# Patient Record
Sex: MALE | Race: BLACK OR AFRICAN AMERICAN | Employment: FULL TIME | URBAN - METROPOLITAN AREA
[De-identification: names, ages, dates, MRNs, and addresses within clinical notes are randomized per-mention and may not be internally consistent; named-entity substitution may affect disease eponyms.]

---

## 2024-03-08 ENCOUNTER — HOSPITAL ENCOUNTER (EMERGENCY)
Age: 19
Discharge: HOME | End: 2024-03-08
Attending: EMERGENCY MEDICINE

## 2024-03-08 VITALS
OXYGEN SATURATION: 100 % | HEART RATE: 76 BPM | DIASTOLIC BLOOD PRESSURE: 73 MMHG | SYSTOLIC BLOOD PRESSURE: 131 MMHG | TEMPERATURE: 98.1 F | RESPIRATION RATE: 18 BRPM

## 2024-03-08 DIAGNOSIS — R19.7 NAUSEA VOMITING AND DIARRHEA: Primary | ICD-10-CM

## 2024-03-08 DIAGNOSIS — R11.2 NAUSEA VOMITING AND DIARRHEA: Primary | ICD-10-CM

## 2024-03-08 LAB
ALBUMIN SERPL-MCNC: 5.2 G/DL (ref 3.4–5)
ALBUMIN/GLOB SERPL: 1.5 {RATIO} (ref 1.1–2.2)
ALP SERPL-CCNC: 87 U/L (ref 40–129)
ALT SERPL-CCNC: 20 U/L (ref 10–40)
ANION GAP SERPL CALCULATED.3IONS-SCNC: 16 MMOL/L (ref 3–16)
AST SERPL-CCNC: 24 U/L (ref 15–37)
BASOPHILS # BLD: 0.1 K/UL (ref 0–0.2)
BASOPHILS NFR BLD: 0.5 %
BILIRUB SERPL-MCNC: 0.5 MG/DL (ref 0–1)
BUN SERPL-MCNC: 14 MG/DL (ref 7–20)
CALCIUM SERPL-MCNC: 10.6 MG/DL (ref 8.3–10.6)
CHLORIDE SERPL-SCNC: 103 MMOL/L (ref 99–110)
CO2 SERPL-SCNC: 21 MMOL/L (ref 21–32)
CREAT SERPL-MCNC: 1 MG/DL (ref 0.9–1.3)
DEPRECATED RDW RBC AUTO: 12.9 % (ref 12.4–15.4)
EOSINOPHIL # BLD: 0.2 K/UL (ref 0–0.6)
EOSINOPHIL NFR BLD: 1.1 %
GFR SERPLBLD CREATININE-BSD FMLA CKD-EPI: >60 ML/MIN/{1.73_M2}
GLUCOSE SERPL-MCNC: 171 MG/DL (ref 70–99)
HCT VFR BLD AUTO: 49.3 % (ref 40.5–52.5)
HGB BLD-MCNC: 17.2 G/DL (ref 13.5–17.5)
LIPASE SERPL-CCNC: 22 U/L (ref 13–60)
LYMPHOCYTES # BLD: 1.1 K/UL (ref 1–5.1)
LYMPHOCYTES NFR BLD: 7.3 %
MCH RBC QN AUTO: 29.2 PG (ref 26–34)
MCHC RBC AUTO-ENTMCNC: 34.8 G/DL (ref 31–36)
MCV RBC AUTO: 83.8 FL (ref 80–100)
MONOCYTES # BLD: 0.9 K/UL (ref 0–1.3)
MONOCYTES NFR BLD: 6.3 %
NEUTROPHILS # BLD: 12.5 K/UL (ref 1.7–7.7)
NEUTROPHILS NFR BLD: 84.8 %
PLATELET # BLD AUTO: 225 K/UL (ref 135–450)
PMV BLD AUTO: 9.6 FL (ref 5–10.5)
POTASSIUM SERPL-SCNC: 4.6 MMOL/L (ref 3.5–5.1)
PROT SERPL-MCNC: 8.6 G/DL (ref 6.4–8.2)
RBC # BLD AUTO: 5.87 M/UL (ref 4.2–5.9)
SODIUM SERPL-SCNC: 140 MMOL/L (ref 136–145)
WBC # BLD AUTO: 14.8 K/UL (ref 4–11)

## 2024-03-08 PROCEDURE — 96375 TX/PRO/DX INJ NEW DRUG ADDON: CPT

## 2024-03-08 PROCEDURE — 96374 THER/PROPH/DIAG INJ IV PUSH: CPT

## 2024-03-08 PROCEDURE — 2580000003 HC RX 258: Performed by: EMERGENCY MEDICINE

## 2024-03-08 PROCEDURE — 80053 COMPREHEN METABOLIC PANEL: CPT

## 2024-03-08 PROCEDURE — 85025 COMPLETE CBC W/AUTO DIFF WBC: CPT

## 2024-03-08 PROCEDURE — 83690 ASSAY OF LIPASE: CPT

## 2024-03-08 PROCEDURE — 6360000002 HC RX W HCPCS: Performed by: EMERGENCY MEDICINE

## 2024-03-08 PROCEDURE — 99284 EMERGENCY DEPT VISIT MOD MDM: CPT

## 2024-03-08 RX ORDER — KETOROLAC TROMETHAMINE 15 MG/ML
15 INJECTION, SOLUTION INTRAMUSCULAR; INTRAVENOUS ONCE
Status: COMPLETED | OUTPATIENT
Start: 2024-03-08 | End: 2024-03-08

## 2024-03-08 RX ORDER — ONDANSETRON 4 MG/1
4 TABLET, FILM COATED ORAL 3 TIMES DAILY PRN
Qty: 15 TABLET | Refills: 0 | Status: SHIPPED | OUTPATIENT
Start: 2024-03-08

## 2024-03-08 RX ORDER — ONDANSETRON 4 MG/1
4 TABLET, FILM COATED ORAL 3 TIMES DAILY PRN
Qty: 15 TABLET | Refills: 0 | Status: SHIPPED | OUTPATIENT
Start: 2024-03-08 | End: 2024-03-08

## 2024-03-08 RX ORDER — 0.9 % SODIUM CHLORIDE 0.9 %
1000 INTRAVENOUS SOLUTION INTRAVENOUS ONCE
Status: COMPLETED | OUTPATIENT
Start: 2024-03-08 | End: 2024-03-08

## 2024-03-08 RX ORDER — DROPERIDOL 2.5 MG/ML
1.25 INJECTION, SOLUTION INTRAMUSCULAR; INTRAVENOUS EVERY 6 HOURS PRN
Status: DISCONTINUED | OUTPATIENT
Start: 2024-03-08 | End: 2024-03-08 | Stop reason: HOSPADM

## 2024-03-08 RX ORDER — ONDANSETRON 2 MG/ML
4 INJECTION INTRAMUSCULAR; INTRAVENOUS ONCE
Status: COMPLETED | OUTPATIENT
Start: 2024-03-08 | End: 2024-03-08

## 2024-03-08 RX ADMIN — ONDANSETRON 4 MG: 2 INJECTION INTRAMUSCULAR; INTRAVENOUS at 04:34

## 2024-03-08 RX ADMIN — SODIUM CHLORIDE 1000 ML: 9 INJECTION, SOLUTION INTRAVENOUS at 05:04

## 2024-03-08 RX ADMIN — DROPERIDOL 1.25 MG: 2.5 INJECTION, SOLUTION INTRAMUSCULAR; INTRAVENOUS at 06:17

## 2024-03-08 RX ADMIN — KETOROLAC TROMETHAMINE 15 MG: 15 INJECTION, SOLUTION INTRAMUSCULAR; INTRAVENOUS at 04:34

## 2024-03-08 ASSESSMENT — LIFESTYLE VARIABLES
HOW OFTEN DO YOU HAVE A DRINK CONTAINING ALCOHOL: NEVER
HOW MANY STANDARD DRINKS CONTAINING ALCOHOL DO YOU HAVE ON A TYPICAL DAY: PATIENT DOES NOT DRINK

## 2024-03-08 ASSESSMENT — PAIN - FUNCTIONAL ASSESSMENT: PAIN_FUNCTIONAL_ASSESSMENT: NONE - DENIES PAIN

## 2024-03-08 NOTE — ED PROVIDER NOTES
[facility] EMERGENCY DEPARTMENT  EMERGENCY DEPARTMENT ENCOUNTER      Pt Name: Raffi Lux  MRN: [id number]  Birthdate 2005  Date of evaluation: 3/8/2024  Provider: Josseline Thompson MD    CHIEF COMPLAINT       Chief Complaint   Patient presents with    Emesis     Pt from home via [facility] c/o emesis since 020, pt states he had some chinese food around 2000 last night, denies drug or alcohol use.         HISTORY OF PRESENT ILLNESS   (Location/Symptom, Timing/Onset, Context/Setting, Quality, Duration, Modifying Factors, Severity)  Note limiting factors.   Raffi Lux is a 18 y.o. male who presents to the emergency department with nausea and vomiting since about midnight.  No alcohol or drug use.  He does not use cannabis.  No known sick contacts.  No fevers no diarrhea but he does feel like he needs to have a bowel movement in the emergency department.  No black or bloody emesis.  No black or bloody stools.  No prior episodes.  Mild diffuse abdominal discomfort like cramping.  No exacerbating or relieving factors.        Nursing Notes were reviewed.    REVIEW OF SYSTEMS    (2-9 systems for level 4, 10 or more for level 5)     As per HPI    Except as noted above the remainder of the review of systems was reviewed and negative.       PAST MEDICAL HISTORY   History reviewed. No pertinent past medical history.      SURGICAL HISTORY     History reviewed. No pertinent surgical history.      CURRENT MEDICATIONS       Previous Medications    No medications on file       ALLERGIES     Patient has no known allergies.    FAMILY HISTORY     History reviewed. No pertinent family history.       SOCIAL HISTORY       Social History     Socioeconomic History    Marital status: Single     Spouse name: None    Number of children: None    Years of education: None    Highest education level: None   Tobacco Use    Smoking status: Never    Smokeless tobacco: Never   Vaping Use    Vaping Use: Never used   Substance and  Sexual Activity    Alcohol use: Never    Drug use: Never       SCREENINGS         [place] Coma Scale  Eye Opening: Spontaneous  Best Verbal Response: Oriented  Best Motor Response: Obeys commands  Selin Coma Scale Score: 15                                       PHYSICAL EXAM    (up to 7 for level 4, 8 or more for level 5)     ED Triage Vitals   BP Temp Temp src Pulse Resp SpO2 Height Weight   -- -- -- -- -- -- -- --       GEN: Well nourished, well developed, no acute distress.  Actively vomiting into emesis  HEAD: Normocephalic, atraumatic.  No step-offs or deformities  EYES: Pupils equally round and reactive to light.  Extraocular movements intact.  Anicteric sclera  ENT: No nasal discharge.  No visible trauma.  Pink moist mucous membranes..  No lip, throat, or tongue swelling  NECK: Supple.  Painless range of motion..  Trachea midline.  CHEST: No visible deformity  HEART: Regular rate and rhythm.  No murmurs, gallops, rubs  LUNGS: Clear to auscultation bilaterally.  No wheezes, rhonchi, or rubs  ABDOMEN: Soft, nontender, nondistended.  Negative Berumen sign.  No tenderness over McBurney's point.  No rebound or guarding.  No definite masses noted.  EXTREMITIES: No clubbing, cyanosis, or edema.  No obvious deformities noted.  Calves appear equal and are nontender  SKIN: Warm, Dry, well-perfused.  No rash noted  NEURO: Alert and oriented x3.  No obvious focal neurologic deficits  PSYCH: Appropriate, cooperative      DIAGNOSTIC RESULTS           ED BEDSIDE ULTRASOUND:   Performed by ED Physician - none    LABS:  Labs Reviewed   CBC WITH AUTO DIFFERENTIAL - Abnormal; Notable for the following components:       Result Value    WBC 14.8 (*)     Neutrophils Absolute 12.5 (*)     All other components within normal limits   COMPREHENSIVE METABOLIC PANEL W/ REFLEX TO MG FOR LOW K - Abnormal; Notable for the following components:    Glucose 171 (*)     Total Protein 8.6 (*)     Albumin 5.2 (*)     All other components within

## 2024-07-16 ENCOUNTER — HOSPITAL ENCOUNTER (EMERGENCY)
Facility: HOSPITAL | Age: 19
Discharge: HOME | End: 2024-07-16
Attending: STUDENT IN AN ORGANIZED HEALTH CARE EDUCATION/TRAINING PROGRAM
Payer: OTHER GOVERNMENT

## 2024-07-16 ENCOUNTER — APPOINTMENT (OUTPATIENT)
Dept: RADIOLOGY | Facility: HOSPITAL | Age: 19
End: 2024-07-16
Payer: OTHER GOVERNMENT

## 2024-07-16 ENCOUNTER — APPOINTMENT (OUTPATIENT)
Dept: CARDIOLOGY | Facility: HOSPITAL | Age: 19
End: 2024-07-16
Payer: OTHER GOVERNMENT

## 2024-07-16 VITALS
OXYGEN SATURATION: 99 % | WEIGHT: 140 LBS | RESPIRATION RATE: 16 BRPM | HEART RATE: 53 BPM | DIASTOLIC BLOOD PRESSURE: 65 MMHG | BODY MASS INDEX: 21.22 KG/M2 | SYSTOLIC BLOOD PRESSURE: 108 MMHG | TEMPERATURE: 98.6 F | HEIGHT: 68 IN

## 2024-07-16 DIAGNOSIS — R07.9 CHEST PAIN, UNSPECIFIED TYPE: Primary | ICD-10-CM

## 2024-07-16 LAB
ALBUMIN SERPL BCP-MCNC: 4.3 G/DL (ref 3.4–5)
ALP SERPL-CCNC: 61 U/L (ref 33–120)
ALT SERPL W P-5'-P-CCNC: 13 U/L (ref 10–52)
ANION GAP SERPL CALC-SCNC: 10 MMOL/L (ref 10–20)
APTT PPP: 33 SECONDS (ref 27–38)
AST SERPL W P-5'-P-CCNC: 17 U/L (ref 9–39)
BASOPHILS # BLD AUTO: 0.08 X10*3/UL (ref 0–0.1)
BASOPHILS NFR BLD AUTO: 1.3 %
BILIRUB SERPL-MCNC: 0.5 MG/DL (ref 0–1.2)
BUN SERPL-MCNC: 15 MG/DL (ref 6–23)
CALCIUM SERPL-MCNC: 9.1 MG/DL (ref 8.6–10.3)
CARDIAC TROPONIN I PNL SERPL HS: 3 NG/L (ref 0–20)
CHLORIDE SERPL-SCNC: 106 MMOL/L (ref 98–107)
CO2 SERPL-SCNC: 28 MMOL/L (ref 21–32)
CREAT SERPL-MCNC: 1.31 MG/DL (ref 0.5–1.3)
D DIMER PPP FEU-MCNC: <215 NG/ML FEU
EGFRCR SERPLBLD CKD-EPI 2021: 80 ML/MIN/1.73M*2
EOSINOPHIL # BLD AUTO: 0.44 X10*3/UL (ref 0–0.7)
EOSINOPHIL NFR BLD AUTO: 7.3 %
ERYTHROCYTE [DISTWIDTH] IN BLOOD BY AUTOMATED COUNT: 12.1 % (ref 11.5–14.5)
GLUCOSE SERPL-MCNC: 85 MG/DL (ref 74–99)
HCT VFR BLD AUTO: 40.7 % (ref 41–52)
HGB BLD-MCNC: 14.5 G/DL (ref 13.5–17.5)
IMM GRANULOCYTES # BLD AUTO: 0.02 X10*3/UL (ref 0–0.7)
IMM GRANULOCYTES NFR BLD AUTO: 0.3 % (ref 0–0.9)
INR PPP: 1.1 (ref 0.9–1.1)
LYMPHOCYTES # BLD AUTO: 2.19 X10*3/UL (ref 1.2–4.8)
LYMPHOCYTES NFR BLD AUTO: 36.5 %
MCH RBC QN AUTO: 29.3 PG (ref 26–34)
MCHC RBC AUTO-ENTMCNC: 35.6 G/DL (ref 32–36)
MCV RBC AUTO: 82 FL (ref 80–100)
MONOCYTES # BLD AUTO: 0.64 X10*3/UL (ref 0.1–1)
MONOCYTES NFR BLD AUTO: 10.7 %
NEUTROPHILS # BLD AUTO: 2.63 X10*3/UL (ref 1.2–7.7)
NEUTROPHILS NFR BLD AUTO: 43.9 %
NRBC BLD-RTO: 0 /100 WBCS (ref 0–0)
PLATELET # BLD AUTO: 183 X10*3/UL (ref 150–450)
POTASSIUM SERPL-SCNC: 3.9 MMOL/L (ref 3.5–5.3)
PROT SERPL-MCNC: 6.8 G/DL (ref 6.4–8.2)
PROTHROMBIN TIME: 12 SECONDS (ref 9.8–12.8)
RBC # BLD AUTO: 4.95 X10*6/UL (ref 4.5–5.9)
SODIUM SERPL-SCNC: 140 MMOL/L (ref 136–145)
WBC # BLD AUTO: 6 X10*3/UL (ref 4.4–11.3)

## 2024-07-16 PROCEDURE — 84484 ASSAY OF TROPONIN QUANT: CPT | Performed by: PHYSICIAN ASSISTANT

## 2024-07-16 PROCEDURE — 84075 ASSAY ALKALINE PHOSPHATASE: CPT | Performed by: PHYSICIAN ASSISTANT

## 2024-07-16 PROCEDURE — 85610 PROTHROMBIN TIME: CPT | Performed by: PHYSICIAN ASSISTANT

## 2024-07-16 PROCEDURE — 85730 THROMBOPLASTIN TIME PARTIAL: CPT | Performed by: PHYSICIAN ASSISTANT

## 2024-07-16 PROCEDURE — 71046 X-RAY EXAM CHEST 2 VIEWS: CPT | Mod: FOREIGN READ | Performed by: RADIOLOGY

## 2024-07-16 PROCEDURE — 71046 X-RAY EXAM CHEST 2 VIEWS: CPT

## 2024-07-16 PROCEDURE — 36415 COLL VENOUS BLD VENIPUNCTURE: CPT | Performed by: PHYSICIAN ASSISTANT

## 2024-07-16 PROCEDURE — 85379 FIBRIN DEGRADATION QUANT: CPT | Performed by: PHYSICIAN ASSISTANT

## 2024-07-16 PROCEDURE — 85025 COMPLETE CBC W/AUTO DIFF WBC: CPT | Performed by: PHYSICIAN ASSISTANT

## 2024-07-16 PROCEDURE — 93005 ELECTROCARDIOGRAM TRACING: CPT

## 2024-07-16 PROCEDURE — 99284 EMERGENCY DEPT VISIT MOD MDM: CPT | Mod: 25

## 2024-07-16 ASSESSMENT — LIFESTYLE VARIABLES
EVER FELT BAD OR GUILTY ABOUT YOUR DRINKING: NO
HAVE YOU EVER FELT YOU SHOULD CUT DOWN ON YOUR DRINKING: NO
EVER HAD A DRINK FIRST THING IN THE MORNING TO STEADY YOUR NERVES TO GET RID OF A HANGOVER: NO
HAVE PEOPLE ANNOYED YOU BY CRITICIZING YOUR DRINKING: NO
TOTAL SCORE: 0

## 2024-07-16 ASSESSMENT — PAIN SCALES - GENERAL: PAINLEVEL_OUTOF10: 8

## 2024-07-16 ASSESSMENT — COLUMBIA-SUICIDE SEVERITY RATING SCALE - C-SSRS
2. HAVE YOU ACTUALLY HAD ANY THOUGHTS OF KILLING YOURSELF?: NO
6. HAVE YOU EVER DONE ANYTHING, STARTED TO DO ANYTHING, OR PREPARED TO DO ANYTHING TO END YOUR LIFE?: NO
1. IN THE PAST MONTH, HAVE YOU WISHED YOU WERE DEAD OR WISHED YOU COULD GO TO SLEEP AND NOT WAKE UP?: NO

## 2024-07-16 ASSESSMENT — PAIN - FUNCTIONAL ASSESSMENT: PAIN_FUNCTIONAL_ASSESSMENT: 0-10

## 2024-07-17 LAB
ATRIAL RATE: 57 BPM
P AXIS: 69 DEGREES
PR INTERVAL: 152 MS
Q ONSET: 249 MS
QRS COUNT: 9 BEATS
QRS DURATION: 86 MS
QT INTERVAL: 399 MS
QTC CALCULATION(BAZETT): 386 MS
QTC FREDERICIA: 390 MS
R AXIS: 67 DEGREES
T AXIS: 38 DEGREES
T OFFSET: 449 MS
VENTRICULAR RATE: 56 BPM

## 2024-07-17 NOTE — ED PROVIDER NOTES
EMERGENCY MEDICINE EVALUATION NOTE    History of Present Illness     Chief Complaint:   Chief Complaint   Patient presents with    Chest Pain       HPI: Gerry Dhaliwal is a 19 y.o. male presents with a chief complaint of chest pain.  Patient reports he is having right upper chest pain has been going on now for about 2 weeks.  He states that he is currently in the [employer] and they were doing a 3-1/2 mile run yesterday when he started experience worsening chest pain.  He states that is not only worse when he runs he states that even when he is just sitting there and he feels like there is an achiness in his chest.  Patient denies any cardiac history denies any fevers or chills.  Patient denies any cough or shortness of breath associated with it.  Patient did not take any at home for symptoms.  Patient denies any medication allergies.    Previous History   No past medical history on file.  No past surgical history on file.     No family history on file.  No Known Allergies  No current outpatient medications    Physical Exam     Appearance: Alert, oriented , cooperative,  in no acute distress.      Skin: Intact,  dry skin, no lesions, rash, petechiae or purpura.      Eyes: PERRLA, EOMs intact,  Conjunctiva pink      ENT: Hearing grossly intact. Pharynx clear     Neck: Supple. Trachea at midline.     Pulmonary: Clear bilaterally. No rales, rhonchi or wheezing. No accessory muscle use or stridor.     Cardiac: Normal rate and rhythm without murmur     Abdomen: Soft, nontender, active bowel sounds.     Musculoskeletal: Full range of motion.      Neurological:Cranial nerves II through XII are grossly intact, normal sensation, no weakness, no focal findings identified.     Results     Labs Reviewed   CBC WITH AUTO DIFFERENTIAL - Abnormal       Result Value    WBC 6.0      nRBC 0.0      RBC 4.95      Hemoglobin 14.5      Hematocrit 40.7 (*)     MCV 82      MCH 29.3      MCHC 35.6      RDW 12.1      Platelets 183      Neutrophils %  43.9      Immature Granulocytes %, Automated 0.3      Lymphocytes % 36.5      Monocytes % 10.7      Eosinophils % 7.3      Basophils % 1.3      Neutrophils Absolute 2.63      Immature Granulocytes Absolute, Automated 0.02      Lymphocytes Absolute 2.19      Monocytes Absolute 0.64      Eosinophils Absolute 0.44      Basophils Absolute 0.08     COMPREHENSIVE METABOLIC PANEL - Abnormal    Glucose 85      Sodium 140      Potassium 3.9      Chloride 106      Bicarbonate 28      Anion Gap 10      Urea Nitrogen 15      Creatinine 1.31 (*)     eGFR 80      Calcium 9.1      Albumin 4.3      Alkaline Phosphatase 61      Total Protein 6.8      AST 17      Bilirubin, Total 0.5      ALT 13     TROPONIN I, HIGH SENSITIVITY - Normal    Troponin I, High Sensitivity 3      Narrative:     Less than 99th percentile of normal range cutoff-  Female and children under 18 years old <14 ng/L; Male <21 ng/L: Negative  Repeat testing should be performed if clinically indicated.     Female and children under 18 years old 14-50 ng/L; Male 21-50 ng/L:  Consistent with possible cardiac damage and possible increased clinical   risk. Serial measurements may help to assess extent of myocardial damage.     >50 ng/L: Consistent with cardiac damage, increased clinical risk and  myocardial infarction. Serial measurements may help assess extent of   myocardial damage.      NOTE: Children less than 1 year old may have higher baseline troponin   levels and results should be interpreted in conjunction with the overall   clinical context.     NOTE: Troponin I testing is performed using a different   testing methodology at [facility] than at other   [facility] hospitals. Direct result comparisons should only   be made within the same method.   APTT - Normal    aPTT 33      Narrative:     The APTT is no longer used for monitoring Unfractionated Heparin Therapy. For monitoring Heparin Therapy, use the Heparin Assay.   PROTIME-INR - Normal    Protime  "12.0      INR 1.1     D-DIMER, VTE EXCLUSION - Normal    D-Dimer, Quantitative VTE Exclusion <215      Narrative:     The VTE Exclusion D-Dimer assay is reported in ng/mL Fibrinogen Equivalent Units (FEU).    Per 's instructions for use, a value of less than 500 ng/mL (FEU) may help to exclude DVT or PE in outpatients when the assay is used with a clinical pretest probability assessment.([facility] must utilize and document eCalc 'Wells Score Deep Vein Thrombosis Risk' for DVT exclusion only. Emergency Department should utilize  Guidelines for Emergency Department Use of the VTE Exclusion D-Dimer and Clinical Pretest probability assessment model for DVT or PE exclusion.)     XR chest 2 views   Final Result   No acute cardiopulmonary disease.   Signed by Gabriel Stroud MD            ED Course & Medical Decision Making   Medications - No data to display  Heart Rate:  [53-64]   Temperature:  [37 °C (98.6 °F)]   Respirations:  [16]   BP: (108-122)/(65-68)   Height:  [172.7 cm (5' 8\")]   Weight:  [63.5 kg (140 lb)]   Pulse Ox:  [99 %]    ED Course as of 07/16/24 2112 Tue Jul 16, 2024 2110 Patient was seen and evaluated by admitting physician.  Plan of care discussed with patient patient is in agreement plan of care of discharge at this time.  Patient's workup did not show any acute abnormalities.  Patient had negative D-dimer as well as negative troponin.  Due to his symptoms been going on for 2 weeks I do not think patient needs further workup at this time.  I think ACS is low on the list for the cause of the patient's pain and especially due to his age.  Patient was encouraged to follow-up with PCP return here immediately with any worsening symptoms. [CJ]      ED Course User Index  [CJ] Terry Orellana PA-C         Diagnoses as of 07/16/24 2112   Chest pain, unspecified type       Procedures   ECG 12 lead    Performed by: Terry Orellana PA-C  Authorized by: Terry Orellana PA-C    ECG interpreted by ED Physician " in the absence of a cardiologist: yes    Rate:     ECG rate:  56  Rhythm:     Rhythm: sinus rhythm    ST segments:     ST segments:  Normal  T waves:     T waves: normal    Comments:      No STEMI      Diagnosis     1. Chest pain, unspecified type        Disposition   Discharge    ED Prescriptions    None         Disclaimer: This note was dictated by speech recognition. Minor errors in transcription may be present. Please call if questions.       Terry rOellana PA-C  07/16/24 2840

## 2024-07-19 ENCOUNTER — HOSPITAL ENCOUNTER (EMERGENCY)
Facility: HOSPITAL | Age: 19
Discharge: OTHER INSTITUTION | End: 2024-07-20
Attending: EMERGENCY MEDICINE
Payer: OTHER GOVERNMENT

## 2024-07-19 ENCOUNTER — APPOINTMENT (OUTPATIENT)
Dept: CARDIOLOGY | Facility: HOSPITAL | Age: 19
End: 2024-07-19
Payer: OTHER GOVERNMENT

## 2024-07-19 DIAGNOSIS — R45.851 SUICIDAL IDEATION: Primary | ICD-10-CM

## 2024-07-19 LAB
ALBUMIN SERPL BCP-MCNC: 4.5 G/DL (ref 3.4–5)
ALP SERPL-CCNC: 63 U/L (ref 33–120)
ALT SERPL W P-5'-P-CCNC: 11 U/L (ref 10–52)
AMPHETAMINES UR QL SCN: NORMAL
ANION GAP SERPL CALC-SCNC: 10 MMOL/L (ref 10–20)
APAP SERPL-MCNC: <10 UG/ML
AST SERPL W P-5'-P-CCNC: 14 U/L (ref 9–39)
BARBITURATES UR QL SCN: NORMAL
BASOPHILS # BLD AUTO: 0.09 X10*3/UL (ref 0–0.1)
BASOPHILS NFR BLD AUTO: 1.8 %
BENZODIAZ UR QL SCN: NORMAL
BILIRUB SERPL-MCNC: 0.5 MG/DL (ref 0–1.2)
BUN SERPL-MCNC: 9 MG/DL (ref 6–23)
BZE UR QL SCN: NORMAL
CALCIUM SERPL-MCNC: 9.2 MG/DL (ref 8.6–10.3)
CANNABINOIDS UR QL SCN: NORMAL
CHLORIDE SERPL-SCNC: 105 MMOL/L (ref 98–107)
CO2 SERPL-SCNC: 27 MMOL/L (ref 21–32)
CREAT SERPL-MCNC: 0.89 MG/DL (ref 0.5–1.3)
EGFRCR SERPLBLD CKD-EPI 2021: >90 ML/MIN/1.73M*2
EOSINOPHIL # BLD AUTO: 0.35 X10*3/UL (ref 0–0.7)
EOSINOPHIL NFR BLD AUTO: 6.9 %
ERYTHROCYTE [DISTWIDTH] IN BLOOD BY AUTOMATED COUNT: 12.2 % (ref 11.5–14.5)
ETHANOL SERPL-MCNC: <10 MG/DL
FENTANYL+NORFENTANYL UR QL SCN: NORMAL
GLUCOSE SERPL-MCNC: 88 MG/DL (ref 74–99)
HCT VFR BLD AUTO: 42.2 % (ref 41–52)
HGB BLD-MCNC: 14.7 G/DL (ref 13.5–17.5)
IMM GRANULOCYTES # BLD AUTO: 0 X10*3/UL (ref 0–0.7)
IMM GRANULOCYTES NFR BLD AUTO: 0 % (ref 0–0.9)
LYMPHOCYTES # BLD AUTO: 1.62 X10*3/UL (ref 1.2–4.8)
LYMPHOCYTES NFR BLD AUTO: 32 %
MCH RBC QN AUTO: 29.2 PG (ref 26–34)
MCHC RBC AUTO-ENTMCNC: 34.8 G/DL (ref 32–36)
MCV RBC AUTO: 84 FL (ref 80–100)
METHADONE UR QL SCN: NORMAL
MONOCYTES # BLD AUTO: 0.57 X10*3/UL (ref 0.1–1)
MONOCYTES NFR BLD AUTO: 11.2 %
NEUTROPHILS # BLD AUTO: 2.44 X10*3/UL (ref 1.2–7.7)
NEUTROPHILS NFR BLD AUTO: 48.1 %
NRBC BLD-RTO: 0 /100 WBCS (ref 0–0)
OPIATES UR QL SCN: NORMAL
OXYCODONE+OXYMORPHONE UR QL SCN: NORMAL
PCP UR QL SCN: NORMAL
PLATELET # BLD AUTO: 173 X10*3/UL (ref 150–450)
POTASSIUM SERPL-SCNC: 4 MMOL/L (ref 3.5–5.3)
PROT SERPL-MCNC: 7.1 G/DL (ref 6.4–8.2)
RBC # BLD AUTO: 5.04 X10*6/UL (ref 4.5–5.9)
SALICYLATES SERPL-MCNC: <3 MG/DL
SODIUM SERPL-SCNC: 138 MMOL/L (ref 136–145)
WBC # BLD AUTO: 5.1 X10*3/UL (ref 4.4–11.3)

## 2024-07-19 PROCEDURE — 80143 DRUG ASSAY ACETAMINOPHEN: CPT | Performed by: EMERGENCY MEDICINE

## 2024-07-19 PROCEDURE — 36415 COLL VENOUS BLD VENIPUNCTURE: CPT | Performed by: EMERGENCY MEDICINE

## 2024-07-19 PROCEDURE — 84075 ASSAY ALKALINE PHOSPHATASE: CPT | Performed by: EMERGENCY MEDICINE

## 2024-07-19 PROCEDURE — 85025 COMPLETE CBC W/AUTO DIFF WBC: CPT | Performed by: EMERGENCY MEDICINE

## 2024-07-19 PROCEDURE — 80307 DRUG TEST PRSMV CHEM ANLYZR: CPT | Performed by: EMERGENCY MEDICINE

## 2024-07-19 PROCEDURE — 99285 EMERGENCY DEPT VISIT HI MDM: CPT | Mod: 25

## 2024-07-19 PROCEDURE — 93005 ELECTROCARDIOGRAM TRACING: CPT

## 2024-07-19 SDOH — HEALTH STABILITY: MENTAL HEALTH
DEPRESSION SYMPTOMS: APPETITE CHANGE;CHANGE IN ENERGY LEVEL;FEELINGS OF HELPLESSNESS;FEELINGS OF HOPELESSESS;FEELINGS OF WORTHLESSNESS;IMPAIRED CONCENTRATION;ISOLATIVE;LOSS OF INTEREST;SLEEP DISTURBANCE

## 2024-07-19 SDOH — HEALTH STABILITY: MENTAL HEALTH: IN THE PAST FEW WEEKS, HAVE YOU WISHED YOU WERE DEAD?: YES

## 2024-07-19 SDOH — ECONOMIC STABILITY: HOUSING INSECURITY: FEELS SAFE LIVING IN HOME: YES

## 2024-07-19 SDOH — HEALTH STABILITY: MENTAL HEALTH: ACTIVE SUICIDAL IDEATION WITH SPECIFIC PLAN AND INTENT (PAST 1 MONTH): YES

## 2024-07-19 SDOH — HEALTH STABILITY: MENTAL HEALTH: IN THE PAST WEEK, HAVE YOU BEEN HAVING THOUGHTS ABOUT KILLING YOURSELF?: YES

## 2024-07-19 SDOH — HEALTH STABILITY: MENTAL HEALTH: WISH TO BE DEAD (PAST 1 MONTH): YES

## 2024-07-19 SDOH — HEALTH STABILITY: MENTAL HEALTH: ACTIVE SUICIDAL IDEATION WITH SOME INTENT TO ACT, WITHOUT SPECIFIC PLAN (PAST 1 MONTH): YES

## 2024-07-19 SDOH — HEALTH STABILITY: MENTAL HEALTH: ANXIETY SYMPTOMS: NO PROBLEMS REPORTED OR OBSERVED.

## 2024-07-19 SDOH — HEALTH STABILITY: MENTAL HEALTH: ARE YOU HAVING THOUGHTS OF KILLING YOURSELF RIGHT NOW?: NO

## 2024-07-19 SDOH — HEALTH STABILITY: MENTAL HEALTH: IN THE PAST FEW WEEKS, HAVE YOU FELT THAT YOU OR YOUR FAMILY WOULD BE BETTER OFF IF YOU WERE DEAD?: YES

## 2024-07-19 SDOH — HEALTH STABILITY: MENTAL HEALTH: HAVE YOU EVER TRIED TO KILL YOURSELF?: NO

## 2024-07-19 SDOH — HEALTH STABILITY: MENTAL HEALTH: NON-SPECIFIC ACTIVE SUICIDAL THOUGHTS (PAST 1 MONTH): YES

## 2024-07-19 SDOH — HEALTH STABILITY: MENTAL HEALTH: SUICIDAL BEHAVIOR (LIFETIME): NO

## 2024-07-19 SDOH — HEALTH STABILITY: MENTAL HEALTH: SUICIDAL BEHAVIOR (3 MONTHS): NO

## 2024-07-19 ASSESSMENT — LIFESTYLE VARIABLES
HAVE PEOPLE ANNOYED YOU BY CRITICIZING YOUR DRINKING: NO
EVER HAD A DRINK FIRST THING IN THE MORNING TO STEADY YOUR NERVES TO GET RID OF A HANGOVER: NO
EVER FELT BAD OR GUILTY ABOUT YOUR DRINKING: NO
SUBSTANCE_ABUSE_PAST_12_MONTHS: NO
PRESCIPTION_ABUSE_PAST_12_MONTHS: NO
HAVE YOU EVER FELT YOU SHOULD CUT DOWN ON YOUR DRINKING: NO
TOTAL SCORE: 0

## 2024-07-19 ASSESSMENT — PAIN SCALES - GENERAL: PAINLEVEL_OUTOF10: 0 - NO PAIN

## 2024-07-19 ASSESSMENT — PAIN - FUNCTIONAL ASSESSMENT: PAIN_FUNCTIONAL_ASSESSMENT: 0-10

## 2024-07-19 NOTE — ED TRIAGE NOTES
Pt here with friends with c/o suicidal ideations. Pt states he has a plan but did not express any details. Pt states he is not on any medication. In pts past he took meds for mental health. Pt is calm and cooperative. Pt makes eye contact.

## 2024-07-19 NOTE — ED PROVIDER NOTES
HPI   Chief Complaint   Patient presents with    Suicidal       Patient presents to the emergency department to be evaluated for suicidal ideation.  Patient presents with his commanding officer because of thoughts of self-harm.  Patient states that he called his ex his grandfather and was talking to him about his symptoms.  He states that he believes the ask his grandfather called somebody higher up in the  and it came back around for him to be seen in the hospital for evaluation.  Patient has no chest pain.  No shortness of breath.  No cough or congestion.  No extremity pain.  No other complaints at this time.  Patient was hospitalized once in high school secondary to mental health issues.  He is not currently on medication for mental health issues.  He does not currently see a psychiatrist or a counselor.                          Anuj Coma Scale Score: 15                  Patient History   No past medical history on file.  No past surgical history on file.  No family history on file.  Social History     Tobacco Use    Smoking status: Not on file    Smokeless tobacco: Not on file   Substance Use Topics    Alcohol use: Not on file    Drug use: Not on file       Physical Exam   ED Triage Vitals [07/19/24 1522]   Temperature Heart Rate Respirations BP   36.7 °C (98 °F) 52 16 129/67      Pulse Ox Temp Source Heart Rate Source Patient Position   98 % Temporal -- Sitting      BP Location FiO2 (%)     Left arm --       Physical Exam  Vitals and nursing note reviewed.   Constitutional:       Appearance: Normal appearance.   HENT:      Head: Normocephalic.      Nose: Nose normal.      Mouth/Throat:      Mouth: Mucous membranes are moist.   Eyes:      Pupils: Pupils are equal, round, and reactive to light.   Cardiovascular:      Rate and Rhythm: Normal rate and regular rhythm.      Pulses: Normal pulses.      Heart sounds: Normal heart sounds.   Pulmonary:      Effort: Pulmonary effort is normal.      Breath  sounds: Normal breath sounds.   Abdominal:      General: Abdomen is flat. Bowel sounds are normal.      Palpations: Abdomen is soft.      Tenderness: There is no abdominal tenderness. There is no guarding or rebound.   Musculoskeletal:         General: No deformity. Normal range of motion.      Cervical back: Normal range of motion.   Skin:     General: Skin is warm and dry.      Capillary Refill: Capillary refill takes less than 2 seconds.   Neurological:      General: No focal deficit present.      Mental Status: He is alert and oriented to person, place, and time.   Psychiatric:         Mood and Affect: Mood normal.         Behavior: Behavior normal.       Labs Reviewed   COMPREHENSIVE METABOLIC PANEL - Normal       Result Value    Glucose 88      Sodium 138      Potassium 4.0      Chloride 105      Bicarbonate 27      Anion Gap 10      Urea Nitrogen 9      Creatinine 0.89      eGFR >90      Calcium 9.2      Albumin 4.5      Alkaline Phosphatase 63      Total Protein 7.1      AST 14      Bilirubin, Total 0.5      ALT 11     DRUG SCREEN,URINE - Normal    Amphetamine Screen, Urine Presumptive Negative      Barbiturate Screen, Urine Presumptive Negative      Benzodiazepines Screen, Urine Presumptive Negative      Cannabinoid Screen, Urine Presumptive Negative      Cocaine Metabolite Screen, Urine Presumptive Negative      Fentanyl Screen, Urine Presumptive Negative      Opiate Screen, Urine Presumptive Negative      Oxycodone Screen, Urine Presumptive Negative      PCP Screen, Urine Presumptive Negative      Methadone Screen, Urine Presumptive Negative      Narrative:     Drug screen results are presumptive and should not be used to assess   compliance with prescribed medication. Contact the performing [facility] laboratory   to add-on definitive confirmatory testing if clinically indicated.    Toxicology screening results are reported qualitatively. The concentration must   be greater than or equal to the cutoff to be  reported as positive. The concentration   at which the screening test can detect an individual drug or metabolite varies.   The absence of expected drug(s) and/or drug metabolite(s) may indicate non-compliance,   inappropriate timing of specimen collection relative to drug administration, poor drug   absorption, diluted/adulterated urine, or limitations of testing. For medical purposes   only; not valid for forensic use.    Interpretive questions should be directed to the laboratory medical directors.   ACUTE TOXICOLOGY PANEL, BLOOD - Normal    Acetaminophen <10.0      Salicylate  <3      Alcohol <10     CBC WITH AUTO DIFFERENTIAL    WBC 5.1      nRBC 0.0      RBC 5.04      Hemoglobin 14.7      Hematocrit 42.2      MCV 84      MCH 29.2      MCHC 34.8      RDW 12.2      Platelets 173      Neutrophils % 48.1      Immature Granulocytes %, Automated 0.0      Lymphocytes % 32.0      Monocytes % 11.2      Eosinophils % 6.9      Basophils % 1.8      Neutrophils Absolute 2.44      Immature Granulocytes Absolute, Automated 0.00      Lymphocytes Absolute 1.62      Monocytes Absolute 0.57      Eosinophils Absolute 0.35      Basophils Absolute 0.09       Pain Management Panel          Latest Ref Rng & Units 7/19/2024   Pain Management Panel   Amphetamine Screen, Urine Presumptive Negative Presumptive Negative    Barbiturate Screen, Urine Presumptive Negative Presumptive Negative    Benzodiazepines Screen, Urine Presumptive Negative Presumptive Negative    Fentanyl Screen, Urine Presumptive Negative Presumptive Negative    Methadone Screen, Urine Presumptive Negative Presumptive Negative       Details                 No orders to display     ED Course & MDM   Diagnoses as of 07/19/24 2011   Suicidal ideation       Medical Decision Making  Patient presents emergency department secondary to suicidal ideation.  Patient is evaluated in the emergency department with physical exam and laboratory workup.  Physical exam is benign  without acute medical cause of suicidal ideation.  Laboratory workup was performed including CBC CMP and tox screen.  EKG was performed at 3:37 PM.  It is sinus rhythm rate of 54.  No acute ST elevation.  MN interval is 148 and QTc is 374.  Patient's commanding officer is here with the patient.  His name is SSG Stephen Marquez and phone number is [phone].  Patient does give verbal permission for his commanding officer to speak with EPAT once he is medically cleared.  Commanding officer is agreeable to this.  Patient's laboratory workup is unremarkable.  At this time patient is medically cleared for psychiatric evaluation.  EPAT is consulted.  Patient is evaluated and EPAT is recommending admission for further psychiatric treatment at this time.  Patient is pending admission at the time of signout.  Patient is signed out to the oncoming physician for disposition.        Procedure  Procedures     Nelida Greene MD  07/19/24 2011

## 2024-07-20 ENCOUNTER — HOSPITAL ENCOUNTER (INPATIENT)
Facility: HOSPITAL | Age: 19
DRG: 885 | End: 2024-07-20
Attending: PSYCHIATRY & NEUROLOGY | Admitting: PSYCHIATRY & NEUROLOGY
Payer: OTHER GOVERNMENT

## 2024-07-20 VITALS
SYSTOLIC BLOOD PRESSURE: 116 MMHG | WEIGHT: 140 LBS | BODY MASS INDEX: 21.22 KG/M2 | OXYGEN SATURATION: 98 % | TEMPERATURE: 98 F | RESPIRATION RATE: 16 BRPM | HEIGHT: 68 IN | DIASTOLIC BLOOD PRESSURE: 70 MMHG | HEART RATE: 56 BPM

## 2024-07-20 DIAGNOSIS — F33.2 MDD (MAJOR DEPRESSIVE DISORDER), RECURRENT SEVERE, WITHOUT PSYCHOSIS (MULTI): Primary | ICD-10-CM

## 2024-07-20 DIAGNOSIS — F32.89 OTHER SPECIFIED DEPRESSIVE DISORDER: ICD-10-CM

## 2024-07-20 PROBLEM — R45.851 DEPRESSION WITH SUICIDAL IDEATION: Status: ACTIVE | Noted: 2024-07-20

## 2024-07-20 PROBLEM — F32.A DEPRESSION WITH SUICIDAL IDEATION: Status: ACTIVE | Noted: 2024-07-20

## 2024-07-20 PROCEDURE — 99222 1ST HOSP IP/OBS MODERATE 55: CPT | Performed by: NURSE PRACTITIONER

## 2024-07-20 PROCEDURE — 1240000001 HC SEMI-PRIVATE BH ROOM DAILY

## 2024-07-20 RX ORDER — ACETAMINOPHEN 500 MG
5 TABLET ORAL NIGHTLY PRN
Status: DISCONTINUED | OUTPATIENT
Start: 2024-07-20 | End: 2024-07-29 | Stop reason: HOSPADM

## 2024-07-20 RX ORDER — HYDROXYZINE PAMOATE 50 MG/1
50 CAPSULE ORAL EVERY 4 HOURS PRN
Status: DISCONTINUED | OUTPATIENT
Start: 2024-07-20 | End: 2024-07-29 | Stop reason: HOSPADM

## 2024-07-20 RX ORDER — HALOPERIDOL 5 MG/ML
5 INJECTION INTRAMUSCULAR EVERY 6 HOURS PRN
Status: DISCONTINUED | OUTPATIENT
Start: 2024-07-20 | End: 2024-07-29 | Stop reason: HOSPADM

## 2024-07-20 RX ORDER — ALUMINUM HYDROXIDE, MAGNESIUM HYDROXIDE, AND SIMETHICONE 1200; 120; 1200 MG/30ML; MG/30ML; MG/30ML
30 SUSPENSION ORAL EVERY 6 HOURS PRN
Status: DISCONTINUED | OUTPATIENT
Start: 2024-07-20 | End: 2024-07-29 | Stop reason: HOSPADM

## 2024-07-20 RX ORDER — FLUTICASONE PROPIONATE 50 MCG
2 SPRAY, SUSPENSION (ML) NASAL DAILY
COMMUNITY
End: 2024-07-29 | Stop reason: HOSPADM

## 2024-07-20 RX ORDER — NICOTINE 7MG/24HR
1 PATCH, TRANSDERMAL 24 HOURS TRANSDERMAL DAILY
Status: DISCONTINUED | OUTPATIENT
Start: 2024-09-14 | End: 2024-07-29

## 2024-07-20 RX ORDER — DIPHENHYDRAMINE HYDROCHLORIDE 50 MG/ML
50 INJECTION INTRAMUSCULAR; INTRAVENOUS ONCE AS NEEDED
Status: DISCONTINUED | OUTPATIENT
Start: 2024-07-20 | End: 2024-07-29 | Stop reason: HOSPADM

## 2024-07-20 RX ORDER — IBUPROFEN 600 MG/1
600 TABLET ORAL EVERY 8 HOURS PRN
Status: DISCONTINUED | OUTPATIENT
Start: 2024-07-20 | End: 2024-07-29 | Stop reason: HOSPADM

## 2024-07-20 RX ORDER — DIPHENHYDRAMINE HCL 50 MG
50 CAPSULE ORAL EVERY 6 HOURS PRN
Status: DISCONTINUED | OUTPATIENT
Start: 2024-07-20 | End: 2024-07-29 | Stop reason: HOSPADM

## 2024-07-20 RX ORDER — LORAZEPAM 1 MG/1
2 TABLET ORAL EVERY 6 HOURS PRN
Status: DISCONTINUED | OUTPATIENT
Start: 2024-07-20 | End: 2024-07-29 | Stop reason: HOSPADM

## 2024-07-20 RX ORDER — LORAZEPAM 2 MG/ML
2 INJECTION INTRAMUSCULAR EVERY 6 HOURS PRN
Status: DISCONTINUED | OUTPATIENT
Start: 2024-07-20 | End: 2024-07-29 | Stop reason: HOSPADM

## 2024-07-20 RX ORDER — IBUPROFEN 200 MG
1 TABLET ORAL DAILY
Status: DISCONTINUED | OUTPATIENT
Start: 2024-08-31 | End: 2024-07-29

## 2024-07-20 RX ORDER — HALOPERIDOL 5 MG/1
5 TABLET ORAL EVERY 6 HOURS PRN
Status: DISCONTINUED | OUTPATIENT
Start: 2024-07-20 | End: 2024-07-29 | Stop reason: HOSPADM

## 2024-07-20 RX ORDER — IBUPROFEN 200 MG
1 TABLET ORAL DAILY
Status: DISCONTINUED | OUTPATIENT
Start: 2024-07-20 | End: 2024-07-29

## 2024-07-20 RX ORDER — MICONAZOLE NITRATE 2 %
2 CREAM (GRAM) TOPICAL EVERY 2 HOUR PRN
Status: DISCONTINUED | OUTPATIENT
Start: 2024-07-20 | End: 2024-07-29 | Stop reason: HOSPADM

## 2024-07-20 SDOH — HEALTH STABILITY: MENTAL HEALTH: HAVE YOU HAD THESE THOUGHTS AND HAD SOME INTENTION OF ACTING ON THEM?: NO

## 2024-07-20 SDOH — HEALTH STABILITY: MENTAL HEALTH: CONTENT: UNREMARKABLE

## 2024-07-20 SDOH — ECONOMIC STABILITY: HOUSING INSECURITY: IN THE PAST 12 MONTHS, HOW MANY TIMES HAVE YOU MOVED WHERE YOU WERE LIVING?: 1

## 2024-07-20 SDOH — HEALTH STABILITY: MENTAL HEALTH: HAVE YOU ACTUALLY HAD ANY THOUGHTS OF KILLING YOURSELF?: YES

## 2024-07-20 SDOH — ECONOMIC STABILITY: TRANSPORTATION INSECURITY
IN THE PAST 12 MONTHS, HAS LACK OF TRANSPORTATION KEPT YOU FROM MEETINGS, WORK, OR FROM GETTING THINGS NEEDED FOR DAILY LIVING?: NO

## 2024-07-20 SDOH — SOCIAL STABILITY: SOCIAL INSECURITY: ABUSE: ADULT

## 2024-07-20 SDOH — ECONOMIC STABILITY: FOOD INSECURITY: WITHIN THE PAST 12 MONTHS, YOU WORRIED THAT YOUR FOOD WOULD RUN OUT BEFORE YOU GOT MONEY TO BUY MORE.: NEVER TRUE

## 2024-07-20 SDOH — HEALTH STABILITY: MENTAL HEALTH

## 2024-07-20 SDOH — SOCIAL STABILITY: SOCIAL NETWORK
DO YOU BELONG TO ANY CLUBS OR ORGANIZATIONS SUCH AS CHURCH GROUPS UNIONS, FRATERNAL OR ATHLETIC GROUPS, OR SCHOOL GROUPS?: YES

## 2024-07-20 SDOH — SOCIAL STABILITY: SOCIAL INSECURITY: ARE YOU OR HAVE YOU BEEN THREATENED OR ABUSED PHYSICALLY, EMOTIONALLY, OR SEXUALLY BY ANYONE?: NO

## 2024-07-20 SDOH — HEALTH STABILITY: MENTAL HEALTH: HOW OFTEN DO YOU HAVE A DRINK CONTAINING ALCOHOL?: NEVER

## 2024-07-20 SDOH — HEALTH STABILITY: MENTAL HEALTH: SUICIDE ASSESSMENT: ADULT (C-SSRS)

## 2024-07-20 SDOH — HEALTH STABILITY: MENTAL HEALTH: HOW OFTEN DO YOU HAVE 6 OR MORE DRINKS ON ONE OCCASION?: NEVER

## 2024-07-20 SDOH — HEALTH STABILITY: MENTAL HEALTH: HAVE YOU WISHED YOU WERE DEAD OR WISHED YOU COULD GO TO SLEEP AND NOT WAKE UP?: YES

## 2024-07-20 SDOH — SOCIAL STABILITY: SOCIAL INSECURITY: HAVE YOU HAD THOUGHTS OF HARMING ANYONE ELSE?: NO

## 2024-07-20 SDOH — ECONOMIC STABILITY: INCOME INSECURITY: IN THE LAST 12 MONTHS, WAS THERE A TIME WHEN YOU WERE NOT ABLE TO PAY THE MORTGAGE OR RENT ON TIME?: NO

## 2024-07-20 SDOH — SOCIAL STABILITY: SOCIAL INSECURITY: WITHIN THE LAST YEAR, HAVE YOU BEEN AFRAID OF YOUR PARTNER OR EX-PARTNER?: NO

## 2024-07-20 SDOH — SOCIAL STABILITY: SOCIAL INSECURITY: DO YOU FEEL UNSAFE GOING BACK TO THE PLACE WHERE YOU ARE LIVING?: NO

## 2024-07-20 SDOH — HEALTH STABILITY: MENTAL HEALTH
HAVE YOU STARTED TO WORK OUT OR WORKED OUT THE DETAILS OF HOW TO KILL YOURSELF? DO YOU INTENT TO CARRY OUT THIS PLAN?: NO

## 2024-07-20 SDOH — HEALTH STABILITY: MENTAL HEALTH: SLEEP PATTERN: SLEEPS ALL NIGHT

## 2024-07-20 SDOH — SOCIAL STABILITY: SOCIAL INSECURITY: DO YOU FEEL ANYONE HAS EXPLOITED OR TAKEN ADVANTAGE OF YOU FINANCIALLY OR OF YOUR PERSONAL PROPERTY?: NO

## 2024-07-20 SDOH — HEALTH STABILITY: MENTAL HEALTH: HAVE YOU EVER DONE ANYTHING, STARTED TO DO ANYTHING, OR PREPARED TO DO ANYTHING TO END YOUR LIFE?: NO

## 2024-07-20 SDOH — SOCIAL STABILITY: SOCIAL INSECURITY
WITHIN THE LAST YEAR, HAVE TO BEEN RAPED OR FORCED TO HAVE ANY KIND OF SEXUAL ACTIVITY BY YOUR PARTNER OR EX-PARTNER?: NO

## 2024-07-20 SDOH — SOCIAL STABILITY: SOCIAL INSECURITY: WERE YOU ABLE TO COMPLETE ALL THE BEHAVIORAL HEALTH SCREENINGS?: YES

## 2024-07-20 SDOH — HEALTH STABILITY: MENTAL HEALTH: BEHAVIORS/MOOD: APPROPRIATE FOR SITUATION;COOPERATIVE

## 2024-07-20 SDOH — SOCIAL STABILITY: SOCIAL INSECURITY: WITHIN THE LAST YEAR, HAVE YOU BEEN HUMILIATED OR EMOTIONALLY ABUSED IN OTHER WAYS BY YOUR PARTNER OR EX-PARTNER?: NO

## 2024-07-20 SDOH — ECONOMIC STABILITY: INCOME INSECURITY: IN THE PAST 12 MONTHS, HAS THE ELECTRIC, GAS, OIL, OR WATER COMPANY THREATENED TO SHUT OFF SERVICE IN YOUR HOME?: NO

## 2024-07-20 SDOH — SOCIAL STABILITY: SOCIAL INSECURITY: HAS ANYONE EVER THREATENED TO HURT YOUR FAMILY OR YOUR PETS?: NO

## 2024-07-20 SDOH — ECONOMIC STABILITY: FOOD INSECURITY: WITHIN THE PAST 12 MONTHS, THE FOOD YOU BOUGHT JUST DIDN'T LAST AND YOU DIDN'T HAVE MONEY TO GET MORE.: NEVER TRUE

## 2024-07-20 SDOH — SOCIAL STABILITY: SOCIAL NETWORK: ARE YOU MARRIED, WIDOWED, DIVORCED, SEPARATED, NEVER MARRIED, OR LIVING WITH A PARTNER?: NEVER MARRIED

## 2024-07-20 SDOH — HEALTH STABILITY: MENTAL HEALTH
HOW OFTEN DO YOU NEED TO HAVE SOMEONE HELP YOU WHEN YOU READ INSTRUCTIONS, PAMPHLETS, OR OTHER WRITTEN MATERIAL FROM YOUR DOCTOR OR PHARMACY?: NEVER

## 2024-07-20 SDOH — SOCIAL STABILITY: SOCIAL INSECURITY: DOES ANYONE TRY TO KEEP YOU FROM HAVING/CONTACTING OTHER FRIENDS OR DOING THINGS OUTSIDE YOUR HOME?: NO

## 2024-07-20 SDOH — ECONOMIC STABILITY: INCOME INSECURITY: HOW HARD IS IT FOR YOU TO PAY FOR THE VERY BASICS LIKE FOOD, HOUSING, MEDICAL CARE, AND HEATING?: NOT HARD AT ALL

## 2024-07-20 SDOH — HEALTH STABILITY: MENTAL HEALTH: HAVE YOU BEEN THINKING ABOUT HOW YOU MIGHT DO THIS?: YES

## 2024-07-20 SDOH — SOCIAL STABILITY: SOCIAL INSECURITY: HAVE YOU HAD ANY THOUGHTS OF HARMING ANYONE ELSE?: NO

## 2024-07-20 SDOH — SOCIAL STABILITY: SOCIAL INSECURITY
WITHIN THE LAST YEAR, HAVE YOU BEEN KICKED, HIT, SLAPPED, OR OTHERWISE PHYSICALLY HURT BY YOUR PARTNER OR EX-PARTNER?: NO

## 2024-07-20 SDOH — SOCIAL STABILITY: SOCIAL NETWORK: HOW OFTEN DO YOU ATTEND CHURCH OR RELIGIOUS SERVICES?: MORE THAN 4 TIMES PER YEAR

## 2024-07-20 SDOH — ECONOMIC STABILITY: HOUSING INSECURITY: AT ANY TIME IN THE PAST 12 MONTHS, WERE YOU HOMELESS OR LIVING IN A SHELTER (INCLUDING NOW)?: NO

## 2024-07-20 SDOH — HEALTH STABILITY: MENTAL HEALTH
STRESS IS WHEN SOMEONE FEELS TENSE, NERVOUS, ANXIOUS, OR CAN'T SLEEP AT NIGHT BECAUSE THEIR MIND IS TROUBLED. HOW STRESSED ARE YOU?: RATHER MUCH

## 2024-07-20 SDOH — SOCIAL STABILITY: SOCIAL NETWORK: HOW OFTEN DO YOU GET TOGETHER WITH FRIENDS OR RELATIVES?: NEVER

## 2024-07-20 SDOH — SOCIAL STABILITY: SOCIAL INSECURITY: ARE THERE ANY APPARENT SIGNS OF INJURIES/BEHAVIORS THAT COULD BE RELATED TO ABUSE/NEGLECT?: NO

## 2024-07-20 SDOH — SOCIAL STABILITY: SOCIAL INSECURITY

## 2024-07-20 SDOH — HEALTH STABILITY: PHYSICAL HEALTH: ON AVERAGE, HOW MANY MINUTES DO YOU ENGAGE IN EXERCISE AT THIS LEVEL?: PATIENT UNABLE TO ANSWER

## 2024-07-20 SDOH — HEALTH STABILITY: PHYSICAL HEALTH
ON AVERAGE, HOW MANY DAYS PER WEEK DO YOU ENGAGE IN MODERATE TO STRENUOUS EXERCISE (LIKE A BRISK WALK)?: PATIENT UNABLE TO ANSWER

## 2024-07-20 SDOH — ECONOMIC STABILITY: TRANSPORTATION INSECURITY
IN THE PAST 12 MONTHS, HAS THE LACK OF TRANSPORTATION KEPT YOU FROM MEDICAL APPOINTMENTS OR FROM GETTING MEDICATIONS?: NO

## 2024-07-20 SDOH — SOCIAL STABILITY: SOCIAL NETWORK: HOW OFTEN DO YOU ATTENT MEETINGS OF THE CLUB OR ORGANIZATION YOU BELONG TO?: MORE THAN 4 TIMES PER YEAR

## 2024-07-20 SDOH — HEALTH STABILITY: MENTAL HEALTH: BEHAVIORS/MOOD: CALM;COOPERATIVE;PLEASANT

## 2024-07-20 SDOH — HEALTH STABILITY: MENTAL HEALTH: HOW MANY STANDARD DRINKS CONTAINING ALCOHOL DO YOU HAVE ON A TYPICAL DAY?: PATIENT DOES NOT DRINK

## 2024-07-20 SDOH — SOCIAL STABILITY: SOCIAL NETWORK: IN A TYPICAL WEEK, HOW MANY TIMES DO YOU TALK ON THE PHONE WITH FAMILY, FRIENDS, OR NEIGHBORS?: ONCE A WEEK

## 2024-07-20 SDOH — HEALTH STABILITY: MENTAL HEALTH: SLEEP PATTERN: RESTLESSNESS

## 2024-07-20 ASSESSMENT — COGNITIVE AND FUNCTIONAL STATUS - GENERAL
DAILY ACTIVITIY SCORE: 24
MOBILITY SCORE: 24

## 2024-07-20 ASSESSMENT — PATIENT HEALTH QUESTIONNAIRE - PHQ9
SUM OF ALL RESPONSES TO PHQ9 QUESTIONS 1 & 2: 4
1. LITTLE INTEREST OR PLEASURE IN DOING THINGS: MORE THAN HALF THE DAYS
2. FEELING DOWN, DEPRESSED OR HOPELESS: MORE THAN HALF THE DAYS

## 2024-07-20 ASSESSMENT — LIFESTYLE VARIABLES
NAUSEA AND VOMITING: NO NAUSEA AND NO VOMITING
HEADACHE, FULLNESS IN HEAD: NOT PRESENT
PRESCIPTION_ABUSE_PAST_12_MONTHS: NO
SKIP TO QUESTIONS 9-10: 1
AUDIT-C TOTAL SCORE: 0
AUDIT-C TOTAL SCORE: 0
CIWA TOTAL SCORE: 2
SUBSTANCE_ABUSE_PAST_12_MONTHS: NO
SKIP TO QUESTIONS 9-10: 1
ORIENTATION AND CLOUDING OF SENSORIUM: ORIENTED AND CAN DO SERIAL ADDITIONS
BLOOD PRESSURE: 121/80
HOW MANY STANDARD DRINKS CONTAINING ALCOHOL DO YOU HAVE ON A TYPICAL DAY: PATIENT DOES NOT DRINK
SUBSTANCE_ABUSE_PAST_12_MONTHS: NO
VISUAL DISTURBANCES: NOT PRESENT
HOW OFTEN DO YOU HAVE A DRINK CONTAINING ALCOHOL: NEVER
PRESCIPTION_ABUSE_PAST_12_MONTHS: NO
PAROXYSMAL SWEATS: NO SWEAT VISIBLE
AUDIT-C TOTAL SCORE: 0
HOW OFTEN DO YOU HAVE A DRINK CONTAINING ALCOHOL: NEVER
AGITATION: NORMAL ACTIVITY
SKIP TO QUESTIONS 9-10: 1
ANXIETY: 2
PULSE: 55
TREMOR: NO TREMOR
AUDITORY DISTURBANCES: NOT PRESENT
HOW OFTEN DO YOU HAVE 6 OR MORE DRINKS ON ONE OCCASION: NEVER
AUDIT-C TOTAL SCORE: 0
TOTAL_SCORE: 0
AUDIT-C TOTAL SCORE: 0
HOW OFTEN DO YOU HAVE 6 OR MORE DRINKS ON ONE OCCASION: NEVER
HOW MANY STANDARD DRINKS CONTAINING ALCOHOL DO YOU HAVE ON A TYPICAL DAY: PATIENT DOES NOT DRINK

## 2024-07-20 ASSESSMENT — ACTIVITIES OF DAILY LIVING (ADL)
BATHING: INDEPENDENT
PATIENT'S MEMORY ADEQUATE TO SAFELY COMPLETE DAILY ACTIVITIES?: YES
JUDGMENT_ADEQUATE_SAFELY_COMPLETE_DAILY_ACTIVITIES: YES
HEARING - RIGHT EAR: DIFFICULTY WITH NOISE
LACK_OF_TRANSPORTATION: NO
TOILETING: INDEPENDENT
ADEQUATE_TO_COMPLETE_ADL: YES
DRESSING YOURSELF: INDEPENDENT
HEARING - LEFT EAR: DIFFICULTY WITH NOISE
FEEDING YOURSELF: INDEPENDENT
GROOMING: INDEPENDENT
WALKS IN HOME: INDEPENDENT

## 2024-07-20 ASSESSMENT — ENCOUNTER SYMPTOMS
RESPIRATORY NEGATIVE: 1
EYES NEGATIVE: 1
CARDIOVASCULAR NEGATIVE: 1
GASTROINTESTINAL NEGATIVE: 1
MUSCULOSKELETAL NEGATIVE: 1
CONSTITUTIONAL NEGATIVE: 1
NEUROLOGICAL NEGATIVE: 1

## 2024-07-20 ASSESSMENT — PAIN SCALES - GENERAL
PAINLEVEL_OUTOF10: 0 - NO PAIN

## 2024-07-20 ASSESSMENT — PAIN - FUNCTIONAL ASSESSMENT
PAIN_FUNCTIONAL_ASSESSMENT: 0-10
PAIN_FUNCTIONAL_ASSESSMENT: 0-10

## 2024-07-20 ASSESSMENT — COLUMBIA-SUICIDE SEVERITY RATING SCALE - C-SSRS
2. HAVE YOU ACTUALLY HAD ANY THOUGHTS OF KILLING YOURSELF?: NO
1. SINCE LAST CONTACT, HAVE YOU WISHED YOU WERE DEAD OR WISHED YOU COULD GO TO SLEEP AND NOT WAKE UP?: NO
6. HAVE YOU EVER DONE ANYTHING, STARTED TO DO ANYTHING, OR PREPARED TO DO ANYTHING TO END YOUR LIFE?: NO

## 2024-07-20 NOTE — PROGRESS NOTES
Emergency Medicine Transition of Care Note.    I received Gerry Dhaliwal in signout from Dr. Greene.  Please see the previous ED provider note for all HPI, PE and MDM up to the time of signout. This is in addition to the primary record.    In brief Gerry Dhaliwal is an 19 y.o. male presenting for   Chief Complaint   Patient presents with    Suicidal     At the time of signout we were awaiting: Psychiatric placement    Diagnoses as of 07/20/24 0104   Suicidal ideation       Medical Decision Making  Patient has been medically cleared.  He is presenting for suicidal ideation with a plan to use a shotgun to commit suicide.  EPAT did evaluate the patient.  They are recommending admission.  He will be monitored in the emergency department till placement can be obtained.    Final diagnoses:   [R49.870] Suicidal ideation           Procedure  Procedures    Joan Maciel DO

## 2024-07-20 NOTE — PROGRESS NOTES
EPAT - Social Work Psychiatric Assessment    Arrival Details  Mode of Arrival: Ambulatory  Admission Source:  ()  Admission Type: Voluntary  EPAT Assessment Start Date: 07/19/24  EPAT Assessment Start Time: 1900  Name of : KAREEM Deutsch LSW    History of Present Illness  Admission Reason: suicidal ideation  HPI: Patient is a 19 year old male, with a history of MDD, brought in by commanding officer for suicidal ideation. ED provider note, nursing notes, [place] suicide risk scale and community records reviewed, patient reportedly made suicidal statement through text to ex girlfriend’s grandfather. He therefore contacted the "[employer] [place] regarding his message and informed the leadership that patient is having thoughts of hurting himself. The commanding officer and another officer sat down and talked to patient, he admitted to active SI with a plan to use a shotgun, they brought him to the ED for psych eval. Triage indicates high risk, negative BAL and UDS. Patient is not currently linked with any outpatient services or on psych meds. He used to see psychiatrist and therapist in middle school and high school, had one IP admission in freshman year for SI. No hx of SA or NSSIB. Patient has been in  for 3 years and 2 months now.    SW Readmission Information   Readmission within 30 Days: No    Psychiatric Symptoms  Anxiety Symptoms: No problems reported or observed.  Depression Symptoms: Appetite change, Change in energy level, Feelings of helplessness, Feelings of hopelessess, Feelings of worthlessness, Impaired concentration, Isolative, Loss of interest, Sleep disturbance  Amber Symptoms: No problems reported or observed.    Psychosis Symptoms  Hallucination Type: No problems reported or observed.  Delusion Type: No problems reported or observed.    Additional Symptoms - Adult  Generalized Anxiety Disorder: No problems reported or observed.  Obsessive Compulsive Disorder: No problems  reported or observed.  Panic Attack: No problems reported or observed.  Post Traumatic Stress Disorder: No problems reported or observed.  Delirium: No problems reported or observed.    Past Psychiatric History/Meds/Treatments  Past Psychiatric History: prior admission in  freshman year for SI // depression and anxiety on mother's side // denies trauma hx  Past Psychiatric Meds/Treatments: none  Past Violence/Victimization History: none    Current Mental Health Contacts   Name/Phone Number: none   Last Appointment Date: none  Provider Name/Phone Number: none  Provider Last Appointment Date: none    Support System: Immediate family, Community    Living Arrangement:  ([employer] [facility])    Home Safety  Feels Safe Living in Home: Yes    Income Information  Employment Status for: Patient  Employment Status: Employed  Income Source: Employed  Current/Previous Occupation: [email or web address] Service/Education History  Current or Previous  Service: Active duty, current  Education Level: High school  School History: on and off taking college online courses, reportedly last semester did not go well and didn't want to check the grades    Social/Cultural History  Social History: [place] citizen, from [place], poor relationship with family, barely talks to them  Cultural Requests During Hospitalization: none  Spiritual Requests During Hospitalization: none  Important Activities: Social    Legal  Legal Considerations: Patient/ Family Ability to Make Healthcare Decisions  Assistance with Managing/Advocating Healthcare Needs:  (self)  Criminal Activity/ Legal Involvement Pertinent to Current Situation/ Hospitalization: none    Drug Screening  Have you used any substances (canabis, cocaine, heroin, hallucinogens, inhalants, etc.) in the past 12 months?: No  Have you used any prescription drugs other than prescribed in the past 12 months?: No  Is a toxicology screen needed?: Yes               Orientation  Orientation Level: Oriented X4    General Appearance  Motor Activity: Unremarkable  Speech Pattern:  (regular rate and tone)  General Attitude: Guarded  Appearance/Hygiene: Unremarkable    Thought Process  Coherency:  (linear)  Content: Unremarkable  Delusions:  (none)  Perception: Not altered  Hallucination: None  Judgment/Insight: Impaired  Confusion: None  Cognition: Appropriate safety awareness    Sleep Pattern  Sleep Pattern:  (increased sleep)    Risk Factors  Self Harm/Suicidal Ideation Plan: plan to shoot himself  Previous Self Harm/Suicidal Plans: none  Risk Factors: Male,  history or weapons training, 1st psychiatric hospitalization by age 17    Violence Risk Assessment  Assessment of Violence: None noted  Thoughts of Harm to Others: No    Ability to Assess Risk Screen  Risk Screen - Ability to Assess: Able to be screened  Ask Suicide-Screening Questions  1. In the past few weeks, have you wished you were dead?: Yes  2. In the past few weeks, have you felt that you or your family would be better off if you were dead?: Yes  3. In the past week, have you been having thoughts about killing yourself?: Yes  4. Have you ever tried to kill yourself?: No  5. Are you having thoughts of killing yourself right now?: No  Calculated Risk Score: Potential Risk  [place] Suicide Severity Rating Scale (Screener/Recent Self-Report)  1. Wish to be Dead (Past 1 Month): Yes  2. Non-Specific Active Suicidal Thoughts (Past 1 Month): Yes  3. Active Suicidal Ideation with any Methods (Not Plan) Without Intent to Act (Past 1 Month): Yes  4. Active Suicidal Ideation with Some Intent to Act, Without Specific Plan (Past 1 Month): Yes  5. Active Suicidal Ideation with Specific Plan and Intent (Past 1 Month): Yes  6. Suicidal Behavior (Lifetime): No  6. Suicidal Behavior (3 Months): No  Calculated C-SSRS Risk Score (Lifetime/Recent): High Risk  Step 1: Risk Factors  Current & Past Psychiatric Dx: Mood  disorder  Presenting Symptoms: Impulsivity, Hopelessness or despair, Anxiety and/or panic  Precipitants/Stressors: Triggering events leading to humiliation, shame, and/or despair (e.g. loss of relationship, financial or health status) (real or anticipated), Social isolation  Change in Treatment: Non-compliant or not receiving treatment  Access to Lethal Methods : No  Step 2: Protective Factors   Protective Factors Internal: Ability to cope with stress, Frustration tolerance  Protective Factors External: Cultural, spiritual and/or moral attitudes against suicide, Engaged in work or school  Step 3: Suicidal Ideation Intensity  Most Severe Suicidal Ideation Identified: plan to shoot himself  How Many Times Have You Had These Thoughts: 2-5 times in a week  When You Have the Thoughts How Long do They Last : 1-4 hours/a lot of the time  Could/Can You Stop Thinking About Killing Yourself or Wanting to Die if You Want to: Can control thoughts with some difficulty  Are There Things - Anyone or Anything - That Stopped You From Wanting to Die or Acting on: Uncertain that deterrents stopped you  What Sort of Reasons Did You Have For Thinking About Wanting to Die or Killing Yourself: Completely to end or stop the pain (you couldn't go on living with the pain or how you were feeling)  Total Score: 17  Step 5: Documentation  Risk Level: Moderate suicide risk (high risk in outpatient setting, moderate risk in inpatient setting due to lack of access to methods and no active SI)    Prior to assessment, patient is calm and cooperative, comply with all lab works. Upon assessment, he presents as anxious with dysphoric affect. Patient endorses low mood, difficulty controlling worries, excessive worries, anhedonia, fatigue, helplessness, hopelessness, binge eating on sweets, persistent thoughts of death, irritability and impulsivity. He denies homicidal ideation, visual/auditory hallucinations or delusional thinking. Patient reports he  has been experiencing worsening depression and suicidal thoughts for the past several months due to finance, schooling, family issues, relationship and friendship. He seems to be minimizing his suicidality, reports vague SI. When being asked about his plans, he states “yeah I've been thinking about it, pretty often actually”, unable/unwilling to share. He denies having argument with ex gf's grandfather, stating they broke up 10/2023, and stopped contact long time ago. Patient does not seem internally stimulated or under acute distress, however he is unable to safety plan with future orientation, coping mechanisms, social or outpatient support.    Spoke with commanding officer Stephen, he reports great concerns for patient. He states he has known patient for the past 1.5 years, “he has always been a quiet, kept to himself and self proclaimed person, however at times heard about him being emotional, interjecting into groups and not engaging with peers”. He states patient has shared some issues with ex-girlfriend and one female friend in [place], as well as disagreements with family. Stephen reports he sat down with patient and another officer this afternoon after getting a notice from his supervisor, patient admitted to having plan to shoot himself or use a shotgun, “he said he wouldn't do it in  but don't what will happen when he goes home to [place]”. He reports concerns for his increasing depression and lack of supportive in his life, “I'm afraid when he goes home at the end of the summer, he would do something to himself with the triggers of family and female friend”. Stephen states he works Mon-Fri, his supervisor is still deciding on his order regarding his current mental health, he would like to be reached by psych unit with discharge planning.    Due to his SI symptoms, anhedonia, fatigue, helplessness, limited social/outpatient support, multiple psychosocial stressors, and impulsivity, patient  continues to be considered as an increasing risk of harm to himself. He is being recommended for psychiatric hospitalization for safety and stabilization, Dr. Greene in agreement.       Psychiatric Impression and Plan of Care  Assessment and Plan: see above  Specific Resources Provided to Patient: psychiatric hospitalization  CM Notified: none  PHP/IOP Recommended: none    Outcome/Disposition  Patient's Perception of Outcome Achieved: patient wants to leave  Assessment, Recommendations and Risk Level Reviewed with: Dr. Servin  Contact Name: Stephen Marquez  Contact Number(s): [phone]  Contact Relationship: commanding officer  EPAT Assessment Completed Date: 07/19/24  EPAT Assessment Completed Time: 1940

## 2024-07-20 NOTE — SIGNIFICANT EVENT
Application for Emergency Admission      Ready for Transfer?  Is the patient medically cleared for transfer to inpatient psychiatry: Yes  Has the patient been accepted to an inpatient psychiatric hospital: Yes    Application for Emergency Admission  IN ACCORDANCE WITH [place]122[place]10 [place]  The Chief Clinical Officer of: [facility] 7/20/24 12:05    Reason for Hospitalization  The undersigned has reason to believe that: Gerry Dhaliwal Is a mentally ill person subject to hospitalization by court order under division B Section 5122.01 of the Revised Code, i.e., this person:    1.Yes  Represents a substantial risk of physical harm to self as manifested by evidence of threats of, or attempts at, suicide or serious self-inflicted bodily harm    2.No Represents a substantial risk of physical harm to others as manifested by evidence of recent homicidal or other violent behavior, evidence of recent threats that place another in reasonable fear of violent behavior and serious physical harm, or other evidence of present dangerousness    3.No Represents a substantial and immediate risk of serious physical impairment or injury to self as manifested by  evidence that the person is unable to provide for and is not providing for the person's basic physical needs because of the person's mental illness and that appropriate provision for those needs cannot be made  immediately available in the community    4.Yes Would benefit from treatment in a hospital for his mental illness and is in need of such treatment as manifested by evidence of behavior that creates a grave and imminent risk to substantial rights of others or  himself.    5.No Would benefit from treatment as manifested by evidence of behavior that indicates all of the following:       (a) The person is unlikely to survive safely in the community without supervision, based on a clinical determination.       (b) The person has a history of lack of compliance with  treatment for mental illness and one of the following applies:      (i) At least twice within the thirty-six months prior to the filing of an affidavit seeking court-ordered treatment of the person under section 5122.111 of the Revised Code, the lack of compliance has been a significant factor in necessitating hospitalization in a hospital or receipt of services in a forensic or other mental health unit of a correctional facility, provided that the thirty-six-month period shall be extended by the length of any hospitalization or incarceration of the person that occurred within the thirty-six-month period.      (ii) Within the forty-eight months prior to the filing of an affidavit seeking court-ordered treatment of the person under section 5122.111 of the Revised Code, the lack of compliance resulted in one or more acts of serious violent behavior toward self or others or threats of, or attempts at, serious physical harm to self or others, provided that the forty-eight-month period shall be extended by the length of any hospitalization or incarceration of the person that occurred within the forty-eight-month period.      (c) The person, as a result of mental illness, is unlikely to voluntarily participate in necessary treatment.       (d) In view of the person's treatment history and current behavior, the person is in need of treatment in order to prevent a relapse or deterioration that would be likely to result in substantial risk of serious harm to the person or others.    (e) Represents a substantial risk of physical harm to self or others if allowed to remain at liberty pending examination.    Therefore, it is requested that said person be admitted to the above named facility.    STATEMENT OF BELIEF    Must be filled out by one of the following: a psychiatrist, licensed physician, licensed clinical psychologist, health or ,  or .  (Statement shall include the circumstances under  which the individual was taken into custody and the reason for the person's belief that hospitalization is necessary. The statement shall also include a reference to efforts made to secure the individual's property at his residence if he was taken into custody there. Every reasonable and appropriate effort should be made to take this person into custody in the least conspicuous manner possible.)    Patient presents the emergency department secondary to suicidal ideation.  Patient is suicidal with a plan of harming himself with a gun.  At this time he would benefit from hospitalization and psychiatric stabilization.    Nelida Greene MD 7/19/2024     _____________________________________________________________   Place of Employment: Nelida Greene MD    STATEMENT OF OBSERVATION BY PSYCHIATRIST, LICENSED PHYSICIAN, OR LICENSED CLINICAL PSYCHOLOGIST, IF APPLICABLE    Place of Observation (e.g., [facility] center, general hospital, office, emergency facility)  (If applicable, please complete)    Nelida Greene MD 7/19/2024    _____________________________________________________________

## 2024-07-20 NOTE — CONSULTS
Consults    Reason For Consult  Medical management    History Of Present Illness  Gerry Dhaliwal is a 19 y.o. male who was admitted to the [facility] after presenting to the emergency department with suicidal ideation.  The patient was medically cleared for inpatient psychiatric evaluation.  He has no medical problems and no surgical history.       Past Medical History  He has no past medical history on file.    Surgical History  He has no past surgical history on file.     Social History  He has no history on file for tobacco use, alcohol use, and drug use.    Family History  CAD     Allergies  Patient has no known allergies.    Review of Systems  Review of Systems   Constitutional: Negative.    HENT: Negative.     Eyes: Negative.    Respiratory: Negative.     Cardiovascular: Negative.    Gastrointestinal: Negative.    Musculoskeletal: Negative.    Skin: Negative.    Neurological: Negative.    Psychiatric/Behavioral:  Positive for suicidal ideas.          Physical Exam  Physical Exam  Cardiovascular:      Pulses: Normal pulses.      Heart sounds: Normal heart sounds.   Pulmonary:      Effort: Pulmonary effort is normal.      Breath sounds: Normal breath sounds.   Abdominal:      Palpations: Abdomen is soft.   Musculoskeletal:      Cervical back: Normal range of motion.   Neurological:      Mental Status: He is alert.   Psychiatric:         Mood and Affect: Mood is depressed.         Thought Content: Thought content includes suicidal ideation.             Last Recorded Vitals  Wt 61.7 kg (136 lb 0.4 oz)     Relevant Results  No results found for this or any previous visit (from the past 24 hour(s)).     === 07/16/24 ===    XR CHEST 2 VIEWS    - Impression -  No acute cardiopulmonary disease.  Signed by Gabriel Stroud MD       Patient Active Problem List   Diagnosis    Depression with suicidal ideation          Assessment/Plan     Gerry Dhaliwal is a 19 y.o. male who was admitted to the [facility] after presenting to the emergency  department with suicidal ideation.    Suicidal ideation  -Managed by psychiatry    Jennifer Sorenson, APRN-CNP

## 2024-07-20 NOTE — CARE PLAN
"The patient's goals for the shift include \"I don't know.\"    The clinical goals for the shift include Maintain patient safety, medication adherence, participation in unit activities.    Over the shift, the patient did make progress toward the following goals. Barriers to progression include none. Recommendations to address these barriers include none.    "

## 2024-07-21 VITALS
BODY MASS INDEX: 20.92 KG/M2 | HEIGHT: 68 IN | RESPIRATION RATE: 16 BRPM | OXYGEN SATURATION: 99 % | SYSTOLIC BLOOD PRESSURE: 119 MMHG | HEART RATE: 53 BPM | WEIGHT: 138.01 LBS | TEMPERATURE: 97.3 F | DIASTOLIC BLOOD PRESSURE: 74 MMHG

## 2024-07-21 LAB
25(OH)D3 SERPL-MCNC: 27 NG/ML (ref 30–100)
CHOLEST SERPL-MCNC: 170 MG/DL (ref 0–199)
CHOLESTEROL/HDL RATIO: 4
GLUCOSE P FAST SERPL-MCNC: 79 MG/DL (ref 74–99)
HDLC SERPL-MCNC: 42.8 MG/DL
LDLC SERPL CALC-MCNC: 115 MG/DL
NON HDL CHOLESTEROL: 127 MG/DL (ref 0–119)
TRIGL SERPL-MCNC: 59 MG/DL (ref 0–149)
VLDL: 12 MG/DL (ref 0–40)

## 2024-07-21 PROCEDURE — 82947 ASSAY GLUCOSE BLOOD QUANT: CPT | Performed by: PSYCHIATRY & NEUROLOGY

## 2024-07-21 PROCEDURE — 99223 1ST HOSP IP/OBS HIGH 75: CPT | Performed by: PSYCHIATRY & NEUROLOGY

## 2024-07-21 PROCEDURE — 36415 COLL VENOUS BLD VENIPUNCTURE: CPT | Performed by: PSYCHIATRY & NEUROLOGY

## 2024-07-21 PROCEDURE — 82306 VITAMIN D 25 HYDROXY: CPT | Mod: GEALAB | Performed by: PSYCHIATRY & NEUROLOGY

## 2024-07-21 PROCEDURE — 83718 ASSAY OF LIPOPROTEIN: CPT | Performed by: PSYCHIATRY & NEUROLOGY

## 2024-07-21 PROCEDURE — 1240000001 HC SEMI-PRIVATE BH ROOM DAILY

## 2024-07-21 SDOH — SOCIAL STABILITY: SOCIAL INSECURITY: ARE YOU OR HAVE YOU BEEN THREATENED OR ABUSED PHYSICALLY, EMOTIONALLY, OR SEXUALLY BY ANYONE?: NO

## 2024-07-21 SDOH — SOCIAL STABILITY: SOCIAL INSECURITY: DO YOU FEEL UNSAFE GOING BACK TO THE PLACE WHERE YOU ARE LIVING?: NO

## 2024-07-21 SDOH — SOCIAL STABILITY: SOCIAL INSECURITY: HAVE YOU HAD THOUGHTS OF HARMING ANYONE ELSE?: NO

## 2024-07-21 SDOH — SOCIAL STABILITY: SOCIAL INSECURITY: WERE YOU ABLE TO COMPLETE ALL THE BEHAVIORAL HEALTH SCREENINGS?: YES

## 2024-07-21 SDOH — SOCIAL STABILITY: SOCIAL INSECURITY: ABUSE: ADULT

## 2024-07-21 SDOH — SOCIAL STABILITY: SOCIAL INSECURITY: DO YOU FEEL ANYONE HAS EXPLOITED OR TAKEN ADVANTAGE OF YOU FINANCIALLY OR OF YOUR PERSONAL PROPERTY?: NO

## 2024-07-21 SDOH — SOCIAL STABILITY: SOCIAL INSECURITY: ARE THERE ANY APPARENT SIGNS OF INJURIES/BEHAVIORS THAT COULD BE RELATED TO ABUSE/NEGLECT?: NO

## 2024-07-21 SDOH — HEALTH STABILITY: MENTAL HEALTH: EXPERIENCED ANY OF THE FOLLOWING LIFE EVENTS: SOCIAL LOSS (BANKRUPTCY, DIVORCE, WORK-RELATED STRESS)

## 2024-07-21 SDOH — SOCIAL STABILITY: SOCIAL INSECURITY: HAS ANYONE EVER THREATENED TO HURT YOUR FAMILY OR YOUR PETS?: NO

## 2024-07-21 SDOH — SOCIAL STABILITY: SOCIAL INSECURITY: HAVE YOU HAD ANY THOUGHTS OF HARMING ANYONE ELSE?: NO

## 2024-07-21 SDOH — SOCIAL STABILITY: SOCIAL INSECURITY: DOES ANYONE TRY TO KEEP YOU FROM HAVING/CONTACTING OTHER FRIENDS OR DOING THINGS OUTSIDE YOUR HOME?: NO

## 2024-07-21 ASSESSMENT — PAIN - FUNCTIONAL ASSESSMENT
PAIN_FUNCTIONAL_ASSESSMENT: 0-10
PAIN_FUNCTIONAL_ASSESSMENT: 0-10

## 2024-07-21 ASSESSMENT — LIFESTYLE VARIABLES
HOW OFTEN DO YOU HAVE 6 OR MORE DRINKS ON ONE OCCASION: NEVER
HOW OFTEN DO YOU HAVE A DRINK CONTAINING ALCOHOL: NEVER
PRESCIPTION_ABUSE_PAST_12_MONTHS: NO
AUDIT-C TOTAL SCORE: 0
HOW MANY STANDARD DRINKS CONTAINING ALCOHOL DO YOU HAVE ON A TYPICAL DAY: PATIENT DOES NOT DRINK
SKIP TO QUESTIONS 9-10: 1
AUDIT-C TOTAL SCORE: 0
SUBSTANCE_ABUSE_PAST_12_MONTHS: NO

## 2024-07-21 ASSESSMENT — ENCOUNTER SYMPTOMS
CONSTITUTIONAL NEGATIVE: 1
GASTROINTESTINAL NEGATIVE: 1
MUSCULOSKELETAL NEGATIVE: 1
RESPIRATORY NEGATIVE: 1
ALLERGIC/IMMUNOLOGIC NEGATIVE: 1
NEUROLOGICAL NEGATIVE: 1
ENDOCRINE NEGATIVE: 1
HEMATOLOGIC/LYMPHATIC NEGATIVE: 1
EYES NEGATIVE: 1
CARDIOVASCULAR NEGATIVE: 1

## 2024-07-21 ASSESSMENT — PAIN SCALES - GENERAL
PAINLEVEL_OUTOF10: 0 - NO PAIN
PAINLEVEL_OUTOF10: 0 - NO PAIN

## 2024-07-21 ASSESSMENT — ACTIVITIES OF DAILY LIVING (ADL): LACK_OF_TRANSPORTATION: NO

## 2024-07-21 ASSESSMENT — PATIENT HEALTH QUESTIONNAIRE - PHQ9
1. LITTLE INTEREST OR PLEASURE IN DOING THINGS: NEARLY EVERY DAY
SUM OF ALL RESPONSES TO PHQ9 QUESTIONS 1 & 2: 6
2. FEELING DOWN, DEPRESSED OR HOPELESS: NEARLY EVERY DAY

## 2024-07-21 ASSESSMENT — COLUMBIA-SUICIDE SEVERITY RATING SCALE - C-SSRS
6. HAVE YOU EVER DONE ANYTHING, STARTED TO DO ANYTHING, OR PREPARED TO DO ANYTHING TO END YOUR LIFE?: NO
1. SINCE LAST CONTACT, HAVE YOU WISHED YOU WERE DEAD OR WISHED YOU COULD GO TO SLEEP AND NOT WAKE UP?: NO
2. HAVE YOU ACTUALLY HAD ANY THOUGHTS OF KILLING YOURSELF?: NO

## 2024-07-21 NOTE — PROGRESS NOTES
" REHAB Therapy Assessment & Treatment    Patient Name: Gerry Dhaliwal  MRN: [id number]  Today's Date: 7/21/2024    Activity Assessment:  Initial Assessment  Attention Span: 15-30 Miutes  Cognitive Behavior Status/Orientation: Person, Time, Attentive, Capable  Crisis Triggers: Emotions, Family/friends, Finances, Mood (money, schooling, recent breakup with significant other)  Emotional Concerns/Mood/Affect: Alert, Anxious, Cooperative, Disinterested, Guarded  Hearing: Adequate  Memory: Memory intact  Motivation Level: Maximum encouragement needed  Negative Coping Skills:  (denied any negative coping skills/behaviors)  Speech/Communication/Socialization: Verbal, Responds when approached  Vision: Adequate  Additional Comments:  (refer to end of assessment additional comments)    Leisure Survey:  [facility] Leisure Interest Survey  Activity Preference: Independent  Activity Tolerance: Fair 15-30 minutes  At Home ADL Deficits:  (denied any at this time)  Barriers to Leisure Participation: Emotions, Lack of finances/money, Mood/affect, Personality, No one to participate with (\"not reacting properly to people\", \"too emotional\")  Community Resources: Aware of resources however does not utilize resources  Creative Activities:  (\"nothing creative\")  Education/School:  (high school, some college)  Following Directions: Able to follow simple commands  Leisure Interests: Needs to expand leisure interests, Not active with identified interests  Living Arrangement: Relative, Other (Comment) ([employer] training facilities)  Motivators for Recreation/Leisure Involvement: Relaxation, Fun/entertainment  Outdoor Activities: Hiking, Trips/traveling (\"If I had friends I would do more\", interested in traveling)  Passive Games: Video games (with brother, online friends)  Patient/Family Education Needs:  (Leisure Awareness/Education, Coping Strategies)  Patient Strengths:  (\"I don't know\")  Patient Weakness:  (\"emotional\")  Physial Activity: " "Fitness/exercise (past sports)  Social/Group Activities: Spiritism/Mandaeism activities, Volunteering (\"2 bible study groups\", \"2 to 3 services weekly\", \"outreach programs with Judaism\")  Solitary Activities: Music (driving)  Special Hobbies:  (\"distractions, video games, YouTube, deep breathing, isolation\")  Spectator Events:  (none reported)  Transportation: Drives  Work/Volunteer:  ([employer]/ x3 years)  Additional Comments: Mr. Dhaliwal was met with 1:1 in the unit hallway. He has a history of depression. Patient was admitted for suicidal ideations. Patient reported that he is “feeling anxious” with being in the hospital and doesn’t agree with the admission. Patient identified that he communicated some depressive symptoms with the grandfather of his ex-girlfriend whom he is close with. The grandfather has a history in the  and made a few phone calls, and “now I’m here”. Patient did admit that he has had some increased depressive symptoms over the last few months. He believes it directly relates to his loss of relationship that was “unexpected” for him. Patient identified that he would like to be discharged and return to “training and then back home in [place]”. Patient reported not having many friends and “avoids social situations”. He admitted to having negative thoughts and difficulties managing his emotions. Patient was informed of the unit programming and encouraged to attend groups. He identified that he “doesn’t plan on attending much and just wants to leave”. Patient was offered independent leisure activities and identified that all he would like to do is “write a letter”. Mr. Dhaliwal will be encouraged to attend a variety of groups.    Therapeutic Recreation:     Encounter Problems       Encounter Problems (Active)       Emotion Regulation BH RT       To learn to accept and tolerate emotions and feelings non-judgementally       Start:  07/21/24    Expected End:  08/02/24               Emotional BH " RT       Stress       Start:  07/21/24    Expected End:  08/02/24               Recreation BH RT       Awareness       Start:  07/21/24    Expected End:  08/02/24

## 2024-07-21 NOTE — SIGNIFICANT EVENT
07/21/24 1509   Able to Complete Psychiatric Screening   Were you able to complete all the behavioral health screenings? Yes   Abuse Screen   Abuse Screen Adult   Have you had any thoughts of harming anyone else? No   Are you or have you been threatened or abused physically, emotionally, or sexually by anyone? No   Has anyone ever threatened to hurt your family or your pets? No   Does anyone try to keep you from having/contacting other friends or doing things outside your home? No   Do you feel UNSAFE going back to the place where you are living? No   Do you feel anyone has exploited or taken advantage of you financially or of your personal property? No   Are there any apparent signs of injuries/behaviors that could be related to abuse/neglect? No   Trauma/Abuse Assessment   Physical Abuse Denies, provider concerned (Comment)   Verbal Abuse Denies, provider concerned (Comment)   Experienced Any of the Following Life Events Social loss (Bankruptcy, divorce, work-related stress)   Drug Screening   Have you used any substances (canabis, cocaine, heroin, hallucinogens, inhalants, etc.) in the past 12 months? No   Have you used any prescription drugs other than prescribed in the past 12 months? No   Is a toxicology screen needed? Yes   Audit Alcohol Screening   Q1: How often do you have a drink containing alcohol? Never   Q2: How many drinks containing alcohol do you have on a typical day when you are drinking? None   Q3: How often do you have six or more drinks on one occasion? Never   Audit-C Score 0   Over the past 2 weeks, how often have you been bothered by any of the following problems?   Little interest or pleasure in doing things Almost all   Feeling down, depressed, or hopeless Almost all   Have you had thoughts of harming anyone else? No   Values/Beliefs   Cultural Requests During Hospitalization na   Spiritual Requests During Hospitalization na   Patient Strengths/Barriers   Strengths (Must Choose Two)  Spirituality;Recreational/leisure/hobbies   Barriers Support of organized community;Motivation level for treatment;Support from family;Support from friends   Consults    Consult Needed Yes (Comment)   Spiritual Care Consult Needed No     Social Work Psychiatric Assessment     Arrival Details  Mode of Arrival: Ambulance  Admission Source: [facility]   Admission Type: Involuntary     History of Present Illness  Admission Reason: suicidal ideation  (Per EPAT): HPI: Patient is a 19 year old male, with a history of MDD, brought in by commanding officer for suicidal ideation. ED provider note, nursing notes, [facility] suicide risk scale and community records reviewed, patient reportedly made suicidal statement through text to ex girlfriend's grandfather. He therefore contacted the [employer] regarding his message and informed the leadership that patient is having thoughts of hurting himself. The commanding officer and another officer sat down and talked to patient, he admitted to active SI with a plan to use a shotgun, they brought him to the ED for psych eval. Triage indicates high risk, negative BAL and UDS. Patient is not currently linked with any outpatient services or on psych meds. He used to see psychiatrist and therapist in middle school and high school, had one IP admission in freshman year for SI. No hx of SA or NSSIB. Patient has been in  for 3 years and 2 months now. Prior to assessment, patient is calm and cooperative, comply with all lab works. Upon assessment, he presents as anxious with dysphoric affect. Patient endorses low mood, difficulty controlling worries, excessive worries, anhedonia, fatigue, helplessness, hopelessness, binge eating on sweets, persistent thoughts of death, irritability and impulsivity. He denies homicidal ideation, visual/auditory hallucinations or delusional thinking. Patient reports he has been experiencing worsening depression and suicidal  thoughts for the past several months due to finance, schooling, family issues, relationship and friendship. He seems to be minimizing his suicidality, reports vague SI. When being asked about his plans, he states “yeah I've been thinking about it, pretty often actually”, unable/unwilling to share. He denies having argument with ex gf's grandfather, stating they broke up 10/2023, and stopped contact long time ago. Patient does not seem internally stimulated or under acute distress, however he is unable to safety plan with future orientation, coping mechanisms, social or outpatient support.     Spoke with commanding officer Stephen, he reports great concerns for patient. He states he has known patient for the past 1.5 years, “he has always been a quiet, kept to himself and self-proclaimed person, however at times heard about him being emotional, interjecting into groups and not engaging with peers”. He states patient has shared some issues with ex-girlfriend and one female friend in [place], as well as disagreements with family. Stephen reports he sat down with patient and another officer this afternoon after getting a notice from his supervisor, patient admitted to having plan to shoot himself or use a shotgun, “he said he wouldn't do it in  but don't what will happen when he goes home to [place]”. He reports concerns for his increasing depression and lack of supportive in his life, “I'm afraid when he goes home at the end of the summer, he would do something to himself with the triggers of family and female friend”. Stephen states he works Mon-Fri, his supervisor is still deciding on his order regarding his current mental health, he would like to be reached by psych unit with discharge planning.     Due to his SI symptoms, anhedonia, fatigue, helplessness, limited social/outpatient support, multiple psychosocial stressors, and impulsivity, patient continues to be considered as an increasing risk of harm to  himself. He is being recommended for psychiatric hospitalization for safety and stabilization, Dr. Greene in agreement.         Readmission Information   Readmission within 30 Days: No     Psychiatric Symptoms  Anxiety Symptoms: Endorses some anxiety  Depression Symptoms: Appetite change, Change in energy level, Feelings of helplessness, Feelings of hopelessness, Feelings of worthlessness, Impaired concentration, Isolative, Loss of interest, Sleep disturbance, suicidal ideation or plan, and guns or weapons in household   Amber Symptoms: No problems reported or observed.     Psychosis Symptoms  Hallucination Type: No problems reported or observed.  Delusion Type: No problems reported or observed.     Additional Symptoms - Adult  Generalized Anxiety Disorder: No problems reported or observed.  Obsessive Compulsive Disorder: No problems reported or observed.  Panic Attack: No problems reported or observed.  Post Traumatic Stress Disorder: No problems reported or observed.  Delirium: No problems reported or observed.     Past Psychiatric History/Meds/Treatments  Past Psychiatric History: 1) Past Dx: DMDD.                                            2) One prior psychiatric hospitalization: 13 years-old for depression and suicidal ideation for one week ([place]).                                            3) Denies any prior suicide attempts                                            4) Owns a shot gun and refuses to disclose location.                                            5) No prior SIB                                            6) No prior rehab treatment programs                                            7) Outpt MH Tx: None since 12/2022 - high school  therapist)                                            8) Current psych meds: None    Past Psychiatric Meds/Treatments: Risperidone, Fluoxetine, Adderall  Past Violence/Victimization History: none    Family History: 1) Mother - anxiety                              2) MGM - depression                             3) No known suicides in the family.     Substance Use History: 1) Tobacco - Denies                                          2) ETOH - Denies                                          3) Cannabis - Denies                                          4) Denies any illicit drug use.    Current Mental Health Contacts   Name/Phone Number: none   Last Appointment Date: none  Provider Name/Phone Number: none  Provider Last Appointment Date: none     Support System: Immediate family, Community     [place] Arrangement[place]with mom and younger brother when not on [place]     Home Safety  Feels Safe Living in Home: Yes     Income Information  Employment Status for: Patient  Employment Status: Unemployed  Income Source: [employer]/Family  Current/Previous Occupation:       Service/Education History  Current or Previous  Service: Active duty, current  Education Level: High school  School History: on and off taking college online courses, reportedly last semester did not go well and didn't want to check the grades     Social/Cultural History  Social History: The patient graduated high school, and completed a semester of on-line college. He is with the [employer] (part-time). Unemployed x 3 months (worked a week full time about 4 weeks ago). Last worked at [employer]. His work history includes working at [employer] and [employer] (). Never . No children. No significant legal history. The patient lives with his mother and 15 year-old brother in his mom's house in [place]. No history of abuse per pt report.    Cultural Requests During Hospitalization: none  Spiritual Requests During Hospitalization: none  Important Activities: Social     Legal  Legal Considerations: Patient/ Family Ability to Make Healthcare Decisions  Assistance with Managing/Advocating Healthcare Needs:   (self)  Criminal Activity/ Legal Involvement Pertinent to Current Situation/ Hospitalization: none     Drug Screening  Have you used any substances (cannabis, cocaine, heroin, hallucinogens, inhalants, etc.) in the past 12 months?: No  Have you used any prescription drugs other than prescribed in the past 12 months?: No  Is a toxicology screen needed?: Yes           Orientation  Orientation Level: Oriented X4     General Appearance  Motor Activity: Unremarkable  Speech Pattern:  (regular rate and tone)  General Attitude: Guarded  Appearance/Hygiene: Unremarkable     Thought Process  Coherency:  (linear)  Content: Unremarkable  Delusions:  (none)  Perception: Not altered  Hallucination: None  Judgment/Insight: Impaired  Confusion: None  Cognition: Appropriate safety awareness     Sleep Pattern  Sleep Pattern:  (increased sleep)     Risk Factors  Self Harm/Suicidal Ideation Plan: plan to shoot himself  Previous Self Harm/Suicidal Plans: none  Risk Factors: Male,  history or weapons training, 1st psychiatric hospitalization by age 17     Violence Risk Assessment  Assessment of Violence: None noted  Thoughts of Harm to Others: No     Ability to Assess Risk Screen  Risk Screen - Ability to Assess: Able to be screened  Ask Suicide-Screening Questions  1. In the past few weeks, have you wished you were dead?: Yes  2. In the past few weeks, have you felt that you or your family would be better off if you were dead?: Yes  3. In the past week, have you been having thoughts about killing yourself?: Yes  4. Have you ever tried to kill yourself?: No  5. Are you having thoughts of killing yourself right now?: No  Calculated Risk Score: Potential Risk  [place] Suicide Severity Rating Scale (Screener/Recent Self-Report)  1. Wish to be Dead (Past 1 Month): Yes  2. Non-Specific Active Suicidal Thoughts (Past 1 Month): Yes  3. Active Suicidal Ideation with any Methods (Not Plan) Without Intent to Act (Past 1 Month): Yes  4.  Active Suicidal Ideation with Some Intent to Act, Without Specific Plan (Past 1 Month): Yes  5. Active Suicidal Ideation with Specific Plan and Intent (Past 1 Month): Yes  6. Suicidal Behavior (Lifetime): No  6. Suicidal Behavior (3 Months): No  Calculated C-SSRS Risk Score (Lifetime/Recent): High Risk  Step 1: Risk Factors  Current & Past Psychiatric Dx: Mood disorder  Presenting Symptoms: Impulsivity, Hopelessness or despair, Anxiety and/or panic  Precipitants/Stressors: Triggering events leading to humiliation, shame, and/or despair (e.g. loss of relationship, financial or health status) (real or anticipated), Social isolation  Change in Treatment: Non-compliant or not receiving treatment  Access to Lethal Methods : No  Step 2: Protective Factors   Protective Factors Internal: Ability to cope with stress, Frustration tolerance  Protective Factors External: Cultural, spiritual and/or moral attitudes against suicide, Engaged in work or school  Step 3: Suicidal Ideation Intensity  Most Severe Suicidal Ideation Identified: plan to shoot himself  How Many Times Have You Had These Thoughts: 2-5 times in a week  When You Have the Thoughts How Long do They Last : 1-4 hours/a lot of the time  Could/Can You Stop Thinking About Killing Yourself or Wanting to Die if You Want to: Can control thoughts with some difficulty  Are There Things - Anyone or Anything - That Stopped You From Wanting to Die or Acting on: Uncertain that deterrents stopped you  What Sort of Reasons Did You Have For Thinking About Wanting to Die or Killing Yourself: Completely to end or stop the pain (you couldn't go on living with the pain or how you were feeling)  Total Score: 17  Step 5: Documentation  Risk Level: Moderate suicide risk (high risk in outpatient setting, moderate risk in inpatient setting due to lack of access to methods and no active SI)           Psychiatric Impression and Plan of Care  Gerry is a 19 y.o. BM admitted to [facility] for SI. Pt  "made some SI statements and was then [place] and brought to the hospital. Prior to assessment, pt's provider notes, triage notes, and community record were reviewed. Pt has one prior hospitalization, also for SI, when he was a freshman in . He reports his parents  in 2013. He is one of five children and has a twin brother. He does not feel especially close to his family. He does not want his mother to know that he is here or that he bought a gun. He refuses to disclose location of gun. When asked why he doesn't want mom to know about the gun he stated \"she would think that I was going to shoot her and my brother\". When asked why she would think this he does not give an adequate response. He does endorse constant suicidal thoughts and thinking of various ways he could do it, but then states he has never made an attempt. He also states that he would not make an attempt [place]. He cannot guarentee his safety once he returns home at the end of the summer. He does not want to trial medication or go to therapy. He states he has tried these things in the past and it only makes it worse. Pt sitting in bed during interview, poor eye contact, soft speech, appears very irritated. Pt remains involuntary. He did sign a LATONIA to speak with commanding officer, Stephen Marquez.    Stabilize; medications per MD order  Milieu therapy  OT consult  Obtain collateral  Link with community mental health  [place]  "

## 2024-07-21 NOTE — NURSING NOTE
Patient assessed while in his room, laying in his bed.  He was minimally responsive to the assessment questions.  Denied any anxiety, depression, suicidal ideations, and hallucinations.  Stated a coping skill of reading, was not able to identify a goal or strength.  Patient had no scheduled medications and did not require or request any PRN medications.  Informed patient to come to the nurses station if he had any needs.  Will continue to monitor.      7/21/24   0540  Patient able to sleep well through the night.  No requests or needs for PRN medications.  No changes from previous assessment.

## 2024-07-21 NOTE — GROUP NOTE
Group Topic: Gross Motor/Balance Skills   Group Date: 7/21/2024  Start Time: 1100  End Time: 1205  Facilitators: SAÚL FloresS   Department: [facility] REHAB THERAPY VIRTUAL    Number of Participants: 8   Group Focus: Coordinating movement, leisure skills  Treatment Modality: Recreation Therapy  Interventions utilized were: Nintendo Wii, Music, leisure development  Purpose: Leisure Awareness, Social Stimulation, Physical Activity, Cognitive Stimulation, Pleasurable Activity, coping skills    Name: Gerry Dhaliwal YOB: 2005   MR: [id number]      Facilitator: Recreational Therapist  Level of Participation: did not attend  Progress: None  Comments: This session involved participating in multiple movement games via the [employer]. Patient participants were encouraged to practice coordinating motor movements, socialize with peers, and increase leisure awareness. For those participants that don't utilize technology for leisure they will be assisted with cognitive and movement tasks.      Patient remained in their room throughout the session for unknown reasons.     Plan: continue with services

## 2024-07-21 NOTE — H&P
"Physician Certification/Re-Certification: INITIAL   I certify that the inpatient psychiatric hospital admission is medically necessary for:  treatment which could reasonably be expected to improve the patient's condition that could not be provided in a less restrictive setting   I estimate the period of hospitalization are necessary for treatment of this patient will be:  7-14 days   My plans for post hospital care for this patient are:  home           Chief Complaint: \"Mental issues\"    History Of Present Illness  Gerry Dhaliwal is a 19 y.o. year old male patient who was brought to the ED by his commanding officer for suicidal thoughts and a possible plan to shot himself with his shotgun (after first texting suicidal statements to his ex-girlfriend's grandfather) (see Epat note below). On admission, Gerry reports experiencing feelings of depression for the past 3 months (mostly due to breakup with his ex-GF), along with mildly decreased sleep (initial insomnia), mildly decreased concentration, increased feelings of hopelessness and helplessness and worthlessness, and mild anhedonia, all for the past 3 months. He also reports experiencing intermittent suicidal ideation since October, 2023 (when his ex-GF initially broke up with him) and worsening suicidal thoughts for the past 3 months, along with a possible suicide plan to shoot himself with his shotgun. Gerry reports experiencing prior depressive episodes, but not manic symptoms, in the past. No hallucinations or paranoia were endorsed or noted.       Gerry does endorse that he worries a little more than the average person about stressful things in his life, but denies worrying excessively about everyday activities. He also denies any symptoms of PTSD.        Gerry did state that \"I'm not disclosing where it (shotgun) is, it's not in any place illegal. You'd call the police and then they would take it.\"           Per EPAT Assessment of 7-:  Patient is a " 19 year old male, with a history of MDD, brought in by commanding officer for suicidal ideation. ED provider note, nursing notes, [place] suicide risk scale and community records reviewed, patient reportedly made suicidal statement through text to ex girlfriend’s grandfather. He therefore contacted the [facility] regarding his message and informed the leadership that patient is having thoughts of hurting himself. The commanding officer and another officer sat down and talked to patient, he admitted to active SI with a plan to use a shotgun, they brought him to the ED for psych eval. Triage indicates high risk, negative BAL and UDS. Patient is not currently linked with any outpatient services or on psych meds. He used to see psychiatrist and therapist in middle school and high school, had one IP admission in freshman year for SI. No hx of SA or NSSIB. Patient has been in  for 3 years and 2 months now.     Prior to assessment, patient is calm and cooperative, comply with all lab works. Upon assessment, he presents as anxious with dysphoric affect. Patient endorses low mood, difficulty controlling worries, excessive worries, anhedonia, fatigue, helplessness, hopelessness, binge eating on sweets, persistent thoughts of death, irritability and impulsivity. He denies homicidal ideation, visual/auditory hallucinations or delusional thinking. Patient reports he has been experiencing worsening depression and suicidal thoughts for the past several months due to finance, schooling, family issues, relationship and friendship. He seems to be minimizing his suicidality, reports vague SI. When being asked about his plans, he states “yeah I've been thinking about it, pretty often actually”, unable/unwilling to share. He denies having argument with ex gf's grandfather, stating they broke up 10/2023, and stopped contact long time ago. Patient does not seem internally stimulated or under acute distress, however he  is unable to safety plan with future orientation, coping mechanisms, social or outpatient support.     Spoke with commanding officer Stephen, he reports great concerns for patient. He states he has known patient for the past 1.5 years, “he has always been a quiet, kept to himself and self proclaimed person, however at times heard about him being emotional, interjecting into groups and not engaging with peers”. He states patient has shared some issues with ex-girlfriend and one female friend in [place], as well as disagreements with family. Stephen reports he sat down with patient and another officer this afternoon after getting a notice from his supervisor, patient admitted to having plan to shoot himself or use a shotgun, “he said he wouldn't do it in  but don't what will happen when he goes home to [place]”. He reports concerns for his increasing depression and lack of supportive in his life, “I'm afraid when he goes home at the end of the summer, he would do something to himself with the triggers of family and female friend”. Stephen states he works Mon-Fri, his supervisor is still deciding on his order regarding his current mental health, he would like to be reached by psych unit with discharge planning.            PSYCHIATRIC REVIEW OF SYMPTOMS  Depressive Symptoms: depressed or irritable mood, diminished interest, worthlessness or guilt, poor concentration or indecisiveness, suicidal ideation or plan, and guns or weapons in household  Manic Symptoms: negative  Anxiety Symptoms:  Endorses some anxiety  Psychotic Symptoms:  None  Delirium/Altered Mental Status Symptoms:  None  Other Symptoms/Concerns:  None      Past Medical History  History reviewed. No pertinent past medical history.     Code Status: Personally discussed with patient on admission, and is currently a full code.        Past Psychiatric History: 1) Past Dx: DMDD.                                            2) One prior psychiatric  hospitalization: 13 years-old for depression and suicidal ideation for one week ([facility]                                            3) Denies any prior suicide attempts                                            4) Owns a shot gun and refuses to disclose location.                                            5) No prior SIB                                            6) No prior rehab treatment programs                                            7) Outpt MH Tx: None since 12/2022 - high school  therapist)                                            8) Current psych meds: None    Past Psychiatric Meds: 1) Risperidone                                         2) Fluoxetine                                         3) Adderall          Family History: 1) Mother - anxiety                             2) MGM - depression                             3) No known suicides in the family.      Substance Use History: 1) Tobacco - Denies                                          2) ETOH - Denies                                          3) Cannabis - Denies                                          4) Denies any illicit drug use.      Social History  Social History     Socioeconomic History    Marital status: Single     Spouse name: Not on file    Number of children: Not on file    Years of education: Not on file    Highest education level: Not on file   Occupational History    Not on file   Tobacco Use    Smoking status: Never    Smokeless tobacco: Never   Substance and Sexual Activity    Alcohol use: Never    Drug use: Never    Sexual activity: Not on file   Other Topics Concern    Not on file   Social History Narrative    Not on file     Social Determinants of Health     Financial Resource Strain: Low Risk  (7/20/2024)    Overall Financial Resource Strain (CARDIA)     Difficulty of Paying Living Expenses: Not hard at all   Food Insecurity: No Food Insecurity (7/20/2024)    Hunger Vital Sign     Worried About Running Out of Food in  the Last Year: Never true     Ran Out of Food in the Last Year: Never true   Transportation Needs: No Transportation Needs (7/20/2024)    PRAPARE - Transportation     Lack of Transportation (Medical): No     Lack of Transportation (Non-Medical): No   Physical Activity: Patient Unable To Answer (7/20/2024)    Exercise Vital Sign     Days of Exercise per Week: Patient unable to answer     Minutes of Exercise per Session: Patient unable to answer   Stress: Stress Concern Present (7/20/2024)    [employer] - Occupational Stress Questionnaire     Feeling of Stress : Rather much   Social Connections: Moderately Isolated (7/20/2024)    Social Connection and Isolation Panel [NHANES]     Frequency of Communication with Friends and Family: Once a week     Frequency of Social Gatherings with Friends and Family: Never     Attends Orthodox Services: More than 4 times per year     Active Member of Clubs or Organizations: Yes     Attends Club or Organization Meetings: More than 4 times per year     Marital Status: Never    Intimate Partner Violence: Not At Risk (7/20/2024)    Humiliation, Afraid, Rape, and Kick questionnaire     Fear of Current or Ex-Partner: No     Emotionally Abused: No     Physically Abused: No     Sexually Abused: No   Housing Stability: Low Risk  (7/20/2024)    Housing Stability Vital Sign     Unable to Pay for Housing in the Last Year: No     Number of Times Moved in the Last Year: 1     Homeless in the Last Year: No        Other Social History:  The patient graduated high school, and completed a semester of on-line college. He is with the [employer] (part-time). Unemployed x 3 months (worked a week full time about 4 weeks ago). Last worked at [employer]. His work history includes working at [employer] and [place] (). Never . No children. No significant legal history. The patient lives with his mother and 15 year-old brother in his mom's  "[place] in [place].          Trauma History  Victim, Perpetrator or Witness of Abuse: No significant physical, sexual, emotional abuse, neglect or trauma history was identified on initial assessment of the patient. This shall not be an active focus of treatment, but will continue to be reassessed throughout admission. (3)    Physical Abuse: No  Sexual Abuse: No  Verbal / Emotional Abuse / Bullying (+Cyber): No   Financial Abuse: No  Domestic Violence: No      Review of Systems   Review of Systems   Constitutional: Negative.    HENT: Negative.     Eyes: Negative.    Respiratory: Negative.     Cardiovascular: Negative.    Gastrointestinal: Negative.    Endocrine: Negative.    Genitourinary: Negative.    Musculoskeletal: Negative.    Skin: Negative.    Allergic/Immunologic: Negative.    Neurological: Negative.    Hematological: Negative.    Psychiatric/Behavioral:  Positive for suicidal ideas (see HPI).         Cranial Nerve Exam  CN II - normal  CN III - normal  CN IV - normal  CN V - normal  CN VI - normal  CN VII - normal  CN VIII - normal  CN IX - normal  CN X - normal  CN XI - normal  CN XII - normal        Physical Exam  Mental Status Exam:   General: Appropriately groomed and dressed in hospital attire.   Appearance: Appears stated age.   Attitude: Calm, cooperative.   Behavior: Appropriate eye contact.   Motor Activity: No agitation or retardation. No EPS/TD. Normal gait and station. Normal muscle tone and bulk.   Speech: Regular rate, rhythm, volume and tone, spontaneous, fluent. Non-pressured.   Mood: \"Anxious\"   Affect: Mildly depressed appearing.   Thought Process: Organized, and goal directed.   Thought Content: Did report suicidal thoughts with a possible suicide plan to shoot himself.  Does not endorse homicidal ideation.  No overt delusions or paranoia elicited.    Thought Perception: No auditory hallucinations, visual hallucinations, tactile hallucinations, olfactory hallucinations, or gustatory " hallucinations were elicited or noted.   He does not appear to be responding to hallucinatory stimuli.   Cognition: Alert, oriented x 3. No deficits noted. Adequate fund of knowledge. No deficit in recent and remote memory. No deficits in attention, concentration or language.   Insight: Poor, as patient only recognizes some symptoms of  illness and need for recommended treatments.    Judgment: Poor, as patient can not make reasonable decisions about ordinary activities of daily living and necessary medical care recommendations.       Functional Estimates  Estimate of Intelligence: average.   Estimate of Capacity for Activities of Daily Living: independent.       Last Recorded Vitals  Visit Vitals  /64 (Patient Position: Sitting)   Pulse 57   Temp 36 °C (96.8 °F)   Resp 16        Relevant Results  Results for orders placed or performed during the hospital encounter of 07/20/24 (from the past 24 hour(s))   Glucose, Fasting   Result Value Ref Range    Glucose, Fasting 79 74 - 99 mg/dL   Lipid Panel   Result Value Ref Range    Cholesterol 170 0 - 199 mg/dL    HDL-Cholesterol 42.8 mg/dL    Cholesterol/HDL Ratio 4.0     LDL Calculated 115 (H) <=109 mg/dL    VLDL 12 0 - 40 mg/dL    Triglycerides 59 0 - 149 mg/dL    Non HDL Cholesterol 127 (H) 0 - 119 mg/dL         Allergies  Allergies   Allergen Reactions    Peanut Itching       Medications  Scheduled medications  nicotine, 1 patch, transdermal, Daily   Followed by  [START ON 8/31/2024] nicotine, 1 patch, transdermal, Daily   Followed by  [START ON 9/14/2024] nicotine, 1 patch, transdermal, Daily      Continuous medications     PRN medications  PRN medications: alum-mag hydroxide-simeth, diphenhydrAMINE **OR** diphenhydrAMINE, haloperidol **OR** haloperidol lactate, hydrOXYzine pamoate, ibuprofen, LORazepam **OR** LORazepam, melatonin, nicotine polacrilex, psyllium      OARRS Report reviewed on 7- (score = 000).        PSYCHIATRIC RISK ASSESSMENT  Violence  Risk Assessment: acces to weapons, major mental illness, male,  history or weapons training, and presence of firearms in home  Acute Risk of Harm to Others is Considered: low   Suicide Risk Assessment: current psychiatric illness, feelings of hopelessness, living alone or lack of social support, male, presence of firearms in home, suicidal ideations, suicidal plans, and unmarried  Protective Factors against Suicide: employment and Sabianist affiliation/spirituality  Acute Risk of Harm to Self is Considered: high        Diagnostic Impression/Plan:  1) Major Depressive Disorder, recurrent, severe without psychosis       Plan: 1) Patient not wishing nor consenting to a trial of an antidepressant at this time.                2) Group and milieu therapy    2) Other Specified Anxiety Disorder       Plan: 1) see above            TRINI Briceno MD

## 2024-07-21 NOTE — SIGNIFICANT EVENT
07/21/24 1511   Discharge Planning   Living Arrangements Parent   Support Systems Friends/neighbors   Type of Residence Private residence   Who is requesting discharge planning? Patient   Home or Post Acute Services Community services   Expected Discharge Disposition Home   Does the patient need discharge transport arranged? No   RoundTrip coordination needed? No   Has discharge transport been arranged? No   Financial Resource Strain   How hard is it for you to pay for the very basics like food, housing, medical care, and heating? Not very   Housing Stability   In the last 12 months, was there a time when you were not able to pay the mortgage or rent on time? N   At any time in the past 12 months, were you homeless or living in a shelter (including now)? N   Transportation Needs   In the past 12 months, has lack of transportation kept you from medical appointments or from getting medications? no   In the past 12 months, has lack of transportation kept you from meetings, work, or from getting things needed for daily living? No     Met with pt today to complete psychosocial assessment. Plan is for pt to return to the  base upon dc.

## 2024-07-21 NOTE — PROGRESS NOTES
Occupational Therapy    Assessment & Treatment    Patient Name: Gerry Dhaliwal  MRN: [id number]  Today's Date: 7/21/2024        Initial Assessment Attempt  Comments: OT orders received. Chart reviewed. Nursing cleared pt to proceed with evaluation and establish OT POC. Pt observed lying in bed of assigned room covered with blanket. He appeared to be sleeping. Pt did not respond to name or movement in the room.  Nursing updated. OT eval not completed at this time.

## 2024-07-21 NOTE — NURSING NOTE
"RN met with patient in dining area early this morning. Rated anxiety at 5 of 10. Denied depression. Denied thoughts of self-harm or SI, \"Not right now.\" Agreed to contract for safety. Denied AH or VH. Endorsed sleep as \"okay.\" Endorsed mood as \"Good. Don't want to be here\" (brief laugh). Denied pain. Endorsed last BM as 07/19.   Coping skills: \"Deep breathing.\"  Strengths: \"Selfless.\"  Goals: \"Get out of here\" (brief laugh).     Patient inquired several times regarding how soon he will meet with unit psychiatrist and SW.     Patient isolative, coming out of room only for meals and one snack period. Declined invitation to join groups/unit activities.     Q15 minutes safety checks maintained. Will continue to monitor.   "

## 2024-07-21 NOTE — CARE PLAN
"The patient's goals for the shift include \"No goal\"    The clinical goals for the shift include Medication compliance and Safety    Over the shift, the patient did not make progress toward the following goals. Barriers to progression include acuity of illness. Recommendations to address these barriers include medication compliance, safety, and education.    "

## 2024-07-21 NOTE — CARE PLAN
Problem: Sensory Perceptual Alteration as Evidenced by  Goal: Cooperates with admission process  Outcome: Progressing  Goal: Patient/Family participate in treatment and discharge plans  Outcome: Progressing  Goal: Patient/Family verbalizes awareness of resources  Outcome: Progressing  Goal: Participates in unit activities  Outcome: Progressing  Goal: Discusses signs/symptoms of illness/treatment options  Outcome: Progressing  Goal: Initiates reality-based interactions  Outcome: Progressing  Goal: Able to discuss content of hallucinations/delusions  Outcome: Progressing  Goal: Notifies staff when experiencing hallucinations/delusions  Outcome: Progressing  Goal: Verbalizes reduction in hallucinations/delusions  Outcome: Progressing  Goal: Will not act on psychotic perception  Outcome: Progressing  Goal: Understands least restrictive measures  Outcome: Progressing  Goal: Free from restraint events  Outcome: Progressing     Problem: Risk for Suicide  Goal: Accepts medications as prescribed/needed this shift  Outcome: Progressing  Goal: Identifies supports this shift  Outcome: Progressing  Goal: Makes needs known through verbalization or behaviors this shift  Outcome: Progressing  Goal: No self harm this shift  Outcome: Progressing  Goal: Read Safety Guidelines this shift  Outcome: Progressing  Goal: Complete Mental Health Safety Plan (psychiatry only) this shift  Outcome: Progressing     Problem: Discharge Planning - Care Management  Goal: Discharge to post-acute care or home with appropriate resources  Outcome: Progressing     Problem: Community resource needs  Goal: Patient is receiving increased resource support to enhance ability to remain at home  Outcome: Progressing     Problem: Mental health issues  Goal: Stabilize adverse mental health factors affecting plan of care  Outcome: Progressing     Problem: Access to Care Issue  Goal: Assess and Address Access Barriers  Outcome: Progressing     Problem: Assessment  of Caregiver  Goal: Caregiver Demonstrates Personal Health and Wellbeing; as well as Ability to Safely and Effectively Care for Patient  Outcome: Progressing     Problem: Assistance Required for Daily Activities  Goal: Develop a Plan to Assist Patient with Activites of Daily Living  Outcome: Progressing     Problem: Coordination of Community Resources Needed  Goal: Coordination of Services will be Obtained  Outcome: Progressing     Problem: Coordination of Psychosocial Support Services Needed  Goal: Coordination of Services will be Obtained  Outcome: Progressing     Problem: Medication Adherence  Goal: Adherence to Medication Regimen  Outcome: Progressing     Problem: Financial Problem  Goal: Assess and Address Specific Financial Obstacles Affecting Patient's Adderence to Plan of Care  Outcome: Progressing  Goal: STG - Patient will complete simple calculations for time/money management  Outcome: Progressing     Problem: Risk of Exacerbation, or Readmission to Hospital Related to Symptoms of Comorbidities  Goal: Knowledge and Symptom Management of Chronic Disease will be Documented  Outcome: Progressing     Problem: Risk of Uncoordinated Care  Goal: Care will be Coordinated and Supported by a Multidisciplinary Team of Providers  Outcome: Progressing     Problem: Negative Experience, Conflict with, or Distrust of Providers and/or Health System  Goal: Plan to Address Patient Specific Negative Experience, Distrust, or Conflict with Providers and/or Health System  Outcome: Progressing

## 2024-07-21 NOTE — GROUP NOTE
Group Topic: Art Creative   Group Date: 7/21/2024  Start Time: 1400  End Time: 1520  Facilitators: SAÚL FloresS   Department: [facility]    Number of Participants: 9   Group Focus: leisure skills and relaxation  Treatment Modality: Recreation Therapy  Interventions utilized were: Decoupage (heart box, planter box), Sticker Puzzles, Music, leisure development and support  Purpose: Leisure Awareness, Creative Stimulation, Relaxation, coping skills    Name: Gerry Dhaliwal YOB: 2005   MR: [id number]      Facilitator: Recreational Therapist  Level of Participation: did not attend  Progress: None  Comments: Session included creative activities which were offered for 80 minutes.  Participants were offered two options to work on. They were explained each and then encouraged to make a choice and be creative. Relaxing music was played and social stimulation was provided throughout the session.     Patient remained in their room throughout the session and refused the activity.     Plan: continue with services

## 2024-07-21 NOTE — GROUP NOTE
Group Topic: Coping Skills   Group Date: 7/21/2024  Start Time: 0930  End Time: 1015  Facilitators: DARYL Flores   Department: [facility]    Number of Participants: 6   Group Focus: goals, mindfulness, and relaxation  Treatment Modality: Recreation Therapy  Interventions utilized were: Goal Discussion, Meditation for Beginners Handout, 20 minute Guided Imagery Meditation (video and audio), patient education and support  Purpose: Goal Discussion, Relaxation Techniques, coping skills    Name: Gerry Dhaliwal YOB: 2005   MR: [id number]      Facilitator: Recreational Therapist  Level of Participation: did not attend, left dining area at the start of the session  Progress: None  Comments: This session involved discussing relaxation techniques and providing education on beginning a meditation practice.  Participants were provided examples and practices a 20 minute guided imagery meditation. During this session we also discussed personal goals related to managing one's mental health.      Patient remained in their room throughout the session for unknown reasons.     Plan: continue with services

## 2024-07-22 PROCEDURE — 1240000001 HC SEMI-PRIVATE BH ROOM DAILY

## 2024-07-22 PROCEDURE — 99233 SBSQ HOSP IP/OBS HIGH 50: CPT | Performed by: PSYCHIATRY & NEUROLOGY

## 2024-07-22 PROCEDURE — 97165 OT EVAL LOW COMPLEX 30 MIN: CPT | Mod: GO

## 2024-07-22 PROCEDURE — 2500000001 HC RX 250 WO HCPCS SELF ADMINISTERED DRUGS (ALT 637 FOR MEDICARE OP): Performed by: PSYCHIATRY & NEUROLOGY

## 2024-07-22 PROCEDURE — 97150 GROUP THERAPEUTIC PROCEDURES: CPT | Mod: GO,CO

## 2024-07-22 RX ADMIN — Medication 5 MG: at 20:46

## 2024-07-22 ASSESSMENT — COLUMBIA-SUICIDE SEVERITY RATING SCALE - C-SSRS
1. SINCE LAST CONTACT, HAVE YOU WISHED YOU WERE DEAD OR WISHED YOU COULD GO TO SLEEP AND NOT WAKE UP?: NO
6. HAVE YOU EVER DONE ANYTHING, STARTED TO DO ANYTHING, OR PREPARED TO DO ANYTHING TO END YOUR LIFE?: NO
2. HAVE YOU ACTUALLY HAD ANY THOUGHTS OF KILLING YOURSELF?: NO

## 2024-07-22 ASSESSMENT — PAIN - FUNCTIONAL ASSESSMENT
PAIN_FUNCTIONAL_ASSESSMENT: 0-10
PAIN_FUNCTIONAL_ASSESSMENT: 0-10

## 2024-07-22 ASSESSMENT — PAIN SCALES - GENERAL
PAINLEVEL_OUTOF10: 0 - NO PAIN
PAINLEVEL_OUTOF10: 0 - NO PAIN

## 2024-07-22 NOTE — NURSING NOTE
"Patient out on the unit and social with peers this shift. Rated anxiety 0/10 \"not really\" and depression 0/10. Denied SI/HI. Denied auditory/visual/other hallucinations. No complaints of pain or discomfort. Patient has attended group therapy this shift. No scheduled medications this shift. No PRN's given this shift. Able to state positive coping skills such as \"play online games with my friends\". Patient has been cooperative with staff. Q 15 minute checks to be maintained throughout shift for safety.    "

## 2024-07-22 NOTE — PROGRESS NOTES
Occupational Therapy     REHAB Therapy Assessment & Treatment    Patient Name: Gerry Dhaliwal  MRN: [id number]  Today's Date: 7/22/2024      Activity Assessment:  Initial Assessment  Additional Comments: Therapy evaluation completed while seated in hallway.    Occupational Therapy Initial Evaluation- [facility]   Time In: 0930 Time Out: 0938  Date of OT Referral: 7/20/2024   Admission Diagnosis: Depression with SI   Reason for Referral: Pt. admitted for making suicidal statement, and was brought in by commanding officer. Pt. referred to OT for safety assessment.  General Information   Past Medical History/History of Present Illness: MDD, depression with SI   Pain: 0/10   Precautions: Low Fall Risk   Appearance: Pt. dressed in hospital clothes, appropriate appearance   Initial Response to Eval: Pt. cooperative and agreeable to participate in therapy evaluation.   Current Stressors: Finance, schooling, relationship stress  Personal History   Marital Status: Single   Community Support: Pt. reports to not currently participating in any community programs. Per EMR report, pt. used to see a psychiatrist and therapist in middle and high school.   Support System: Pt. reports that [place] peers on  [place] provide good support for him. He also reports that he has a friend from home that is a good support system. Pt. reports that the [employer] community provides a good support system, as well.   Living Situation: Pt. reports that [place] He reports that once training is completed he will be returning home.   Prior Level of Function: Independent   Level of Education: Per EMR report, pt. completed high school and was taking some college courses. Report discusses how schooling was not going well for pt.   Employment Status: Pt. 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 He reports that he is waiting to obtain a job.   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 with minimal responses during evaluation.     Basic Functional Mobility   Device Used: None   Bed Mobility: Independent   Transfer Status: Independent   Ambulatory Status: Independent   Balance: WNL   Endurance: WNL  Activities of Daily Living   Grooming/Hygiene: Independent   Dressing: Independent   Bathing: Independent   Toileting: Independent   Sleep: Per EMR report, pt. with increased sleep as of recently  Instrumental Activities of Daily Living   Driving/ transportation: Pt. reports to having a drivers license and a means for transportation.   Medication Management/Compliance: No current medication usage   Managing Finances: Pt. 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   Patient Reported Daily Routine/Responsibility: Pt. not able to identify a stable routine.  Emotional/Mental Health Management   Patient Identified Coping Skills- Positive: Online joshua with "[employer]", reading the Bible     Knowledge of Illness/Emotions: Per EMR report, pt. \"minimizing\" his SI.   Stress Management: Poor, pt. with current financial and relationship stress.   Depression/Anxiety Management: Per EMR report, pt. with worsening depressive and SI thoughts for the past several months.   Patient Identified Strengths: None identified   Patient Identified Weaknesses: None identified       Pt agreeable to OT POC for attendance of 5x/week group sessions and/ or 1:1 sessions to address the goals established " above prior to discharge.    Encounter Problems       Encounter Problems (Active)       OT Goals       Community Resources (Progressing)       Start:  07/22/24    Expected End:  08/19/24       Pt. will ID 2-3 community resources/programs to join/attend after discharge to improve their support system.           Coping Skills (Progressing)       Start:  07/22/24    Expected End:  08/19/24       Pt. will identify 2-3 techniques to manage symptoms of diagnosis for increased daily function.           Routine (Progressing)       Start:  07/22/24    Expected End:  08/19/24       Pt. will identify 2-3 healthy habits to encourage a productive ADL routine.           Stress Management (Progressing)       Start:  07/22/24    Expected End:  08/19/24       Pt. will identify 2-3 stress management techniques to increase daily function with ADL routine.           Goals (Progressing)       Start:  07/22/24    Expected End:  08/19/24       Pt. will ID 2-3 short term goals and 1-2 long term goals, including methods to achieve goals after discharge.

## 2024-07-22 NOTE — PROGRESS NOTES
"Gerry Dhaliwal is a 19 y.o. year old male patient who is on [facility] admission day 2.      Subjective   Gerry Dhaliwal is a 19 y.o. year old male patient who was personally seen and interviewed, and discussed in morning team rounds. The patient was interviewed alone in his room (interrupted from an afternoon unit group and interviewed sitting on the side of his bed), and was easily engaged and cooperative. This afternoon, Gerry reports feeling \"alright\" and currently rates his depression at a 0 out of 10. No suicidal ideation or suicide plans were endorsed. He also rates his anxiety at a 3-4 out of 10. No hallucinations or paranoia were endorsed or noted.   Gerry slept 10 hours last night (unbroken).           Objective   Mental Status Exam:   General: Appropriately groomed and dressed in hospital attire.   Appearance: Appears stated age.   Attitude: Calm, cooperative.   Behavior: Poor eye contact.   Motor Activity: No agitation or retardation. No EPS/TD. Normal gait and station. Normal muscle tone and bulk.   Speech: Regular rate, rhythm, volume and tone, spontaneous, fluent. Non-pressured.   Mood: \"Alright\"   Affect: Withdrawn.   Thought Process: Organized, and goal directed.   Thought Content: Does not currently endorse having any suicidal thoughts or suicide plans.  Does not endorse homicidal ideation.  No overt delusions or paranoia elicited.    Thought Perception: No auditory hallucinations, visual hallucinations, tactile hallucinations, olfactory hallucinations, or gustatory hallucinations were elicited or noted.   He does not appear to be responding to hallucinatory stimuli.   Cognition: Alert, oriented x 3. No deficits noted. Adequate fund of knowledge. No deficit in recent and remote memory. No deficits in attention, concentration or language.   Insight: Poor, as patient only recognizes some symptoms of  illness and need for recommended treatments.    Judgment: Poor-to-Fair, as patient can possibly make " reasonable decisions about ordinary activities of daily living and necessary medical care recommendations.             LABS:  Results for orders placed or performed during the hospital encounter of 07/20/24 (from the past 96 hour(s))   Glucose, Fasting   Result Value Ref Range    Glucose, Fasting 79 74 - 99 mg/dL   Lipid Panel   Result Value Ref Range    Cholesterol 170 0 - 199 mg/dL    HDL-Cholesterol 42.8 mg/dL    Cholesterol/HDL Ratio 4.0     LDL Calculated 115 (H) <=109 mg/dL    VLDL 12 0 - 40 mg/dL    Triglycerides 59 0 - 149 mg/dL    Non HDL Cholesterol 127 (H) 0 - 119 mg/dL   Vitamin D 25-Hydroxy,Total (for eval of Vitamin D levels)   Result Value Ref Range    Vitamin D, 25-Hydroxy, Total 27 (L) 30 - 100 ng/mL        Last Recorded Vitals  Visit Vitals  /65 (BP Location: Right arm, Patient Position: Sitting)   Pulse 70   Temp 36 °C (96.8 °F) (Temporal)   Resp 16        Intake/Output last 3 Shifts:  No intake/output data recorded.    Relevant Results  Scheduled medications  nicotine, 1 patch, transdermal, Daily   Followed by  [START ON 8/31/2024] nicotine, 1 patch, transdermal, Daily   Followed by  [START ON 9/14/2024] nicotine, 1 patch, transdermal, Daily      Continuous medications     PRN medications  PRN medications: alum-mag hydroxide-simeth, diphenhydrAMINE **OR** diphenhydrAMINE, haloperidol **OR** haloperidol lactate, hydrOXYzine pamoate, ibuprofen, LORazepam **OR** LORazepam, melatonin, nicotine polacrilex, psyllium               Assessment/Plan   1) Major Depressive Disorder, recurrent, severe without psychosis       Plan: 1) Patient still not wishing to consent to a trial of an antidepressant at this time.                2) Group and milieu therapy     2) Other Specified Anxiety Disorder       Plan: 1) see above          Joseph Briceno MD

## 2024-07-22 NOTE — GROUP NOTE
"Group Topic: Leisure Skills   Group Date: 7/22/2024  Start Time: 1400  End Time: 1445  Facilitators: SAÚL PatelS   Department: [facility] REHAB THERAPY VIRTUAL    Number of Participants: 7   Group Focus: leisure skills, problem solving, and social skills  Treatment Modality: Recreational Therapy   Interventions utilized were Confident?, group exercise and leisure development  Purpose: other: leisure awareness, social engagement, healthy competition     Name: Gerry Dhaliwal YOB: 2005   MR: [id number]      Facilitator: Recreational Therapist  Level of Participation: active  Quality of Participation: appropriate/pleasant, cooperative, and engaged  Interactions with others: appropriate and offered helpful suggestions  Mood/Affect: appropriate and positive  Triggers (if applicable): n/a  Cognition: coherent/clear and goal directed  Progress: Moderate  Comments: Patients were gathered to learn and participate in the game \"Confident?\". This activity works on cognitive skills, following directions, positive social interaction/teamwork, and promotes leisure awareness.     Pt was fully engaged in group task with peers, taking on a leadership role when placed on team. Pt interacted pleasantly with peers/staff throughout and appeared to be enjoying leisure activity.     Plan: continue with services      "

## 2024-07-22 NOTE — GROUP NOTE
Group Topic: Goals   Group Date: 7/22/2024  Start Time: 0730  End Time: 0800  Facilitators: DARYL Patel   Department: [facility] REHAB THERAPY VIRTUAL    Number of Participants: 3   Group Focus: goals, personal responsibility, and social skills  Treatment Modality: Recreational Therapy   Interventions utilized were SMART Goals, exploration, patient education, and support  Purpose: other: goal setting, healthy lifestyle changes,  social engagement    Name: Gerry Dhaliwal YOB: 2005   MR: [id number]      Facilitator: Recreational Therapist  Level of Participation: did not attend  Progress: None  Comments: Patients were provided with a SMART Goals worksheet for morning goal session (specific, measurable, attainable, relevant, and timely). We discussed setting goals and challenging ourselves to make healthy lifestyle changes.     Patient declined invitation to group activity at this time. Patient will continue to be provided with opportunities to enhance leisure skills and/or coping mechanisms.    Plan: continue with services

## 2024-07-22 NOTE — GROUP NOTE
Group Topic: Dialectical Behavioral Therapy - Emotional Regulation   Group Date: 7/22/2024  Start Time: 1600  End Time: 1650  Facilitators: DARYL Patel   Department: [facility] REHAB THERAPY VIRTUAL    Number of Participants: 8   Group Focus: coping skills and personal responsibility  Treatment Modality: Recreational Therapy   Interventions utilized were Emotion Regulation Skills - (PLEASE, Positive Events, Opposite Action, Check the Facts), patient education, story telling, and support  Purpose: coping skills, insight or knowledge, and self-care    Name: Gerry Dhaliwal YOB: 2005   MR: [id number]      Facilitator: Recreational Therapist  Level of Participation: minimal  Quality of Participation: cooperative, passive, and quiet  Interactions with others:  none  Mood/Affect: closed / guarded  Triggers (if applicable): n/a  Cognition: coherent/clear  Progress: Minimal  Comments: Patients were provided with an Emotion Regulation handout which includes four skill areas (P.L.E.A.S.E, paying attention to positive events, opposite action, and check the facts). We discussed the concepts in each area and patients were asked to create goals or reflect on their personal status.     Pt was quiet/passive during session. He attended for a short period of time, appearing to be listening contently, but left early for unknown reasons and did not return.     Plan: continue with services

## 2024-07-22 NOTE — NURSING NOTE
"Patient assessed while in his room laying on his bed.  Had attempted to assess him earlier but patient did not respond to verbal stimuli.  On second attempt, patient was awake and minimally participated in the assessment.  Stated he just wants to get home.  Educated patient on the need to participate in the programming/treatment planning but he said the was \"dumb\", he doesn't want to be here.  Patient said he has anxiety, 4/10, because he is here.  Denies any depression, suicidal ideations, and hallucinations.  States a coping skill of deep breathing, a strength of being creative, and his goal is to go home.  Patient was encouraged to come out of his room and participate in the unit activities.  Patient did not agree that it is necessary for his discharge.  No PRN medications were administered.  Will continue to monitor.      7/22/24  0540  Patient able to sleep well through the night.  No PRN medications required or requested.  No changes from the previous assessment.    "

## 2024-07-22 NOTE — GROUP NOTE
Group Topic: Music Therapy   Group Date: 7/22/2024  Start Time: 0935  End Time: 1025  Facilitators: Juliana Burnette   Department: [facility] EXPRESSIVE THER VIRTUAL    Number of Participants: 6   Group Focus: fine/gross motor, music therapy, and opportunity for choice/control  Treatment Modality: Music Therapy  Interventions Utilized were: group exercise, other live music listening and singing, and sharing/discussion    Pt.'s engaged in a variety of chair exercises and had the opportunity to select and share some of their preferred music with the group while doing so. Pt.'s then were presented with song lyrics and encouraged to sing along or think about the lyrics/music and share with the group afterwards.    Name: Gerry Dhaliwal YOB: 2005   MR: [id number]      Level of Participation: minimal  Quality of Participation: cooperative, quiet, and withdrawn  Interactions with others: appropriate  Mood/Affect: flat  Cognition, Pre Treatment: attentive  Cognition, Post Treatment: attentive  Progress: Minimal  Plan: continue with services    Pt. attended group and participated in all group exercises. He declined sharing a favorite song or artist at that time, nor did he participate in group singing. He did accept ainsley sheets and appeared to follow along, but he did not participate in group discussion.

## 2024-07-22 NOTE — CARE PLAN
"The patient's goals for the shift include \"Go home\"    The clinical goals for the shift include Medication compliance and Safety    Over the shift, the patient did not make progress toward the following goals. Barriers to progression include acuity of illness. Recommendations to address these barriers include medication compliance, safety, and education.    Problem: Sensory Perceptual Alteration as Evidenced by  Goal: Cooperates with admission process  Outcome: Not Progressing  Goal: Patient/Family participate in treatment and discharge plans  Outcome: Not Progressing  Goal: Participates in unit activities  Outcome: Not Progressing     Problem: Risk for Suicide  Goal: Accepts medications as prescribed/needed this shift  Outcome: Not Progressing     "

## 2024-07-22 NOTE — CARE PLAN
"The patient's goals for the shift include \"get out of here\"    The clinical goals for the shift include maintain patient safety    Over the shift, the patient did not make progress toward the following goals. Barriers to progression include acuteness of illness. Recommendations to address these barriers include maintain Q 15 minute rounds for patient safety.    "

## 2024-07-22 NOTE — CARE PLAN
(Noon) JUDY called to obtain collateral from Stephen Marquez ([phone]). He reported that he and another officer were currently at the main entrance speaking with  as they were expecting to be meeting with treatment team today, per discussion with RN on Saturday. JUDY and MD met with Stephen Marquez and his commanding officer, MARYJANE Menchaca (PH: [phone]) to discuss collateral, risk assessment, treatment plan. Both officers have serious concerns for pts safety and mental health. They do not think he is safe to be released back into the community without treatment. They have very high suicide rates in there training program and recognize the signs. 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 He feels isolated at home and is due to return 7/28/24. Provided letter for them to be able to pursue legal recourse to locate pt's firearm prior to discharge. Discussion with provider on need to probate pt to unit in morning. MARYJANE Menchaca would like to be point of contact from this point forward, LATONIA on file. Will continue to follow.

## 2024-07-22 NOTE — PROGRESS NOTES
Occupational Therapy     REHAB Therapy Assessment & Treatment    Patient Name: Gerry Dhaliwal  MRN: [id number]  Today's Date: 7/22/2024      Activity Assessment:     Music Coping Skill Group: [id number]  Cognitive Tasks and Social Skills Group: [id number]    2/2 Groups Attended    Pt present in all groups this date. Pt observed quiet, flat affect, and remained engaged in tasks. During music, pt sat in group with peers however, remained quiet and declined picking a song. Pt continued to be attentive during group tasks however, with no socialization among peers. During cog, pt appeared with increased attention to task and was observed smiling. Pt able to appropriately take on role as tasks  and engage with team members. Pt demos appropriate behavior during tasks and continues to make small improvements towards OT goals.  Pt would benefit from continued OT services in order to improve overall self-esteem, personal confidence and positive supports for safe transition at discharge.          Encounter Problems       Encounter Problems (Active)       OT Goals       Community Resources (Progressing)       Start:  07/22/24    Expected End:  08/19/24       Pt. will ID 2-3 community resources/programs to join/attend after discharge to improve their support system.           Coping Skills (Progressing)       Start:  07/22/24    Expected End:  08/19/24       Pt. will identify 2-3 techniques to manage symptoms of diagnosis for increased daily function.           Routine (Progressing)       Start:  07/22/24    Expected End:  08/19/24       Pt. will identify 2-3 healthy habits to encourage a productive ADL routine.           Stress Management (Progressing)       Start:  07/22/24    Expected End:  08/19/24       Pt. will identify 2-3 stress management techniques to increase daily function with ADL routine.           Goals (Progressing)       Start:  07/22/24    Expected End:  08/19/24       Pt. will ID 2-3 short term goals  and 1-2 long term goals, including methods to achieve goals after discharge.                      Additional Comments:    MIGUEL collaborated with patients nurse and charge nurse throughout the day to provide appropriate support and encouragement to attend groups. Pt up on unit when MIGUEL left last group of the day. All needs met.      Note complete by  Francesca LIVINGSTON/JUAN under the direct supervision of Alesia MIGUEL/FARIDEH RENTERIA.

## 2024-07-23 LAB
ATRIAL RATE: 54 BPM
P AXIS: 65 DEGREES
PR INTERVAL: 148 MS
Q ONSET: 251 MS
QRS COUNT: 8 BEATS
QRS DURATION: 90 MS
QT INTERVAL: 394 MS
QTC CALCULATION(BAZETT): 374 MS
QTC FREDERICIA: 380 MS
R AXIS: 68 DEGREES
T AXIS: 40 DEGREES
T OFFSET: 448 MS
VENTRICULAR RATE: 54 BPM

## 2024-07-23 PROCEDURE — 97530 THERAPEUTIC ACTIVITIES: CPT | Mod: GO,CO

## 2024-07-23 PROCEDURE — 1240000001 HC SEMI-PRIVATE BH ROOM DAILY

## 2024-07-23 PROCEDURE — 97150 GROUP THERAPEUTIC PROCEDURES: CPT | Mod: GO,CO

## 2024-07-23 PROCEDURE — 2500000001 HC RX 250 WO HCPCS SELF ADMINISTERED DRUGS (ALT 637 FOR MEDICARE OP): Performed by: PSYCHIATRY & NEUROLOGY

## 2024-07-23 PROCEDURE — 99233 SBSQ HOSP IP/OBS HIGH 50: CPT | Performed by: PSYCHIATRY & NEUROLOGY

## 2024-07-23 RX ORDER — FLUOXETINE HYDROCHLORIDE 20 MG/1
20 CAPSULE ORAL DAILY
Status: DISCONTINUED | OUTPATIENT
Start: 2024-07-23 | End: 2024-07-25

## 2024-07-23 RX ADMIN — FLUOXETINE 20 MG: 20 CAPSULE ORAL at 13:43

## 2024-07-23 RX ADMIN — Medication 5 MG: at 20:18

## 2024-07-23 ASSESSMENT — PAIN - FUNCTIONAL ASSESSMENT
PAIN_FUNCTIONAL_ASSESSMENT: 0-10
PAIN_FUNCTIONAL_ASSESSMENT: 0-10

## 2024-07-23 ASSESSMENT — PAIN SCALES - GENERAL
PAINLEVEL_OUTOF10: 0 - NO PAIN
PAINLEVEL_OUTOF10: 0 - NO PAIN

## 2024-07-23 NOTE — GROUP NOTE
"Group Topic: Chemical Dependency - Dual Diagnosis   Group Date: 7/23/2024  Start Time: 1615  End Time: 1700  Facilitators: DARYL Patel   Department: [facility] REHAB THERAPY VIRTUAL    Number of Participants: 6   Group Focus: chemical dependency issues, coping skills, dual diagnosis, and relapse prevention  Treatment Modality: Recreational Therapy   Interventions utilized were Urge Surfing, Relapse Prevention, patient education, story telling, and support  Purpose: coping skills, relapse prevention strategies, and trigger / craving management    Name: Gerry Dhaliwal YOB: 2005   MR: [id number]      Facilitator: Recreational Therapist  Level of Participation: active  Quality of Participation: appropriate/pleasant, cooperative, and engaged  Interactions with others: appropriate and offered helpful suggestions  Mood/Affect: appropriate and brightens with interaction  Triggers (if applicable): n/a  Cognition: coherent/clear and goal directed  Progress: Moderate  Comments: Patients were provided with the \"Urge Surfing\" handout which outlines a technique/process for managing substance use, emotional reactions, and unwanted behaviors. The handout includes a step by step process of how to practice urge surfing, managing triggers, and delay/distraction techniques. Patients were given an opportunity to share their thoughts related to their personal recovery process.     Pt was calm, cooperative, and engaged at times during discussion. He shared briefly about his support system (mainly his Jehovah's witness community), which he has found to be very helpful to him.     Plan: continue with services      "

## 2024-07-23 NOTE — PROGRESS NOTES
"Occupational Therapy     REHAB Therapy Assessment & Treatment    Patient Name: Gerry Dhaliwal  MRN: [id number]  Today's Date: 7/23/2024      Activity Assessment:    Expressive Journaling and Self-Awareness Skills Group: [id number]  Creative Art and Self-Expression Skills Group: [id number]    2/2 Groups Attended    Pt present in all groups this date. Pt observed engaged, quiet, and cooperative. During express, pt observed with increased attention to task and writing to prompts in journal. Pt remained quiet however, engaged in group discussion but did not share during group. During creative, pt observed coloring journal and able to ID something he is thankful for expressing \"the lord\" and pt did share journal coloring at end of group. Pt continues to remain with little socialization among peers however, always attentive during groups.    1:1 session [id number] and [id number]    Pt agreeable to 1:1 session with LAMONT and MIAN in hallway away from peers. During, session pt observed attentive (making eye contact), pleasant, and engaged in discussion. Pt expressed \"I don't feel like group settings work for me\" and expressed \"the bible has been helping me more than groups\" without elaboration on why group doesn't help and demo ability to ID a technique to help manage current stressors. Session resumed after code red was dismissed. Pt able to Id supports expressing \"I have a few friends\" and this writer asked if pt reaches out to talk to them about current stressors with pt stating \"yes.\" Pt continues to be insightful with expressing feelings and session was ended early d/t pt having a visitor and pt continues to make progress towards OT goals. Pt would benefit from continued OT services in order to improve overall self-esteem, personal confidence and positive supports for safe transition at discharge.        Encounter Problems       Encounter Problems (Active)       OT Goals       Community Resources (Progressing)       Start:  " 07/22/24    Expected End:  08/19/24       Pt. will ID 2-3 community resources/programs to join/attend after discharge to improve their support system.           Coping Skills (Progressing)       Start:  07/22/24    Expected End:  08/19/24       Pt. will identify 2-3 techniques to manage symptoms of diagnosis for increased daily function.           Routine (Progressing)       Start:  07/22/24    Expected End:  08/19/24       Pt. will identify 2-3 healthy habits to encourage a productive ADL routine.           Stress Management (Progressing)       Start:  07/22/24    Expected End:  08/19/24       Pt. will identify 2-3 stress management techniques to increase daily function with ADL routine.           Goals (Progressing)       Start:  07/22/24    Expected End:  08/19/24       Pt. will ID 2-3 short term goals and 1-2 long term goals, including methods to achieve goals after discharge.                        Additional Comments:  LAMONT collaborated with patients nurse and charge nurse throughout the day to provide appropriate support and encouragement to attend groups. Pt up on unit when MIGUEL left last group of the day. All needs met.      Note complete by  Francesca LIVINGSTON/JUAN under the direct supervision of Alesia MIGUEL/FARIDEH RENTERIA.

## 2024-07-23 NOTE — NURSING NOTE
"Pt was assessed while lying in bed. Pt did not come out of his room all night, Pt was journaling in his bed. Pt was very pleasant and bright when talking to him. Pt denied all anxiety, depression, SI/HI, hallucinations and pain at this time. Pt stated his goal is \"sleep,\" his coping skill is \"read bible,\" and his strength is \"driving tanks.\" Pt was very excited in his description on how the tanks drive and described it for a few minutes. Pt given PRN melatonin for sleep. Pt is currently sleeping in bed without any obvious physical signs or symptoms of distress. No new orders to carry out at this time. Q15 minute safety checks maintained.   "

## 2024-07-23 NOTE — CARE PLAN
"The patient's goals for the shift include \"sending a letter, get numbers out of phone, get a shower\"    The clinical goals for the shift include maintain patient safety    Over the shift, the patient did not make progress toward the following goals. Barriers to progression include acuteness of illness. Recommendations to address these barriers include maintain Q 15 minute rounds for patient safety.    "

## 2024-07-23 NOTE — CARE PLAN
Discussed with interdisciplianry team during rounds today.  Received call from Barb Ha, Psychological Health Coordinator with the [employer]. She emailed this SW their LATONIA for medical records. JUDY also received a call from Chaplain Khadra for the [facility]. SW met with pt and he very willingly completed the LATONIA and gave permission to speak with  and provide him with pt pin so he can visit. Spoke with pt about how he is coping and his perspective on medications. He verbalized concern that he will not remember to take them and potentially go through withdraw from the medication and feel worse. Encouraged him to set a schedule/routine for medications and the potential benefits of adding IOP into treatment plan once he returns to [place]. He is considering this information and was made aware by MD earlier today that if he does not chose to sign in voluntarily and agree to medication then the plan is to probate him before the . He asked if he could have some time to think about it and can SW talk with him more this afternoon. He is also working on a letter that he wants to talk about. Also, received a follow up email from [employer] Marquez as a follow up to yesterday's meeting. He also requested H&P, Psychosocial Assessment for their records in order to initiate their medical administrative process. SW sent him requested records. Will continue to follow.    Later in day the pt signed a voluntary application for admission and agreed to trial Prozac. He continues to refuse to disclose the location of his gun.

## 2024-07-23 NOTE — PROGRESS NOTES
"Gerry Dhaliwal is a 19 y.o. year old male patient who is on [facility] admission day 3.      Subjective   Gerry Dhaliwal is a 19 y.o. year old male patient who was personally seen and interviewed, and discussed in morning team rounds. The patient was interviewed alone at the end of the hallway (interviewed sitting in a chair), and was easily engaged and cooperative. This noon-time, Gerry reports feeling \"pretty good\" and currently rates his depression at a 0 out of 10. No suicidal ideation or suicide plans were elicited. He also rates his anxiety at a 0 out of 10. No hallucinations or paranoia were endorsed or noted.   Gerry slept 8 hours last night (unbroken).           Objective   Mental Status Exam:   General: Appropriately groomed and dressed in hospital attire.   Appearance: Appears stated age.   Attitude: Calm, cooperative.   Behavior: Poor eye contact.   Motor Activity: No agitation or retardation. No EPS/TD. Normal gait and station. Normal muscle tone and bulk.   Speech: Regular rate, rhythm, volume and tone, spontaneous, fluent. Non-pressured.   Mood: \"Pretty good\"   Affect: Neutral.   Thought Process: Organized, and goal directed.   Thought Content: Does not currently endorse having any suicidal thoughts or suicide plans.  Does not endorse homicidal ideation.  No overt delusions or paranoia elicited.    Thought Perception: No auditory hallucinations, visual hallucinations, tactile hallucinations, olfactory hallucinations, or gustatory hallucinations were elicited or noted.   He does not appear to be responding to hallucinatory stimuli.   Cognition: Alert, oriented x 3. No deficits noted. Adequate fund of knowledge. No deficit in recent and remote memory. No deficits in attention, concentration or language.   Insight: Poor-to-Fair, as patient only recognizes some symptoms of  illness and need for recommended treatments.    Judgment: Poor-to-Fair, as patient can possibly make reasonable decisions about ordinary " activities of daily living and necessary medical care recommendations.             LABS:  Results for orders placed or performed during the hospital encounter of 07/20/24 (from the past 96 hour(s))   Glucose, Fasting   Result Value Ref Range    Glucose, Fasting 79 74 - 99 mg/dL   Lipid Panel   Result Value Ref Range    Cholesterol 170 0 - 199 mg/dL    HDL-Cholesterol 42.8 mg/dL    Cholesterol/HDL Ratio 4.0     LDL Calculated 115 (H) <=109 mg/dL    VLDL 12 0 - 40 mg/dL    Triglycerides 59 0 - 149 mg/dL    Non HDL Cholesterol 127 (H) 0 - 119 mg/dL   Vitamin D 25-Hydroxy,Total (for eval of Vitamin D levels)   Result Value Ref Range    Vitamin D, 25-Hydroxy, Total 27 (L) 30 - 100 ng/mL        Last Recorded Vitals  Visit Vitals  /78 (BP Location: Right arm, Patient Position: Sitting)   Pulse 66   Temp 36.4 °C (97.5 °F)   Resp 16        Intake/Output last 3 Shifts:  No intake/output data recorded.    Relevant Results  Scheduled medications  nicotine, 1 patch, transdermal, Daily   Followed by  [START ON 8/31/2024] nicotine, 1 patch, transdermal, Daily   Followed by  [START ON 9/14/2024] nicotine, 1 patch, transdermal, Daily      Continuous medications     PRN medications  PRN medications: alum-mag hydroxide-simeth, diphenhydrAMINE **OR** diphenhydrAMINE, haloperidol **OR** haloperidol lactate, hydrOXYzine pamoate, ibuprofen, LORazepam **OR** LORazepam, melatonin, nicotine polacrilex, psyllium               Assessment/Plan   1) Major Depressive Disorder, recurrent, severe without psychosis       Plan: 1) Patient again not wishing to consent to a trial of an antidepressant.                2) Group and milieu therapy     2) Other Specified Anxiety Disorder       Plan: 1) see above          Joseph Briceno MD

## 2024-07-23 NOTE — NURSING NOTE
"Patient out on the unit and social with peers this shift. Rated anxiety 0/10 and depression 0/10. Denied SI/HI. Denied auditory/visual/other hallucinations. No complaints of pain or discomfort. Patient has attended group therapy this shift. Medication compliant. No PRN's given or requested. Able to state positive coping skills such as \"read the bible, and writing in a journal\". Patient has been friendly and cooperative with staff. Q 15 minute checks to be maintained throughout shift for safety.    "

## 2024-07-23 NOTE — GROUP NOTE
"Group Topic: Spiritual/Devotional/Thought of the Day   Group Date: 7/23/2024  Start Time: 0730  End Time: 0800  Facilitators: DARYL Patel   Department: [facility] REHAB THERAPY VIRTUAL    Number of Participants: 4   Group Focus: goals, personal responsibility, and social skills  Treatment Modality: Recreational Therapy  Interventions utilized were Thought of the Day - \"Stay with the Moment\"/Focus on the Journey, story telling and support  Purpose: other: goal setting, self-worth, social engagement    Name: Gerry Dhaliwal YOB: 2005   MR: [id number]      Facilitator: Recreational Therapist  Level of Participation: did not attend  Progress: None  Comments: Patients were provided with the Thought of the Day reading - “Can I stay with the moment and the activity before me, just for today?” Patients were given an opportunity to share their thoughts and encouraged to create a personal goal based on the reading. Patients were also provided with the \"Focus on the Journey\" goal worksheet which includes on a variety of healthy habit prompts for participants to complete.    Patient declined invitation to group activity at this time. Patient will continue to be provided with opportunities to enhance leisure skills and/or coping mechanisms.    Plan: continue with services      "

## 2024-07-23 NOTE — GROUP NOTE
"Group Topic: Leisure Skills   Group Date: 7/23/2024  Start Time: 1400  End Time: 1500  Facilitators: DARYL Patel   Department: [facility] REHAB THERAPY VIRTUAL    Number of Participants: 6   Group Focus: leisure skills and social skills  Treatment Modality: Recreational Therapy   Interventions utilized were Sreekanth Attack, leisure development and mental fitness  Purpose: other: leisure awareness, cognitive skills/focus, social engagement, healthy competition     Name: Gerry Dhaliwal YOB: 2005   MR: [id number]      Facilitator: Recreational Therapist  Level of Participation: active  Quality of Participation: appropriate/pleasant, cooperative, and engaged  Interactions with others: appropriate, asked thoughtful questions, and offered helpful suggestions  Mood/Affect: appropriate and positive  Triggers (if applicable): n/a  Cognition: coherent/clear and goal directed  Progress: Moderate  Comments: Patients were gathered to learn and participate in the game \"Sreekanth Attack\". This activity works on cognitive skills, following directions, positive social interaction/teamwork, and promotes leisure awareness.     Pt was pleasant, social with peers, and fully engaged in group task.     Plan: continue with services      "

## 2024-07-23 NOTE — CARE PLAN
"The patient's goals for the shift include \"sleep\"    The clinical goals for the shift include maintain safety    Problem: Sensory Perceptual Alteration as Evidenced by  Goal: Cooperates with admission process  7/22/2024 2152 by iCarra Cevallos RN  Outcome: Progressing  7/22/2024 2152 by Ciarra Cevallos RN  Outcome: Progressing  Goal: Patient/Family participate in treatment and discharge plans  7/22/2024 2152 by Ciarra Cevallos RN  Outcome: Progressing  7/22/2024 2152 by Ciarra Cevallos RN  Outcome: Progressing  Goal: Patient/Family verbalizes awareness of resources  7/22/2024 2152 by Ciarra Cevallos RN  Outcome: Progressing  7/22/2024 2152 by Ciarra Cevallos RN  Outcome: Progressing  Goal: Participates in unit activities  7/22/2024 2152 by Ciarra Cevallos RN  Outcome: Progressing  7/22/2024 2152 by Ciarra Cevallos RN  Outcome: Progressing  Goal: Discusses signs/symptoms of illness/treatment options  7/22/2024 2152 by Ciarra Cevallos RN  Outcome: Progressing  7/22/2024 2152 by Ciarra Cevallos RN  Outcome: Progressing  Goal: Initiates reality-based interactions  7/22/2024 2152 by Ciarra Cevallos RN  Outcome: Progressing  7/22/2024 2152 by Ciarra Cevallos RN  Outcome: Progressing  Goal: Able to discuss content of hallucinations/delusions  7/22/2024 2152 by Ciarra Cevallos RN  Outcome: Progressing  7/22/2024 2152 by Ciarra Cevallos RN  Outcome: Progressing  Goal: Notifies staff when experiencing hallucinations/delusions  7/22/2024 2152 by Ciarra Cevallos RN  Outcome: Progressing  7/22/2024 2152 by Ciarra Cevallos RN  Outcome: Progressing  Goal: Verbalizes reduction in hallucinations/delusions  7/22/2024 2152 by Ciarra Cevallos RN  Outcome: Progressing  7/22/2024 2152 by Ciarra Cevallos RN  Outcome: Progressing  Goal: Will not act on psychotic perception  7/22/2024 2152 by Ciarra A Vlach, RN  Outcome: Progressing  7/22/2024 2152 by Ciarra Cevallos RN  Outcome: Progressing  Goal: Understands least " restrictive measures  7/22/2024 2152 by Ciarra Cevallos RN  Outcome: Progressing  7/22/2024 2152 by Ciarra Cevallos RN  Outcome: Progressing  Goal: Free from restraint events  7/22/2024 2152 by Ciarra Cevallos RN  Outcome: Progressing  7/22/2024 2152 by Ciarra Cevallos RN  Outcome: Progressing     Over the shift, the patient did make progress toward the following goals.

## 2024-07-23 NOTE — NURSING NOTE
Pt had an uneventful night, No PRNS needed. Pt is currently sleeping in bed without any obvious signs or symptoms of distress. No new orders to carry out at this time. Q15 minute safety checks maintained.

## 2024-07-24 PROCEDURE — 2500000001 HC RX 250 WO HCPCS SELF ADMINISTERED DRUGS (ALT 637 FOR MEDICARE OP): Performed by: PSYCHIATRY & NEUROLOGY

## 2024-07-24 PROCEDURE — 97150 GROUP THERAPEUTIC PROCEDURES: CPT | Mod: GO,CO

## 2024-07-24 PROCEDURE — 97530 THERAPEUTIC ACTIVITIES: CPT | Mod: GO,CO

## 2024-07-24 PROCEDURE — 1240000001 HC SEMI-PRIVATE BH ROOM DAILY

## 2024-07-24 PROCEDURE — 99233 SBSQ HOSP IP/OBS HIGH 50: CPT | Performed by: PSYCHIATRY & NEUROLOGY

## 2024-07-24 RX ADMIN — Medication 5 MG: at 20:19

## 2024-07-24 RX ADMIN — FLUOXETINE 20 MG: 20 CAPSULE ORAL at 09:36

## 2024-07-24 ASSESSMENT — PAIN SCALES - GENERAL
PAINLEVEL_OUTOF10: 0 - NO PAIN
PAINLEVEL_OUTOF10: 0 - NO PAIN

## 2024-07-24 ASSESSMENT — PAIN - FUNCTIONAL ASSESSMENT: PAIN_FUNCTIONAL_ASSESSMENT: 0-10

## 2024-07-24 NOTE — PROGRESS NOTES
"Occupational Therapy     REHAB Therapy Assessment & Treatment    Patient Name: Gerry Dhaliwal  MRN: [id number]  Today's Date: 7/24/2024      Activity Assessment:       Acknowledgement ID and Letting Go Skills Group: [id number]  Self-Awareness ID; in/not Control of Skills Group: [id number]  Leisure and Social Skills Group: [id number]    3/3 Groups Attended    Pt present in all groups this date. Pt observed quiet (in AM groups), cooperative, and engaged however, remains to share short answers when prompted. During ack, pt prompted to express feelings about song with pt expressing \"I don't know how I feel about the song\" and ID 1 thing to let go of and was observed almost joking with smirk on face stating \"let go of relationships\" without further elaboration. During self, pt did not complete task worksheet but observed engaged in group discussion. During leisure, pt appeared with increased attention to task expressing \"I love this game\" and observed laughing at times and making eye-contact. Pt continues to demo fluctuating attention to tasks depending on group topic.    1:1 session [id number]    Pt agreeable to finish session d/t ending early previous date with LAMONT and MIAN in hallway away from peers. This writer checked in on how he was in AM group d/t sarcastic sense of humor and pt expressed \"when I feel comfortable in settings I like to joke around.\" Pt observed cooperative (G eye contact in beginning of session) and engaged in discussion. 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 Pt expresses \"I was doing therapy and I was telling them a lot but I don't know why it didn't work.\" Pt continues to demo reminiscing on past situations and is validated on " "feelings and is encouraged by this writer to focus on what is in pts control. As the conversation continued, pt with increased anxious behaviors (knee bouncing), less eye contact and appears uncomfortable at times when this writer sharing pt may need to focus on own mental health vs. \"Having hope that my ex and I will get back together, if not I have a hard time going on without her\" Pt continues to make progress towards OT goals as evidenced above. Pt would benefit from continued OT services in order to improve overall self-esteem, personal confidence and positive supports for safe transition at discharge.          Encounter Problems       Encounter Problems (Active)       OT Goals       Community Resources (Progressing)       Start:  07/22/24    Expected End:  08/19/24       Pt. will ID 2-3 community resources/programs to join/attend after discharge to improve their support system.           Coping Skills (Progressing)       Start:  07/22/24    Expected End:  08/19/24       Pt. will identify 2-3 techniques to manage symptoms of diagnosis for increased daily function.           Routine (Progressing)       Start:  07/22/24    Expected End:  08/19/24       Pt. will identify 2-3 healthy habits to encourage a productive ADL routine.           Stress Management (Progressing)       Start:  07/22/24    Expected End:  08/19/24       Pt. will identify 2-3 stress management techniques to increase daily function with ADL routine.           Goals (Progressing)       Start:  07/22/24    Expected End:  08/19/24       Pt. will ID 2-3 short term goals and 1-2 long term goals, including methods to achieve goals after discharge.                            Additional Comments:  MIGUEL collaborated with patients nurse and charge nurse throughout the day to provide appropriate support and encouragement to attend groups. Pt up on unit when MIGUEL left last group of the day. All needs met.      Note complete by  Francesca LIVINGSTON/JUAN under the " direct supervision of Alesia SO, SRIT.

## 2024-07-24 NOTE — CARE PLAN
"The patient's goals for the shift include :\"get sleep\"    The clinical goals for the shift include medication compliance    Over the shift, the patient did not make progress toward the following goals. Barriers to progression include lack of medication compliance . Recommendations to address these barriers include more education on medications.    "

## 2024-07-24 NOTE — GROUP NOTE
Group Topic: Gross Motor/Balance Skills   Group Date: 7/24/2024  Start Time: 1400  End Time: 1500  Facilitators: SAÚL FloresS   Department: [facility] REHAB THERAPY VIRTUAL    Number of Participants: 5   Group Focus: Movement/physical activity, leisure skills  Treatment Modality: Recreation Therapy  Interventions utilized were: [employer] Wii, Music, leisure development  Purpose: Physical leisure, Social stimulation, Pleasurable activity, Patient choice, coping skills    Name: Gerry Dhaliwal YOB: 2005   MR: [id number]      Facilitator: Recreational Therapist  Level of Participation: active  Quality of Participation: appropriate/pleasant, cooperative, and engaged  Interactions with others:  minimal and appropriate  Mood/Affect: appropriate and positive  Triggers (if applicable): N/A  Cognition: coherent/clear and capable  Progress: Moderate  Comments: This session involved participating in multiple movement games via the WiFasti. Patient participants were encouraged to practice coordinating motor movements, socialize with peers, and increase leisure awareness. For those participants that don't utilize technology for leisure they will be assisted with cognitive and movement tasks.      Patient attended the entire session and completed all desired group tasks.  He was independent with the game play. Mr. Dhaliwal shared that he still plays the Wii with family members during the holiday. He reminisced about enjoying these times with loved ones.     Plan: continue with services

## 2024-07-24 NOTE — CARE PLAN
"The patient's goals for the shift include \" finish my letter\"    The clinical goals for the shift include medication compliance    Over the shift, the patient made progress toward the following goals of finishing my letter. Barriers to progression include resistance of care. Recommendations to address these barriers include continue medication as ordered.    "

## 2024-07-24 NOTE — NURSING NOTE
"Patient was assessed this morning in the hallway away from peers for privacy. He was cooperative and engaged, and his  eye contact was good. Patient denies anxiety this morning and rates depression 2/10 with no SI/HI or AVH reported. He has been both medication and group compliant. Patient denies pain or discomfort and his appetite has been good. He reports last BM on 7/24. Patients goal for today was \" finish my letter\". He identifies coping skill as \" deep breathing\". His strengths were identified as \" I'm selfless and a leader\". Will continue to monitor  for safety and encourage group participation.  "

## 2024-07-24 NOTE — GROUP NOTE
Group Topic: Self-Care/Wellness   Group Date: 7/24/2024  Start Time: 1600  End Time: 1700  Facilitators: DARYL Flores   Department: [facility] REHAB THERAPY VIRTUAL    Number of Participants: 6   Group Focus: coping skills, mindfulness, personal responsibility, and self-awareness  Treatment Modality: Recreation Therapy  Interventions utilized were: Living Skills - Life Related Important Skills, exploration, patient education, story telling, and support  Purpose: coping skills, feelings, communication skills, insight or knowledge, self-worth, and self-care    Name: Gerry Dhaliwal YOB: 2005   MR: [id number]      Facilitator: Recreational Therapist  Level of Participation: did not attend  Progress: None  Comments: Life related important skills are part of the programs living skills education. This session focused on three skills and how they may be built or improved with practices. Skill number one included learning from decisions and one's history. Skill number two focused on being present and awareness. The third skill included concepts about planing for the future or building a life worth living. Participants were encouraged to share personal experiences, thoughts, and strategies they are willing to implement.     Patient remained in their room throughout the session resting/sleeping.     Plan: continue with services

## 2024-07-24 NOTE — NURSING NOTE
Patient arrived to unit for C6 Keytruda. Patient stated he has been having nasal congestion due to the weather changes, denies fever, has been using saline nasal spray as needed. Plan of care reviewed, patient agreeable to plan. Patient tolerated treatment well. No sign of reaction noted. VSS. Patient received discharge instructions and follow up appointments. Patient instructed to return 3/5/20 for labs and 3/6/20 for Dr. Cruz and chemo. Verbalized understanding and ambulated unassisted off unit accompanied by his wife. Patient in NAD at time of discharge.     Pt took her night time medications  Pt slept all night long  Pt had an uneventful night

## 2024-07-24 NOTE — CARE PLAN
Discussed with interdisciplianry team during rounds today.  Spoke with Sgt Kendell Menchaca to provide update on treatment plan. Faxed updated clinicals to [facility] ( for coordinating dc and OP services).

## 2024-07-24 NOTE — PROGRESS NOTES
"Gerry Dhaliwal is a 19 y.o. year old male patient who is on U admission day 4.      Subjective   Gerry Dhaliwal is a 19 y.o. year old male patient who was personally seen and interviewed, and discussed in morning team rounds. The patient was interviewed alone at the end of the hallway (interviewed sitting in a chair), and was easily engaged and cooperative. This morning, Gerry reports feeling \"alright\" and currently rates his depression at a 2 out of 10. No suicidal ideation or suicide plans were elicited. He also rates his anxiety at a 0 out of 10. No hallucinations or paranoia were endorsed or noted.   Gerry slept 8.75 hours last night (unbroken).  The patient signed in voluntarily yesterday afternoon.          Objective   Mental Status Exam:   General: Appropriately groomed and dressed in hospital/casual attire.   Appearance: Appears stated age.   Attitude: Calm, cooperative.   Behavior: Fair eye contact.   Motor Activity: No agitation or retardation. No EPS/TD. Normal gait and station. Normal muscle tone and bulk.   Speech: Regular rate, rhythm, volume and tone, spontaneous, fluent. Non-pressured.   Mood: \"Alright\"   Affect: Slightly withdrawn.   Thought Process: Organized, and goal directed.   Thought Content: Does not currently endorse having any suicidal thoughts or suicide plans.  Does not endorse homicidal ideation.  No overt delusions or paranoia elicited.    Thought Perception: No auditory hallucinations, visual hallucinations, tactile hallucinations, olfactory hallucinations, or gustatory hallucinations were elicited or noted.   He does not appear to be responding to hallucinatory stimuli.   Cognition: Alert, oriented x 3. No deficits noted. Adequate fund of knowledge. No deficit in recent and remote memory. No deficits in attention, concentration or language.   Insight: Poor-to-Fair, as patient only recognizes some symptoms of  illness and need for recommended treatments.    Judgment: Poor-to-Fair, as " patient can possibly make reasonable decisions about ordinary activities of daily living and necessary medical care recommendations.             LABS:  Results for orders placed or performed during the hospital encounter of 07/20/24 (from the past 96 hour(s))   Glucose, Fasting   Result Value Ref Range    Glucose, Fasting 79 74 - 99 mg/dL   Lipid Panel   Result Value Ref Range    Cholesterol 170 0 - 199 mg/dL    HDL-Cholesterol 42.8 mg/dL    Cholesterol/HDL Ratio 4.0     LDL Calculated 115 (H) <=109 mg/dL    VLDL 12 0 - 40 mg/dL    Triglycerides 59 0 - 149 mg/dL    Non HDL Cholesterol 127 (H) 0 - 119 mg/dL   Vitamin D 25-Hydroxy,Total (for eval of Vitamin D levels)   Result Value Ref Range    Vitamin D, 25-Hydroxy, Total 27 (L) 30 - 100 ng/mL        Last Recorded Vitals  Visit Vitals  /70 (BP Location: Right arm, Patient Position: Sitting)   Pulse 62   Temp 36.3 °C (97.3 °F)   Resp 16        Intake/Output last 3 Shifts:  No intake/output data recorded.    Relevant Results  Scheduled medications  FLUoxetine, 20 mg, oral, Daily  nicotine, 1 patch, transdermal, Daily   Followed by  [START ON 8/31/2024] nicotine, 1 patch, transdermal, Daily   Followed by  [START ON 9/14/2024] nicotine, 1 patch, transdermal, Daily      Continuous medications     PRN medications  PRN medications: alum-mag hydroxide-simeth, diphenhydrAMINE **OR** diphenhydrAMINE, haloperidol **OR** haloperidol lactate, hydrOXYzine pamoate, ibuprofen, LORazepam **OR** LORazepam, melatonin, nicotine polacrilex, psyllium               Assessment/Plan   1) Major Depressive Disorder, recurrent, severe without psychosis       Plan: *1) trial Fluoxetine 20 mg Qdaily (depression and anxiety)                2) Group and milieu therapy     2) Other Specified Anxiety Disorder       Plan: 1) see above          Joseph Briceno MD

## 2024-07-24 NOTE — NURSING NOTE
"Pt interview in the patients room  Pt is calm and cooperative  Pt stated his day was \"Decent\"  Pt stated his goal \"get sleep\"  strength \" I play video games\" and his coping skill \"I deep breath\"  Pt rated his anxiety 0/10  depression 2-3/10 and denied everything else at this time  Pt is appropriate with his answers to the questions at this time   "

## 2024-07-24 NOTE — GROUP NOTE
"Group Topic: Goals   Group Date: 7/24/2024  Start Time: 0745  End Time: 0805  Facilitators: DARYL Flores   Department: [facility] REHAB THERAPY VIRTUAL    Number of Participants: 3   Group Focus: check in and goals  Treatment Modality: Recreation Therapy  Interventions utilized were: Goal Mapping, support  Purpose: Goal Identification, self-care    Name: Gerry Dhaliwal YOB: 2005   MR: [id number]      Facilitator: Recreational Therapist  Level of Participation: minimal  Quality of Participation: attentive, quiet, and withdrawn  Interactions with others: minimal  Mood/Affect: flat and calm  Triggers (if applicable): N/A  Cognition: coherent/clear and capable  Progress: Minimal  Comments: This session involved working through a goal or desire by brainstorming three additional ideas related to the individual's main goal.  We looked at needs related to the desire, the abilities/characteristics both positive/negative an individual has, and the benefits to change or positive outcome from accomplishing the goal.    Mr. Dhaliwal was reluctant to share any thoughts or opinions. He did identify that he was interested in playing the Wii. Patient was encouraged to think of some abilities that would benefit him with accomplishing his goals. Patient identified that he \"doesn't know what any of them are\". The group brainstormed some examples. Patient then nodded his head after being asked if any of the examples helped him think of something. Patient did not identify any specific goals to work on at this time.     Plan: continue with services      "

## 2024-07-25 PROCEDURE — 97150 GROUP THERAPEUTIC PROCEDURES: CPT | Mod: GO,CO

## 2024-07-25 PROCEDURE — 1240000001 HC SEMI-PRIVATE BH ROOM DAILY

## 2024-07-25 PROCEDURE — 99233 SBSQ HOSP IP/OBS HIGH 50: CPT | Performed by: PSYCHIATRY & NEUROLOGY

## 2024-07-25 PROCEDURE — 2500000001 HC RX 250 WO HCPCS SELF ADMINISTERED DRUGS (ALT 637 FOR MEDICARE OP): Performed by: PSYCHIATRY & NEUROLOGY

## 2024-07-25 RX ADMIN — FLUOXETINE 20 MG: 20 CAPSULE ORAL at 08:25

## 2024-07-25 RX ADMIN — Medication 5 MG: at 21:57

## 2024-07-25 ASSESSMENT — PAIN SCALES - GENERAL
PAINLEVEL_OUTOF10: 0 - NO PAIN
PAINLEVEL_OUTOF10: 0 - NO PAIN

## 2024-07-25 ASSESSMENT — PAIN - FUNCTIONAL ASSESSMENT
PAIN_FUNCTIONAL_ASSESSMENT: 0-10
PAIN_FUNCTIONAL_ASSESSMENT: 0-10

## 2024-07-25 NOTE — NURSING NOTE
"Patient out on the unit and social with peers this shift. Rated anxiety 0/10 and depression 0/10. Denied SI/HI. Denied auditory/visual/other hallucinations. No complaints of pain or discomfort. Patient has attended group therapy this shift. Medication compliant. No PRN's given or requested this shift. Able to state positive coping skills such as \"reading the bible\". Patient has been calm and cooperative with staff. Q 15 minute checks to be maintained throughout shift for safety.    "

## 2024-07-25 NOTE — NURSING NOTE
"Patient interview in room, patient rated anxiety and depression a 0. Denied SI/HI/AH/VH. Patient strengths stated \"selfless\". Coping skills stated \"deep breathing\". Patient goal stated \"get sleep\". Patient stated he attended groups today, med compliant, and cooperative with staff.   "

## 2024-07-25 NOTE — GROUP NOTE
Group Topic: Dialectical Behavioral Therapy - Mindfulness   Group Date: 7/25/2024  Start Time: 1600  End Time: 1700  Facilitators: DARYL Flores   Department: [facility]  REHAB THERAPY VIRTUAL    Number of Participants: 5   Group Focus: coping skills and mindfulness  Treatment Modality: Recreation Therapy  Interventions utilized were: [id number] Skills and Ideas for Practicing Nonjudgementalness, clarification, exploration, and support  Purpose: coping skills, maladaptive thinking, and insight or knowledge    Name: Gerry Dhaliwal YOB: 2005   MR: [id number]      Facilitator: Recreational Therapist  Level of Participation: did not attend  Progress: None  Comments: The HOW skills related to mindfulness development were discussed.  We focused on decreasing judgmental ideas, staying one-mindfully, and effectiveness.  Additional time was spent on working at improving non-judgmentalness and staying one mindfully.     Patient remained in their room throughout the session resting/sleeping.     Plan: continue with services

## 2024-07-25 NOTE — PROGRESS NOTES
"Gerry Dhaliwal is a 19 y.o. year old male patient who is on U admission day 5.      Subjective   Gerry Dhaliwal is a 19 y.o. year old male patient who was personally seen and interviewed, and discussed in morning team rounds. The patient was interviewed alone at the end of the hallway (interviewed sitting in a chair), and was easily engaged and cooperative. This late morning, Gerry reports feeling \"pretty good\" and currently rates his depression at a 0 out of 10. No suicidal ideation or suicide plans were elicited. He also rates his anxiety at a 0 out of 10. No hallucinations or paranoia were endorsed or noted.   Gerry slept 7.5 hours last night (broken).          Objective   Mental Status Exam:   General: Appropriately groomed and dressed in hospital/casual attire.   Appearance: Appears stated age.   Attitude: Calm and cooperative.   Behavior: Appropriate eye contact.   Motor Activity: No agitation or retardation. No EPS/TD. Normal gait and station. Normal muscle tone and bulk.   Speech: Regular rate, rhythm, volume and tone, spontaneous, fluent. Non-pressured.   Mood: \"Pretty good\"   Affect: Neutral.   Thought Process: Organized, and goal directed.   Thought Content: Does not currently endorse having any suicidal thoughts or suicide plans.  Does not endorse homicidal ideation.  No overt delusions or paranoia elicited.    Thought Perception: No auditory hallucinations or visual hallucinations were elicited or noted.   He does not appear to be responding to hallucinatory stimuli.   Cognition: Alert, oriented x 3. No deficits noted. Adequate fund of knowledge. No deficit in recent and remote memory. No deficits in attention, concentration or language.   Insight: Poor-to-Fair, as patient only recognizes some symptoms of  illness and need for recommended treatments.    Judgment: Poor-to-Fair, as patient can possibly make reasonable decisions about ordinary activities of daily living and necessary medical care " recommendations.             LABS:  No results found for this or any previous visit (from the past 96 hour(s)).       Last Recorded Vitals  Visit Vitals  /70 (Patient Position: Sitting)   Pulse 65   Temp 36.5 °C (97.7 °F) (Temporal)   Resp 16        Intake/Output last 3 Shifts:  No intake/output data recorded.    Relevant Results  Scheduled medications  FLUoxetine, 20 mg, oral, Daily  nicotine, 1 patch, transdermal, Daily   Followed by  [START ON 8/31/2024] nicotine, 1 patch, transdermal, Daily   Followed by  [START ON 9/14/2024] nicotine, 1 patch, transdermal, Daily      Continuous medications     PRN medications  PRN medications: alum-mag hydroxide-simeth, diphenhydrAMINE **OR** diphenhydrAMINE, haloperidol **OR** haloperidol lactate, hydrOXYzine pamoate, ibuprofen, LORazepam **OR** LORazepam, melatonin, nicotine polacrilex, psyllium               Assessment/Plan   1) Major Depressive Disorder, recurrent, severe without psychosis       Plan: 1) trial Fluoxetine 20 mg Qdaily (depression and anxiety)                2) Group and milieu therapy     2) Other Specified Anxiety Disorder       Plan: 1) see above          Joseph Briceno MD

## 2024-07-25 NOTE — CARE PLAN
"The patient's goals for the shift include \"sending out the letter I wrote\"    The clinical goals for the shift include maintain patient safety    Over the shift, the patient did not make progress toward the following goals. Barriers to progression include acuteness of illness. Recommendations to address these barriers include maintain Q 15 minute rounds for patient safety.    "

## 2024-07-25 NOTE — NURSING NOTE
Patient requested PRN melatonin at time of assessment, slept unbroken throughout the night. Q15 min checks maintained.

## 2024-07-25 NOTE — CARE PLAN
"The patient's goals for the shift include \"sleep\"    The clinical goals for the shift include maintain patient safety    Over the shift, the patient did not make progress toward the following goals. Barriers to progression include lack of medication knowledge. Recommendations to address these barriers include medication education and relaxation techniques to promote rest.    "

## 2024-07-25 NOTE — GROUP NOTE
"Group Topic: Gross Motor/Balance Skills   Group Date: 7/25/2024  Start Time: 1400  End Time: 1435  Facilitators: SAÚL FloresS   Department: [facility] REHAB THERAPY VIRTUAL    Number of Participants: 4   Group Focus: Movement/Physical activity, leisure skills  Treatment Modality: Recreation Therapy  Interventions utilized were: Pop Darts, Music,  leisure development  Purpose: Leisure Awareness, Coordinating Movement, Social Stimulation, Pleasurable Activity, coping skills    Name: Gerry Dhaliwal YOB: 2005   MR: [id number]      Facilitator: Recreational Therapist  Level of Participation: did not attend  Progress: None  Comments: This physical activity \"Pop Darts\" involved coordinating movements, social interactions, healthy competition, leisure awareness, and a pleasurable experience.     Patient remained in their room throughout the session resting/sleeping. Group was announced and his door was knocked on for encouragement to participate.     Plan: continue with services      "

## 2024-07-25 NOTE — PROGRESS NOTES
"Occupational Therapy     REHAB Therapy Assessment & Treatment    Patient Name: Gerry Dhaliwal  MRN: [id number]  Today's Date: 7/25/2024      Activity Assessment:     Goal Setting ID/Personal Awareness Skills Group: [id number]  Cognitive Tasks and Social Skills Group: [id number]  Pet Therapy Group: [id number]    3/3 Groups Attended    Pt present in all groups this date. Pt appeared pleasant, brighter affect (as compared to previous date), and observed smiling/laughing at times during select groups. During goal, pt expressed past roots \"Jewish morals, alone, selflessness, and god\" and expressed goals \"physical fitness, save money, and self-respect\" demo G carryover of task education and continues to share/socialize with peers more each day during groups. During pet, pt observed smiling and petting the therapy dog and expressed \"I have a cat that has anxiety so we never got anymore animals\" demo increased attention to task and willingness to share. Pt continues to make progress towards OT goals as evidenced above. Pt would benefit from continued OT services in order to improve overall self-esteem, personal confidence and positive supports for safe transition at discharge.          Encounter Problems       Encounter Problems (Active)       OT Goals       Community Resources (Progressing)       Start:  07/22/24    Expected End:  08/19/24       Pt. will ID 2-3 community resources/programs to join/attend after discharge to improve their support system.           Coping Skills (Progressing)       Start:  07/22/24    Expected End:  08/19/24       Pt. will identify 2-3 techniques to manage symptoms of diagnosis for increased daily function.           Routine (Progressing)       Start:  07/22/24    Expected End:  08/19/24       Pt. will identify 2-3 healthy habits to encourage a productive ADL routine.           Stress Management (Progressing)       Start:  07/22/24    Expected End:  08/19/24       Pt. will identify 2-3 stress " management techniques to increase daily function with ADL routine.           Goals (Progressing)       Start:  07/22/24    Expected End:  08/19/24       Pt. will ID 2-3 short term goals and 1-2 long term goals, including methods to achieve goals after discharge.                      Additional Comments:  LAMONT collaborated with patients nurse and charge nurse throughout the day to provide appropriate support and encouragement to attend groups. Pt up on unit when MIGUEL left last group of the day. All needs met.      Note complete by  Francesca LIVINGSTON/JUAN under the direct supervision of FARIDEH Lezama.

## 2024-07-25 NOTE — GROUP NOTE
"Group Topic: Feeling Awareness/Expression   Group Date: 7/25/2024  Start Time: 0730  End Time: 0800  Facilitators: DARYL Flores   Department: [facility] REHAB THERAPY VIRTUAL    Number of Participants: 2   Group Focus: check in, daily focus, and goals  Treatment Modality: Recreation Therapy  Interventions utilized were: 12 Steps to Happiness, exploration and support  Purpose: feelings, self-worth, and self-care    Name: Gerry Dhaliwal YOB: 2005   MR: [id number]      Facilitator: Recreational Therapist  Level of Participation: did not attend  Progress: None  Comments: This session focused on \"12 Steps to Happiness\". Participants were asked to reflect on each of the steps and share how they currently practice them or how they may set goals to incorporate them into routines. The steps included practicing thanks, give/receive, help, eat well, exercise, rest, experience leisure, hike, meditate, socialize, aim/set goals, and smile.     Patient remained in their room throughout the session resting/sleeping.     Plan: continue with services      "

## 2024-07-26 PROCEDURE — 99233 SBSQ HOSP IP/OBS HIGH 50: CPT | Performed by: PSYCHIATRY & NEUROLOGY

## 2024-07-26 PROCEDURE — 2500000001 HC RX 250 WO HCPCS SELF ADMINISTERED DRUGS (ALT 637 FOR MEDICARE OP): Performed by: PSYCHIATRY & NEUROLOGY

## 2024-07-26 PROCEDURE — 1240000001 HC SEMI-PRIVATE BH ROOM DAILY

## 2024-07-26 RX ORDER — FLUOXETINE HYDROCHLORIDE 20 MG/1
40 CAPSULE ORAL DAILY
Status: DISCONTINUED | OUTPATIENT
Start: 2024-07-28 | End: 2024-07-29 | Stop reason: HOSPADM

## 2024-07-26 RX ADMIN — FLUOXETINE 30 MG: 20 CAPSULE ORAL at 08:31

## 2024-07-26 ASSESSMENT — PAIN - FUNCTIONAL ASSESSMENT
PAIN_FUNCTIONAL_ASSESSMENT: 0-10
PAIN_FUNCTIONAL_ASSESSMENT: 0-10

## 2024-07-26 ASSESSMENT — PAIN SCALES - GENERAL
PAINLEVEL_OUTOF10: 0 - NO PAIN
PAINLEVEL_OUTOF10: 0 - NO PAIN

## 2024-07-26 NOTE — PROGRESS NOTES
Occupational Therapy     REHAB Therapy Assessment & Treatment    Patient Name: Gerry Dhaliwal  MRN: [id number]  Today's Date: 7/26/2024      Activity Assessment:     Stress ID and Management Skills Group: [id number]  Self-esteem and Social interaction Skills Group: [id number]  Mindfulness and Creative Arts Group: [id number]    0/3 Groups Attended    Pt encouraged to attend all groups this date however, declined and remained in bedroom sleeping. Pt would benefit from continued OT services in order to improve overall self-esteem, personal confidence and positive supports for safe transition at discharge.          Encounter Problems       Encounter Problems (Active)       OT Goals       Community Resources (Progressing)       Start:  07/22/24    Expected End:  08/19/24       Pt. will ID 2-3 community resources/programs to join/attend after discharge to improve their support system.           Coping Skills (Progressing)       Start:  07/22/24    Expected End:  08/19/24       Pt. will identify 2-3 techniques to manage symptoms of diagnosis for increased daily function.           Routine (Progressing)       Start:  07/22/24    Expected End:  08/19/24       Pt. will identify 2-3 healthy habits to encourage a productive ADL routine.           Stress Management (Progressing)       Start:  07/22/24    Expected End:  08/19/24       Pt. will identify 2-3 stress management techniques to increase daily function with ADL routine.           Goals (Progressing)       Start:  07/22/24    Expected End:  08/19/24       Pt. will ID 2-3 short term goals and 1-2 long term goals, including methods to achieve goals after discharge.                          Additional Comments:  MIGUEL collaborated with patients nurse and charge nurse throughout the day to provide appropriate support and encouragement to attend groups. Pt up on unit when MIGUEL left last group of the day. All needs met.      Note complete by  Francesca LIVINGSTON/JUAN under the direct  supervision of SRI LezamaT.

## 2024-07-26 NOTE — GROUP NOTE
"Group Topic: Goals   Group Date: 7/26/2024  Start Time: 0730  End Time: 0805  [facility]   Department: [facility] REHAB THERAPY VIRTUAL    Number of Participants: 3   Group Focus: check in, daily focus, and goals  Treatment Modality: Recreation Therapy  Interventions utilized were: Rules? What Rules? (Recovery areas handout), Thought for the Day (reading), 5 minute morning meditation,  support  Purpose: Goal identification, coping skills, insight or knowledge, and self-care    Name: Gerry Dhaliwal YOB: 2005   MR: [id number]      Facilitator: Recreational Therapist  Level of Participation: did not attend  Progress: None  Comments: A handout was provided and discussed that included six recovery concepts (be safe, be here, be honest, commit to goals, care for self and others, let go and move on).  Participants had the opportunity to share their thoughts about each of the areas and participate in a discussion on how to work on each. Patients were also asked to create a goal related to one or two of the categories. Patients were encouraged to reflect on a thought for the day reading which focused on \"prayer and meditation\". The group also completed a 5-minute guided morning meditation.    Patient remained in their room throughout the session resting/sleeping.  He joined the dining area when breakfast was announced and [facility] that [facility] would like to be discharged either today or tomorrow. He was encouraged to speak with his physician.     Plan: continue with services      "

## 2024-07-26 NOTE — GROUP NOTE
Group Topic: Social Skills   Group Date: 7/26/2024  Start Time: 1600  End Time: 1650  Facilitators: DARYL Flores   Department: [facility] REHAB THERAPY VIRTUAL    Number of Participants: 5   Group Focus: communication, feeling awareness/expression, healthy friendships, personal responsibility, and social skills  Treatment Modality: Recreation Therapy  Interventions utilized were: Living Skills-Interpersonal Skills (video and handout), exploration, patient education, and support  Purpose: coping skills, feelings, communication skills, and insight or knowledge    Name: Gerry Dhaliwal YOB: 2005   MR: [id number]      Facilitator: Recreational Therapist  Level of Participation: did not attend  Progress: None  Comments: We watched a video, had dialogue, and reviewed handouts around the living skill of communication.  The handouts included:  positive communication skills, healthy steps to conflict resolution, building/maintaining relationships, and identifying what you have learned in relationships.  The video included educational information and the personal sharing of individuals both in recovery and assisting a recovery process.    Patient remained in their room throughout the session resting/sleeping.     Plan: continue with services

## 2024-07-26 NOTE — CARE PLAN
"The patient's goals for the shift include \"get good sleep\"    The clinical goals for the shift include maintain safety    Over the shift, the patient made progress towards care plan goals. Patient rested quietly through the night. No PRNs given.    "

## 2024-07-26 NOTE — GROUP NOTE
Group Topic: Gross Motor/Balance Skills   Group Date: 7/26/2024  Start Time: 1400  End Time: 1450  Facilitators: SAÚL FloresS   Department: [facility] REHAB THERAPY VIRTUAL    Number of Participants: 3   Group Focus: Physical Activity, leisure skills  Treatment Modality: Recreation Therapy  Interventions utilized were: Shuffle Ball, [employer] Opening Ceremony, leisure development  Purpose: Leisure Awareness, Coordinating Movements, Social Stimulation, Pleasurable Activity     Name: Gerry Dhaliwal YOB: 2005   MR: [id number]      Facilitator: Recreational Therapist  Level of Participation: did not attend  Progress: None  Comments: This physical activity involved coordinating movements, social interactions, healthy competition, leisure awareness, and a pleasurable experience. This session involved a leisure activity called “Shuffle Ball”. Participants requested watching the opening ceremonies for the [employer] and they were accommodated.       Patient remained in their room throughout the session resting/sleeping.     Plan: continue with services

## 2024-07-26 NOTE — PROGRESS NOTES
"Gerry Dhaliwal is a 19 y.o. year old male patient who is on U admission day 6.      Subjective   Gerry Dhaliwal is a 19 y.o. year old male patient who was personally seen and interviewed, and discussed in morning team rounds. The patient was interviewed alone at the end of the hallway (interviewed sitting in a chair), and was easily engaged and cooperative. This morning, Gerry reports feeling \"good\" and currently rates his depression at a 0 out of 10. No suicidal ideation or suicide plans were elicited. He also rates his anxiety at a 0 out of 10. No hallucinations or paranoia were endorsed or noted.   Gerry slept 6 hours last night (broken).   The patient was reported to be more interactive in the unit groups yesterday morning, but was noted to be more withdrawn yesterday afternoon.           Objective   Mental Status Exam:   General: Appropriately groomed and dressed in hospital/casual attire.   Appearance: Appears stated age.   Attitude: Calm and cooperative.   Behavior: Appropriate eye contact.   Motor Activity: No agitation or retardation. No EPS/TD. Normal gait and station. Normal muscle tone and bulk.   Speech: Regular rate, rhythm, volume and tone, spontaneous, fluent. Non-pressured.   Mood: \"Good\"   Affect: Neutral.   Thought Process: Organized, and goal directed.   Thought Content: Does not currently endorse having any suicidal thoughts or suicide plans.  Does not endorse homicidal ideation.  No overt delusions or paranoia elicited.    Thought Perception: No auditory hallucinations or visual hallucinations were elicited or noted.   He does not appear to be responding to hallucinatory stimuli.   Cognition: Alert, oriented x 3. No deficits noted. Adequate fund of knowledge. No deficit in recent and remote memory. No deficits in attention, concentration or language.   Insight: Poor-to-Fair, as patient only recognizes some symptoms of  illness and need for recommended treatments.    Judgment: Poor-to-Fair, as " patient can possibly make reasonable decisions about ordinary activities of daily living and necessary medical care recommendations.             LABS:  No results found for this or any previous visit (from the past 96 hour(s)).       Last Recorded Vitals  Visit Vitals  /73 (BP Location: Right arm, Patient Position: Sitting)   Pulse 77   Temp 36.3 °C (97.3 °F) (Temporal)   Resp 16        Intake/Output last 3 Shifts:  No intake/output data recorded.    Relevant Results  Scheduled medications  FLUoxetine, 30 mg, oral, Daily  nicotine, 1 patch, transdermal, Daily   Followed by  [START ON 8/31/2024] nicotine, 1 patch, transdermal, Daily   Followed by  [START ON 9/14/2024] nicotine, 1 patch, transdermal, Daily      Continuous medications     PRN medications  PRN medications: alum-mag hydroxide-simeth, diphenhydrAMINE **OR** diphenhydrAMINE, haloperidol **OR** haloperidol lactate, hydrOXYzine pamoate, ibuprofen, LORazepam **OR** LORazepam, melatonin, nicotine polacrilex, psyllium               Assessment/Plan   1) Major Depressive Disorder, recurrent, severe without psychosis       Plan: *1) trial Fluoxetine 20 -> 30 mg Qdaily (depression and anxiety)                2) Group and milieu therapy     2) Other Specified Anxiety Disorder       Plan: 1) see above          Joseph Briceno MD

## 2024-07-26 NOTE — CARE PLAN
(Covering this case for assigned sw Chipmissy Adriana).  Discussed in Treatment team today;  patient still not disclosing location of gun;  denying everything today;  no discharge over the weekend; Phoned Barb Ha, Psychological Health Coordinator, [employer], in response to voice mail message left for Ms. Wright: gave her verbal update and also faxed her hard copy of today's and yesterday's RN and MD clinical notes. Informed her also of no plan for discharge today or over the weekend.  Stabilizing; Sw to follow.

## 2024-07-26 NOTE — NURSING NOTE
Pt had an uneventful night, pt slept well. No PRNs needed. Pt is currently sleeping in bed without any obvious physical signs or symptoms of distress. No new orders to carry out at this time. Q15 minute safety checks maintained.

## 2024-07-26 NOTE — CARE PLAN
"The patient's goals for the shift include \"find out about my discharge planning\"    The clinical goals for the shift include maintain patient safety    Over the shift, the patient did not make progress toward the following goals. Barriers to progression include acuteness of illness. Recommendations to address these barriers include maintain Q 15 minute rounds for patient safety.    "

## 2024-07-26 NOTE — NURSING NOTE
"Upon assessment pt is lying in bed in room. Withdrawn to self, guarded. Pt denied anxiety, depression, pain ,SI/HI and hallucinations. Strengths \"selfless, leader\" coping skills \"deep breathing\", goal \"good sleep\". Pt reports feeling \"decent\".  "

## 2024-07-26 NOTE — NURSING NOTE
"Patient stayed in room most of the shift, patient out of room for meals. Rated anxiety 0/10 and depression 0/10. Denied SI/HI. Denied auditory/visual/other hallucinations. No complaints of pain or discomfort. No PRN's given this shift. Medication compliant. Patient has not attended any group therapy sessions today. Able to state positive coping skills such as \"read bible\". Patient has been withdrawn this shift. Q 15 minute checks to be maintained throughout shift for safety.    "

## 2024-07-26 NOTE — NURSING NOTE
"Pt was assessed in his room. Pt was pleasant and cooperative. Pt denied all anxiety, depression, SI/HI, AVH, and pain at this time. Pt stated his goal is \"good sleep,\" his coping skill is \"deep breathing,\" and his strengths are \"selfless and leader.\" No PRNs needed. Pt is currently walking in the multani without any obvious physical signs or symptoms of distress. No new orders to carry out at this time. Q15 minute safety checks maintained.   "

## 2024-07-27 PROCEDURE — 99232 SBSQ HOSP IP/OBS MODERATE 35: CPT | Performed by: PSYCHIATRY & NEUROLOGY

## 2024-07-27 PROCEDURE — 1240000001 HC SEMI-PRIVATE BH ROOM DAILY

## 2024-07-27 PROCEDURE — 2500000001 HC RX 250 WO HCPCS SELF ADMINISTERED DRUGS (ALT 637 FOR MEDICARE OP): Performed by: PSYCHIATRY & NEUROLOGY

## 2024-07-27 RX ADMIN — FLUOXETINE 30 MG: 20 CAPSULE ORAL at 09:38

## 2024-07-27 RX ADMIN — Medication 5 MG: at 19:39

## 2024-07-27 ASSESSMENT — PAIN SCALES - GENERAL
PAINLEVEL_OUTOF10: 0 - NO PAIN
PAINLEVEL_OUTOF10: 0 - NO PAIN

## 2024-07-27 ASSESSMENT — PAIN - FUNCTIONAL ASSESSMENT
PAIN_FUNCTIONAL_ASSESSMENT: 0-10
PAIN_FUNCTIONAL_ASSESSMENT: 0-10

## 2024-07-27 NOTE — NURSING NOTE
"Pt was assessed in his room. Pt was pleasant and cooperative. Pt denied all anxiety, depression, SI/HI, AVH, and pain at this time. Pt stated his goal is \"good sleep,\" his coping skill is \"bible,\" and his strengths are \"selfless.\" PRN melatonin given. Pt is currently walking in the multani without any obvious physical signs or symptoms of distress. No new orders to carry out at this time. Q15 minute safety checks maintained.   "

## 2024-07-27 NOTE — CARE PLAN
"The patient's goals for the shift include \"I don't know.\"     The clinical goals for the shift include maintian safety, medication adherence, and participation in unit activities.    Over the shift, the patient did make progress toward the following goals. Barriers to progression include none. Recommendations to address these barriers include none.    "

## 2024-07-27 NOTE — CARE PLAN
The patient's goals for the shift include good sleep    The clinical goals for the shift include maintain safty    Problem: Sensory Perceptual Alteration as Evidenced by  Goal: Cooperates with admission process  Outcome: Progressing  Goal: Patient/Family participate in treatment and discharge plans  Outcome: Progressing  Goal: Patient/Family verbalizes awareness of resources  Outcome: Progressing  Goal: Participates in unit activities  Outcome: Progressing  Goal: Discusses signs/symptoms of illness/treatment options  Outcome: Progressing  Goal: Initiates reality-based interactions  Outcome: Progressing  Goal: Able to discuss content of hallucinations/delusions  Outcome: Progressing  Goal: Notifies staff when experiencing hallucinations/delusions  Outcome: Progressing  Goal: Verbalizes reduction in hallucinations/delusions  Outcome: Progressing  Goal: Will not act on psychotic perception  Outcome: Progressing  Goal: Understands least restrictive measures  Outcome: Progressing  Goal: Free from restraint events  Outcome: Progressing     Over the shift, the patient did make progress toward the following goals.

## 2024-07-27 NOTE — GROUP NOTE
Group Topic: Excercise/Physical    Group Date: 7/27/2024  Start Time: 1615  End Time: 1700  Facilitators: SAÚL PatelS   Department: [facility]    Number of Participants: 3   Group Focus: anxiety, other stress reduction, and relaxation  Treatment Modality: Recreational Therapy   Interventions utilized were Seated Yoga, exploration, patient education, and story telling  Purpose: other: gross motor skills, stress/anxiety reduction,     Name: Gerry Dhaliwal YOB: 2005   MR: [id number]      Facilitator: Recreational Therapist  Level of Participation: did not attend  Progress: None  Comments: Patients engaged in a 30 minute seated Yoga/Exercise program. We discussed the benefits of Yoga and/or related exercise (stress/anxiety reduction, improved circulation, strength, flexibility, etc...) and patients were encouraged to find ways to stay physically active to promote overall well-being.     Pt was in lounge during group, working on puzzle, but declined to engaged in seated yoga with peers.     Plan: continue with services

## 2024-07-27 NOTE — GROUP NOTE
"Group Topic: Leisure Skills   Group Date: 7/27/2024  Start Time: 1115  End Time: 1200  Facilitators: SAÚL PatelS   Department: [facility]    Number of Participants: 3   Group Focus: concentration, leisure skills, and social skills  Treatment Modality: Recreational Therapy   Interventions utilized were The Uzzle, leisure development and problem solving  Purpose: other: leisure awareness, cognitive skills/focus, social engagement, healthy competition     Name: Gerry Dhaliwal YOB: 2005   MR: [id number]      Facilitator: Recreational Therapist  Level of Participation: did not attend  Progress: None  Comments: Patients were gathered to learn and participate in \"The Uzzle\" game. This activity works on cognitive skills, following directions, positive social interaction/teamwork, and promotes leisure awareness.     Patient declined invitation to group activity at this time. Patient will continue to be provided with opportunities to enhance leisure skills and/or coping mechanisms.    Plan: continue with services      "

## 2024-07-27 NOTE — GROUP NOTE
"Group Topic: Coping Skills   Group Date: 7/27/2024  Start Time: 0930  End Time: 1015  Facilitators: DARYL Patel   Department: [facility]    Number of Participants: 6   Group Focus: coping skills, depression, goals, and personal responsibility  Treatment Modality: Recreational Therapy   Interventions utilized were Managing Your Depression, exploration, story telling, and support  Purpose: coping skills, insight or knowledge, and self-care    Name: Gerry Dhaliwal YOB: 2005   MR: [id number]      Facilitator: Recreational Therapist  Level of Participation: moderate  Quality of Participation: appropriate/pleasant, cooperative, passive, and quiet  Interactions with others: appropriate  Mood/Affect: brightens with interaction and flat  Triggers (if applicable): n/a  Cognition: coherent/clear  Progress: Moderate  Comments: Patients were provided with the handout \"Managing Your Depression\" which includes six main areas of discussion including (stay physically active, make time for pleasurable activities, spend time with people who can support you, practice relaxing, simple goals and small steps, and eat balanced nutritious meals). Patients were encouraged to share their thoughts/ideas in each area and make personal goals for the upcoming week.     Pt arrived late, but was provided handout and engaged in session with some encouragement and prompting. He shared about spending time with friends, joshua, and going to the gym. He hopes to be discharged by early next week in order to go to [place] with friends.     Plan: continue with services      "

## 2024-07-27 NOTE — GROUP NOTE
"Group Topic: Leisure Skills   Group Date: 7/27/2024  Start Time: 1400  End Time: 1500  Facilitators: SAÚL PatelS   Department: [facility]    Number of Participants: 3   Group Focus: leisure skills and social skills  Treatment Modality: Recreational Therapy   Interventions utilized were Sreekanth Flip, exploration and leisure development  Purpose: other: leisure awareness, cognitive skills/focus, social engagement, healthy competition    Name: Gerry Dhaliwal YOB: 2005   MR: [id number]      Facilitator: Recreational Therapist  Level of Participation: active  Quality of Participation: appropriate/pleasant, cooperative, and engaged  Interactions with others: appropriate  Mood/Affect: appropriate, brightens with interaction, and positive  Triggers (if applicable): n/a  Cognition: coherent/clear and goal directed  Progress: Moderate  Comments: Patients were gathered to learn and participate in \"Sreekanth Flip\". This activity works on cognitive skills, following directions, positive social interaction/teamwork, and promotes leisure awareness.     Pt was pleasant, social, and fully engaged in leisure activity with peers.     Plan: continue with services      "

## 2024-07-27 NOTE — PROGRESS NOTES
"Gerry Dhaliwal is a 19 y.o. year old male patient who is on U admission day 7.      Subjective   The patient was seen and examined. I reviewed the chart and vital signs from overnight. I reviewed previous notes. I reviewed medications, administered overnight and their reported benefits or side effects. Nurse reports pt slept 8.5 hours unbroken without event last night.     Per nursing nightshift: “Pt had an uneventful night, pt slept well. No PRNs needed. Pt is currently sleeping in bed without any obvious physical signs or symptoms of distress. No new orders to carry out at this time. Q15 minute safety checks maintained.”     On evaluation, this morning, pt is easily engaged and cooperative. Gerry reports feeling \"all right\" and currently rates his depression at a 0 out of 10. No suicidal ideation or suicide plans were elicited. He also rates his anxiety at a 0 out of 10. No hallucinations or paranoia were endorsed or noted.   Pt reports reading Bible is helpful.   Pt is compliant with medications, denies SEs except little tired.   Continue to monitor         Objective   Mental Status Exam:   General: Appropriately groomed and dressed in hospital/casual attire.   Appearance: Appears stated age.   Attitude: Calm and cooperative.   Behavior: Appropriate eye contact.   Motor Activity: No agitation or retardation. No EPS/TD. Normal gait and station. Normal muscle tone and bulk.   Speech: Regular rate, rhythm, volume and tone, spontaneous, fluent. Non-pressured.   Mood: \"all right\"   Affect: Neutral.   Thought Process: Organized, and goal directed.   Thought Content: Does not currently endorse having any suicidal thoughts or suicide plans.  Does not endorse homicidal ideation.  No overt delusions or paranoia elicited.    Thought Perception: No auditory hallucinations or visual hallucinations were elicited or noted.   He does not appear to be responding to hallucinatory stimuli.   Cognition: Alert, oriented x 3. No " deficits noted. Adequate fund of knowledge. No deficit in recent and remote memory. No deficits in attention, concentration or language.   Insight: Poor-to-Fair, as patient only recognizes some symptoms of  illness and need for recommended treatments.    Judgment: Poor-to-Fair, as patient can possibly make reasonable decisions about ordinary activities of daily living and necessary medical care recommendations.             LABS:  No results found for this or any previous visit (from the past 96 hour(s)).       Last Recorded Vitals  Visit Vitals  /65 (BP Location: Right arm, Patient Position: Sitting)   Pulse 73   Temp 36.7 °C (98.1 °F) (Temporal)   Resp 16        Intake/Output last 3 Shifts:  No intake/output data recorded.    Relevant Results  Scheduled medications  [START ON 7/28/2024] FLUoxetine, 40 mg, oral, Daily  nicotine, 1 patch, transdermal, Daily   Followed by  [START ON 8/31/2024] nicotine, 1 patch, transdermal, Daily   Followed by  [START ON 9/14/2024] nicotine, 1 patch, transdermal, Daily      Continuous medications     PRN medications  PRN medications: alum-mag hydroxide-simeth, diphenhydrAMINE **OR** diphenhydrAMINE, haloperidol **OR** haloperidol lactate, hydrOXYzine pamoate, ibuprofen, LORazepam **OR** LORazepam, melatonin, nicotine polacrilex, psyllium       Assessment/Plan   Gerry Dhaliwal is a 19 y.o. year old male patient who was brought to the ED by his commanding officer for suicidal thoughts and a possible plan to shot himself with his shotgun (after first texting suicidal statements to his ex-girlfriend's grandfather) (see Epat note below). Pt is admitted to [facility] 7/20/24.  On admission, Gerry reports experiencing feelings of depression for the past 3 months (mostly due to breakup with his ex-GF), along with mildly decreased sleep (initial insomnia), mildly decreased concentration, increased feelings of hopelessness and helplessness and worthlessness, and mild anhedonia, all for the past  "3 months. He also reports experiencing intermittent suicidal ideation since October, 2023 (when his ex-GF initially broke up with him) and worsening suicidal thoughts for the past 3 months, along with a possible suicide plan to shoot himself with his shotgun. Gerry reports experiencing prior depressive episodes, but not manic symptoms, in the past. No hallucinations or paranoia were endorsed or noted.  On admission, Gerry does endorse that he worries a little more than the average person about stressful things in his life, but denies worrying excessively about everyday activities. He also denies any symptoms of PTSD. Gerry did state that \"I'm not disclosing where it (shotgun) is, it's not in any place illegal. You'd call the police and then they would take it.\"    Dx  1) Major Depressive Disorder, recurrent, severe without psychosis       Plan: *1) trial Fluoxetine 20 -> 30 mg Qdaily (depression and anxietyà40mg qam starting 7/28/24);                       tolerated, continue to monitor                 2) Group and milieu therapy     2) Other Specified Anxiety Disorder       Plan: 1) see above    DISPOSITION:   -Collateral from outside resource.  -SW consulted to assist, collateral, psychosocial issues, and disposition  -Gun safety, per report, pt disclosed location of his gun yesterday.   -ELOS 5-7 days  -outpt referral for psychotherapy  -outpt referral for psychiatric followup    I spent 29 minutes in the professional and overall care of this patient.  Jessi Madison MD   "

## 2024-07-27 NOTE — CARE PLAN
"The patient's goals for the shift include \"good sleep\"    The clinical goals for the shift include maintian safety    Problem: Sensory Perceptual Alteration as Evidenced by  Goal: Cooperates with admission process  Outcome: Progressing  Goal: Patient/Family participate in treatment and discharge plans  Outcome: Progressing  Goal: Patient/Family verbalizes awareness of resources  Outcome: Progressing  Goal: Participates in unit activities  Outcome: Progressing  Goal: Discusses signs/symptoms of illness/treatment options  Outcome: Progressing  Goal: Initiates reality-based interactions  Outcome: Progressing  Goal: Able to discuss content of hallucinations/delusions  Outcome: Progressing  Goal: Notifies staff when experiencing hallucinations/delusions  Outcome: Progressing  Goal: Verbalizes reduction in hallucinations/delusions  Outcome: Progressing  Goal: Will not act on psychotic perception  Outcome: Progressing  Goal: Understands least restrictive measures  Outcome: Progressing  Goal: Free from restraint events  Outcome: Progressing     Over the shift, the patient did  make progress toward the following goals.   "

## 2024-07-27 NOTE — NURSING NOTE
"RN met with patient in his room early this shift for assessment. Patient lying prone in bed. Minimal eye contact observed. Denied anxiety, denied depression. Denied SI or HI and agreed to contract for safety. Denied AH or VH. Denied pain. Endorsed last BM as 07/25 and declined offer of PRN med. Endorsed sleeping \"good.\" Described mood as \"I don't want to do anything today.\"  Coping skills: \"Deep breathing.\"  Strengths: \"Selfless.\"  Goals: \"I don't know.\"    Medication adherent.   Attended 2 of 4 groups today. [facility] yet declined to sit with the group and/or participate.     Patient requested RN/writer complete an outgoing phone call to a peer on his behalf \"because I don't want to bother anyone.\" RN left message with other party, who returned call to patient approximately 15 minutes later.     No PRN medications requested or given during this shift  Q15 minutes safety checks maintained. Will continue to monitor.     "

## 2024-07-28 VITALS
HEIGHT: 68 IN | TEMPERATURE: 97.2 F | SYSTOLIC BLOOD PRESSURE: 130 MMHG | RESPIRATION RATE: 16 BRPM | DIASTOLIC BLOOD PRESSURE: 75 MMHG | OXYGEN SATURATION: 99 % | HEART RATE: 58 BPM | BODY MASS INDEX: 20.21 KG/M2 | WEIGHT: 133.36 LBS

## 2024-07-28 PROCEDURE — 2500000001 HC RX 250 WO HCPCS SELF ADMINISTERED DRUGS (ALT 637 FOR MEDICARE OP): Performed by: PSYCHIATRY & NEUROLOGY

## 2024-07-28 PROCEDURE — 99232 SBSQ HOSP IP/OBS MODERATE 35: CPT | Performed by: PSYCHIATRY & NEUROLOGY

## 2024-07-28 PROCEDURE — 1240000001 HC SEMI-PRIVATE BH ROOM DAILY

## 2024-07-28 RX ADMIN — Medication 5 MG: at 20:11

## 2024-07-28 RX ADMIN — FLUOXETINE 40 MG: 20 CAPSULE ORAL at 08:43

## 2024-07-28 ASSESSMENT — PAIN - FUNCTIONAL ASSESSMENT
PAIN_FUNCTIONAL_ASSESSMENT: 0-10
PAIN_FUNCTIONAL_ASSESSMENT: 0-10

## 2024-07-28 ASSESSMENT — PAIN SCALES - GENERAL
PAINLEVEL_OUTOF10: 0 - NO PAIN
PAINLEVEL_OUTOF10: 3

## 2024-07-28 ASSESSMENT — COLUMBIA-SUICIDE SEVERITY RATING SCALE - C-SSRS
1. SINCE LAST CONTACT, HAVE YOU WISHED YOU WERE DEAD OR WISHED YOU COULD GO TO SLEEP AND NOT WAKE UP?: NO
2. HAVE YOU ACTUALLY HAD ANY THOUGHTS OF KILLING YOURSELF?: NO
6. HAVE YOU EVER DONE ANYTHING, STARTED TO DO ANYTHING, OR PREPARED TO DO ANYTHING TO END YOUR LIFE?: NO

## 2024-07-28 NOTE — GROUP NOTE
"Group Topic: Leisure Skills   Group Date: 7/28/2024  Start Time: 1400  End Time: 1515  Facilitators: SAÚL PatelS   Department: [facility]    Number of Participants: 4   Group Focus: leisure skills and social skills  Treatment Modality: Recreational Therapy   Interventions utilized were Sreekanth Attack/Flip, leisure development and mental fitness  Purpose: other: cognitive skills/focus, social engagement, leisure awareness, health competition     Name: Gerry Dhaliwal YOB: 2005   MR: [id number]      Facilitator: Recreational Therapist  Level of Participation: active  Quality of Participation: appropriate/pleasant, cooperative, and engaged  Interactions with others: appropriate  Mood/Affect: appropriate, brightens with interaction, and positive  Triggers (if applicable): n/a  Cognition: coherent/clear and goal directed  Progress: Moderate  Comments: Patients were gathered to learn and participate in \"Sreekanth Attack/Flip\". This activity works on cognitive skills, following directions, positive social interaction/teamwork, and promotes leisure awareness.     Pt was pleasant, social, and fully engaged in group task.     Plan: continue with services      "

## 2024-07-28 NOTE — GROUP NOTE
"Group Topic: Social Skills   Group Date: 7/28/2024  Start Time: 0930  End Time: 1030  Facilitators: SAÚL PatelS   Department: [facility]ER[facility]    Number of Participants: 3   Group Focus: coping skills, leisure skills, and social skills  Treatment Modality: Recreational Therapy   Interventions utilized were Social Jenga, exploration, leisure development, mental fitness, and story telling  Purpose: coping skills and other: cognitive skills/focus, fine motor skills, social engagement, leisure awareness     Name: Gerry Dhaliwal YOB: 2005   MR: [id number]      Facilitator: Recreational Therapist  Level of Participation: did not attend  Progress: None  Comments: Patients were gathered to learn and participate in a game of \"Social Jenga\". As patients pulled blocks, they were prompted with ice breakers, brain teasers, and coping skill related questions. This activity works on cognitive skills, following directions, positive social interaction/teamwork, and promotes leisure awareness.     Patient declined invitation to group activity at this time. Patient will continue to be provided with opportunities to enhance leisure skills and/or coping mechanisms.    Plan: continue with services      "

## 2024-07-28 NOTE — CARE PLAN
"The patient's goals for the shift include \"calling 2 people today.\"     The clinical goals for the shift include maintain safty. Medication a    Over the shift, the patient did make progress toward the following goals. Barriers to progression include none. Recommendations to address these barriers include none.    "

## 2024-07-28 NOTE — PROGRESS NOTES
"Gerry Dhaliwal is a 19 y.o. year old male patient who is on U admission day 8.      Subjective   The patient was seen and examined. I reviewed the chart and vital signs from overnight. I reviewed previous notes. I reviewed medications, administered overnight and their reported benefits or side effects. Nurse reports pt slept 8.5 hours unbroken without event last night.      On evaluation, this morning, pt is easily engaged and cooperative. Gerry reports feeling \"decent\" and currently rates his depression at a 0 out of 10. No suicidal ideation or suicide plans were elicited. He also rates his anxiety at a 0 out of 10. No hallucinations or paranoia were endorsed or noted.   Pt reports reading Bible is helpful.   Pt is compliant with medications, dose of Fluoxetine increased to 40mg this morning. denies SEs except little tired.   Continue to monitor         Objective   Mental Status Exam:   General: Appropriately groomed and dressed in hospital/casual attire.   Appearance: Appears stated age.   Attitude: Calm and cooperative.   Behavior: Appropriate eye contact.   Motor Activity: No agitation or retardation. No EPS/TD. Normal gait and station. Normal muscle tone and bulk.   Speech: Regular rate, rhythm, volume and tone, spontaneous, fluent. Non-pressured.   Mood: \"decent\"   Affect: Neutral.   Thought Process: Organized, and goal directed.   Thought Content: Does not currently endorse having any suicidal thoughts or suicide plans.  Does not endorse homicidal ideation.  No overt delusions or paranoia elicited.    Thought Perception: No auditory hallucinations or visual hallucinations were elicited or noted.   He does not appear to be responding to hallucinatory stimuli.   Cognition: Alert, oriented x 3. No deficits noted. Adequate fund of knowledge. No deficit in recent and remote memory. No deficits in attention, concentration or language.   Insight: gaining, as patient only recognizes some symptoms of  illness and " need for recommended treatments.    Judgment: gaining, as patient can possibly make reasonable decisions about ordinary activities of daily living and necessary medical care recommendations.             LABS:  No results found for this or any previous visit (from the past 96 hour(s)).       Last Recorded Vitals  Visit Vitals  /65 (BP Location: Right arm, Patient Position: Sitting)   Pulse 74   Temp 36.5 °C (97.7 °F)   Resp 16        Intake/Output last 3 Shifts:  No intake/output data recorded.    Relevant Results  Scheduled medications  FLUoxetine, 40 mg, oral, Daily  nicotine, 1 patch, transdermal, Daily   Followed by  [START ON 8/31/2024] nicotine, 1 patch, transdermal, Daily   Followed by  [START ON 9/14/2024] nicotine, 1 patch, transdermal, Daily      Continuous medications     PRN medications  PRN medications: alum-mag hydroxide-simeth, diphenhydrAMINE **OR** diphenhydrAMINE, haloperidol **OR** haloperidol lactate, hydrOXYzine pamoate, ibuprofen, LORazepam **OR** LORazepam, melatonin, nicotine polacrilex, psyllium       Assessment/Plan   Gerry Dhaliwal is a 19 y.o. year old male patient who was brought to the ED by his commanding officer for suicidal thoughts and a possible plan to shot himself with his shotgun (after first texting suicidal statements to his ex-girlfriend's grandfather) (see Epat note below). Pt is admitted to hospitals 7/20/24.  On admission, Gerry reports experiencing feelings of depression for the past 3 months (mostly due to breakup with his ex-GF), along with mildly decreased sleep (initial insomnia), mildly decreased concentration, increased feelings of hopelessness and helplessness and worthlessness, and mild anhedonia, all for the past 3 months. He also reports experiencing intermittent suicidal ideation since October, 2023 (when his ex-GF initially broke up with him) and worsening suicidal thoughts for the past 3 months, along with a possible suicide plan to shoot himself with his  "shotgun. Gerry reports experiencing prior depressive episodes, but not manic symptoms, in the past. No hallucinations or paranoia were endorsed or noted.  On admission, Gerry does endorse that he worries a little more than the average person about stressful things in his life, but denies worrying excessively about everyday activities. He also denies any symptoms of PTSD. Gerry did state that \"I'm not disclosing where it (shotgun) is, it's not in any place illegal. You'd call the police and then they would take it.\"    Dx  1) Major Depressive Disorder, recurrent, severe without psychosis       Plan: *1) trial Fluoxetine 20 -> 30 mg Qdaily (depression and anxietyà40mg qam starting 7/28/24);                       tolerated, continue to monitor                 2) Group and milieu therapy     2) Other Specified Anxiety Disorder       Plan: 1) see above    DISPOSITION:   -Collateral from outside resource.  -SW consulted to assist, collateral, psychosocial issues, and disposition  -Gun safety, per report, pt disclosed location of his gun yesterday.   -SHANNANOS 5-7 days  -outpt referral for psychotherapy  -outpt referral for psychiatric followup    I spent 29 minutes in the professional and overall care of this patient.  Jessi Madison MD   "

## 2024-07-28 NOTE — GROUP NOTE
"Group Topic: Leisure Skills   Group Date: 7/28/2024  Start Time: 1115  End Time: 1200  Facilitators: SAÚL PatelS   Department: [facility] REHAB THERAPY VIRTUAL    Number of Participants: 2   Group Focus: concentration, leisure skills, and social skills  Treatment Modality: Recreational Therapy   Interventions utilized were Pling Pong, exploration and leisure development  Purpose: other: leisure awareness, motor skills, social engagement, healthy competition     Name: Gerry Dhaliwal YOB: 2005   MR: [id number]      Facilitator: Recreational Therapist  Level of Participation: did not attend  Progress: None  Comments: Patients were gathered and encouraged to actively participate in the game \"Pling Pong\". This activity works on motor skills/coordinating movements, healthy competition, social interaction, and provides an overall pleasurable experience.     Patient declined invitation to group activity at this time. Patient will continue to be provided with opportunities to enhance leisure skills and/or coping mechanisms.    Plan: continue with services      "

## 2024-07-28 NOTE — NURSING NOTE
"RN met with patient in the hallway early in the shift. Patient sitting at table, reading quietly. Patient denies anxiety, denies depression, denies SI, HI, or thoughts of harm to self or others. Patient agreed to contract for safety. Patient denied AH or VH. Endorsed pain via \"toothache, for a few days now,\" adding he concedes he has denied pain each time asked since he has been here. Offered PRN Ibuprofen per order, however patient declined, stating \"I don't need anything.\" Patient endorsed he can seek care for toothache on an outpatient basis.   Endorsed last BM as \"three days ago\" (07/25) explaining this is typical for him. Declined offer of PRN Psyllium (Metamucil).   Coping skills: \"Deep breathing.\"  Strengths: \"Selfless.\"  Goals: \"Calling 2 people today.\"    Medication adherent.  Attended 1 of 3 groups this shift.  Watching the Summer Olympics with peers in the dining area during portions of this shift. Also observed spending some time alone seated at table outside rooms 205-206.     Did not request any PRN medications this shift.     Q15 minutes safety checks maintained. Will continue to monitor.     "

## 2024-07-29 VITALS
WEIGHT: 133.36 LBS | HEART RATE: 84 BPM | HEIGHT: 68 IN | BODY MASS INDEX: 20.21 KG/M2 | TEMPERATURE: 97 F | SYSTOLIC BLOOD PRESSURE: 106 MMHG | OXYGEN SATURATION: 99 % | RESPIRATION RATE: 16 BRPM | DIASTOLIC BLOOD PRESSURE: 55 MMHG

## 2024-07-29 PROBLEM — F32.A DEPRESSION WITH SUICIDAL IDEATION: Status: RESOLVED | Noted: 2024-07-20 | Resolved: 2024-07-29

## 2024-07-29 PROBLEM — R45.851 DEPRESSION WITH SUICIDAL IDEATION: Status: RESOLVED | Noted: 2024-07-20 | Resolved: 2024-07-29

## 2024-07-29 PROCEDURE — 2500000001 HC RX 250 WO HCPCS SELF ADMINISTERED DRUGS (ALT 637 FOR MEDICARE OP): Performed by: PSYCHIATRY & NEUROLOGY

## 2024-07-29 PROCEDURE — 99239 HOSP IP/OBS DSCHRG MGMT >30: CPT | Performed by: PSYCHIATRY & NEUROLOGY

## 2024-07-29 PROCEDURE — 97150 GROUP THERAPEUTIC PROCEDURES: CPT | Mod: GO,CO

## 2024-07-29 RX ORDER — FLUOXETINE HYDROCHLORIDE 40 MG/1
40 CAPSULE ORAL DAILY
Qty: 30 CAPSULE | Refills: 0 | Status: SHIPPED | OUTPATIENT
Start: 2024-07-30 | End: 2024-08-29

## 2024-07-29 RX ADMIN — FLUOXETINE 40 MG: 20 CAPSULE ORAL at 08:51

## 2024-07-29 ASSESSMENT — PAIN - FUNCTIONAL ASSESSMENT: PAIN_FUNCTIONAL_ASSESSMENT: 0-10

## 2024-07-29 ASSESSMENT — PAIN SCALES - GENERAL: PAINLEVEL_OUTOF10: 0 - NO PAIN

## 2024-07-29 NOTE — DISCHARGE INSTR - APPOINTMENTS
[facility] August 1, 2024 @ 09:00 (Virtual therapy with Xavier Sarmiento)                               August 5, 2024 @ 10:00 Am in person  with Quin Brownlee, BR[place]  [id number] . [address] [place] [id number]  Ph:  [phone]  Fax: [phone]    (Check email for paperwork for this appointment. Please complete prior to appointment. If you need to cancel they require 48 hour advance notice to avoid a fee.)

## 2024-07-29 NOTE — CARE PLAN
Problem: Sensory Perceptual Alteration as Evidenced by  Goal: Cooperates with admission process  Outcome: Adequate for Discharge  Goal: Patient/Family participate in treatment and discharge plans  Outcome: Adequate for Discharge  Goal: Patient/Family verbalizes awareness of resources  Outcome: Adequate for Discharge  Goal: Participates in unit activities  Outcome: Adequate for Discharge  Goal: Discusses signs/symptoms of illness/treatment options  Outcome: Adequate for Discharge  Goal: Initiates reality-based interactions  Outcome: Adequate for Discharge  Goal: Able to discuss content of hallucinations/delusions  Outcome: Adequate for Discharge  Goal: Notifies staff when experiencing hallucinations/delusions  Outcome: Adequate for Discharge  Goal: Verbalizes reduction in hallucinations/delusions  Outcome: Adequate for Discharge  Goal: Will not act on psychotic perception  Outcome: Adequate for Discharge  Goal: Understands least restrictive measures  Outcome: Adequate for Discharge  Goal: Free from restraint events  Outcome: Adequate for Discharge     Problem: Risk for Suicide  Goal: Accepts medications as prescribed/needed this shift  Outcome: Adequate for Discharge  Goal: Identifies supports this shift  Outcome: Adequate for Discharge  Goal: Makes needs known through verbalization or behaviors this shift  Outcome: Adequate for Discharge  Goal: No self harm this shift  Outcome: Adequate for Discharge  Goal: Read Safety Guidelines this shift  Outcome: Adequate for Discharge  Goal: Complete Mental Health Safety Plan (psychiatry only) this shift  Outcome: Adequate for Discharge     Problem: Discharge Planning - Care Management  Goal: Discharge to post-acute care or home with appropriate resources  Outcome: Adequate for Discharge     Problem: Community resource needs  Goal: Patient is receiving increased resource support to enhance ability to remain at home  Outcome: Adequate for Discharge     Problem: Mental health  issues  Goal: Stabilize adverse mental health factors affecting plan of care  Outcome: Adequate for Discharge     Problem: Access to Care Issue  Goal: Assess and Address Access Barriers  Outcome: Adequate for Discharge     Problem: Assessment of Caregiver  Goal: Caregiver Demonstrates Personal Health and Wellbeing; as well as Ability to Safely and Effectively Care for Patient  Outcome: Adequate for Discharge     Problem: Assistance Required for Daily Activities  Goal: Develop a Plan to Assist Patient with Activites of Daily Living  Outcome: Adequate for Discharge     Problem: Coordination of Community Resources Needed  Goal: Coordination of Services will be Obtained  Outcome: Adequate for Discharge     Problem: Coordination of Psychosocial Support Services Needed  Goal: Coordination of Services will be Obtained  Outcome: Adequate for Discharge     Problem: Medication Adherence  Goal: Adherence to Medication Regimen  Outcome: Adequate for Discharge     Problem: Financial Problem  Goal: Assess and Address Specific Financial Obstacles Affecting Patient's Adderence to Plan of Care  Outcome: Adequate for Discharge  Goal: STG - Patient will complete simple calculations for time/money management  Outcome: Adequate for Discharge     Problem: Risk of Exacerbation, or Readmission to Hospital Related to Symptoms of Comorbidities  Goal: Knowledge and Symptom Management of Chronic Disease will be Documented  Outcome: Adequate for Discharge     Problem: Risk of Uncoordinated Care  Goal: Care will be Coordinated and Supported by a Multidisciplinary Team of Providers  Outcome: Adequate for Discharge     Problem: Negative Experience, Conflict with, or Distrust of Providers and/or Health System  Goal: Plan to Address Patient Specific Negative Experience, Distrust, or Conflict with Providers and/or Health System  Outcome: Adequate for Discharge

## 2024-07-29 NOTE — GROUP NOTE
Group Topic: Music Therapy   Group Date: 7/29/2024  Start Time: 0930  End Time: 1020  Facilitators: Juliana Burnette   Department: [facility] EXPRESSIVE THER VIRTUAL    Number of Participants: 6   Group Focus: communication/socialization, expressive outlet, and music therapy  Treatment Modality: Music Therapy  Interventions Utilized were: active music engagement, education/instruction, and sharing/discussion    Participants engaged in active drumming with a variety of prompts and levels of direction and were encouraged to share about their experiences and advocate for keeping or switching their instrument. Deep breathing and humming were used to close.    Name: Gerry Dhaliwal YOB: 2005   MR: [id number]      Level of Participation: active  Quality of Participation: appropriate/pleasant, engaged, and offered feedback  Interactions with others: supportive and asked thoughtful questions  Mood/Affect: appropriate and bright  Cognition, Pre Treatment: attentive and concrete  Cognition, Post Treatment: goal directed and insightful  Progress: Moderate  Plan: continue with services    Pt. readily engaged with all experiences, exchanged drums, shared his thoughts, and asked questions about the instruments. Pt. shared his previous music experience and appeared motivated by active music making.

## 2024-07-29 NOTE — PROGRESS NOTES
"Occupational Therapy     REHAB Therapy Assessment & Treatment    Patient Name: Gerry Dhaliwal  MRN: [id number]  Today's Date: 7/29/2024      Activity Assessment:     Music Coping Skills Group: [id number]  ANTHONY Support Group: [id number]    2/2 Groups Attended    Pt present in all groups this date. Pt appeared in good spirits, increased engagement, and cooperative. 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 During ANTHONY, pt expressed \"I feel decent\" and expressed a coping strategy \"reading and deep breathing\" demo G techniques to manage stressors. Pt continues to engage in group discussions and continues to make progress towards OT goals as evidenced above. Pt would benefit from continued OT services in order to improve overall self-esteem, personal confidence and positive supports for safe transition at discharge.          Encounter Problems       Encounter Problems (Active)       OT Goals       Community Resources (Progressing)       Start:  07/22/24    Expected End:  08/19/24       Pt. will ID 2-3 community resources/programs to join/attend after discharge to improve their support system.           Coping Skills (Progressing)       Start:  07/22/24    Expected End:  08/19/24       Pt. will identify 2-3 techniques to manage symptoms of diagnosis for increased daily function.           Routine (Progressing)       Start:  07/22/24    Expected End:  08/19/24       Pt. will identify 2-3 healthy habits to encourage a productive ADL routine.           Stress Management (Progressing)       Start:  07/22/24    Expected End:  08/19/24       Pt. will identify 2-3 stress management techniques to increase daily function with ADL routine.           Goals (Progressing)       Start:  07/22/24    Expected End:  08/19/24       Pt. will ID 2-3 short term " goals and 1-2 long term goals, including methods to achieve goals after discharge.                          Additional Comments:  MIGUEL collaborated with patients nurse and charge nurse throughout the day to provide appropriate support and encouragement to attend groups. Pt up on unit when MIGUEL left last group of the day. All needs met.      Note complete by  Francesca LIVINGSTON/JUAN under the direct supervision of Alesia MIGUEL/FARIDEH RENTERIA.

## 2024-07-29 NOTE — NURSING NOTE
"  Pt was assessed in his room. Pt was pleasant and cooperative. Pt denied all anxiety, depression, SI/HI, AVH, and pain at this time. Pt stated his goal is \"good sleep,\" his coping skill is \"reading,\" and his strengths are \"selfless.\" PRN melatonin given. Pt is currently walking in the multani without any obvious physical signs or symptoms of distress. No new orders to carry out at this time. Q15 minute safety checks maintained.                  "

## 2024-07-29 NOTE — DISCHARGE SUMMARY
"Admission Date: 7-  Discharge Date: 7-      Reason For Admission: Suicidal thoughts with a suicide plan.       Discharge Diagnosis  1) Major Depressive Disorder, recurrent, severe without psychosis   2) Other Specified Anxiety Disorder         Initial Admission :   Gerry Dhaliwal is a 19 y.o. year old male patient who was brought to the ED by his commanding officer for suicidal thoughts and a possible plan to shot himself with his shotgun (after first texting suicidal statements to his ex-girlfriend's grandfather) (see Epat note below). On admission, Gerry reports experiencing feelings of depression for the past 3 months (mostly due to breakup with his ex-GF), along with mildly decreased sleep (initial insomnia), mildly decreased concentration, increased feelings of hopelessness and helplessness and worthlessness, and mild anhedonia, all for the past 3 months. He also reports experiencing intermittent suicidal ideation since October, 2023 (when his ex-GF initially broke up with him) and worsening suicidal thoughts for the past 3 months, along with a possible suicide plan to shoot himself with his shotgun. Gerry reports experiencing prior depressive episodes, but not manic symptoms, in the past. No hallucinations or paranoia were endorsed or noted.       Gerry does endorse that he worries a little more than the average person about stressful things in his life, but denies worrying excessively about everyday activities. He also denies any symptoms of PTSD.        Gerry did state that \"I'm not disclosing where it (shotgun) is, it's not in any place illegal. You'd call the police and then they would take it.\"          Per EPAT Assessment of 7-:  Patient is a 19 year old male, with a history of MDD, brought in by commanding officer for suicidal ideation. ED provider note, nursing notes, [place] suicide risk scale and community records reviewed, patient reportedly made suicidal statement through " text to ex girlfriend’s grandfather. He therefore contacted the [facility] regarding his message and informed the leadership that patient is having thoughts of hurting himself. The commanding officer and another officer sat down and talked to patient, he admitted to active SI with a plan to use a shotgun, they brought him to the ED for psych eval. Triage indicates high risk, negative BAL and UDS. Patient is not currently linked with any outpatient services or on psych meds. He used to see psychiatrist and therapist in middle school and high school, had one IP admission in freshman year for SI. No hx of SA or NSSIB. Patient has been in  for 3 years and 2 months now.      Prior to assessment, patient is calm and cooperative, comply with all lab works. Upon assessment, he presents as anxious with dysphoric affect. Patient endorses low mood, difficulty controlling worries, excessive worries, anhedonia, fatigue, helplessness, hopelessness, binge eating on sweets, persistent thoughts of death, irritability and impulsivity. He denies homicidal ideation, visual/auditory hallucinations or delusional thinking. Patient reports he has been experiencing worsening depression and suicidal thoughts for the past several months due to finance, schooling, family issues, relationship and friendship. He seems to be minimizing his suicidality, reports vague SI. When being asked about his plans, he states “yeah I've been thinking about it, pretty often actually”, unable/unwilling to share. He denies having argument with ex gf's grandfather, stating they broke up 10/2023, and stopped contact long time ago. Patient does not seem internally stimulated or under acute distress, however he is unable to safety plan with future orientation, coping mechanisms, social or outpatient support.     Spoke with commanding officer Stephen, he reports great concerns for patient. He states he has known patient for the past 1.5 years, “he  "has always been a quiet, kept to himself and self proclaimed person, however at times heard about him being emotional, interjecting into groups and not engaging with peers”. He states patient has shared some issues with ex-girlfriend and one female friend in [place], as well as disagreements with family. Stephen reports he sat down with patient and another officer this afternoon after getting a notice from his supervisor, patient admitted to having plan to shoot himself or use a shotgun, “he said he wouldn't do it in  but don't what will happen when he goes home to [place]”. He reports concerns for his increasing depression and lack of supportive in his life, “I'm afraid when he goes home at the end of the summer, he would do something to himself with the triggers of family and female friend”. Stephen states he works Mon-Fri, his supervisor is still deciding on his order regarding his current mental health, he would like to be reached by psych unit with discharge planning.        Hospital Course:       Following admission to the [facility] at [facility], Gerry was started on Fluoxetine 40 mg Qdaily, to help treat his depressive symptoms. Gerry also attended unit groups to help with coping skills, stating \"I really liked the leisure activities. At the time of discharge, Gerry denied experiencing any hallucinations, paranoia, significant anxiety, manic symptoms, or symptoms of Major Depressive Disorder.    The patient signed in voluntarily onto the unit during his hospital stay.          Mental Status Exam:   General: Appropriately groomed and dressed in casual attire.   Appearance: Appears stated age.   Attitude: Calm, cooperative.   Behavior: Appropriate eye contact.   Motor Activity: No agitation or retardation. No EPS/TD. Normal gait and station. Normal muscle tone and bulk.   Speech: Regular rate, rhythm, volume and tone, spontaneous,  fluent. Non-pressured.   Mood: \"Pretty good\"   Affect: Neutral. " "  Thought Process: Organized, linear, goal directed.   Thought Content: Does not endorse suicidal ideation or any suicide plans.   Does not endorse homicidal ideation.  No overt delusions or paranoia elicited.   Thought Perception: Does not endorse auditory or visual hallucinations, does not appear to be responding to hallucinatory stimuli.   Cognition: Alert, oriented x 3. No deficits noted.  Adequate fund of knowledge. No deficit in recent and remote memory. No deficits in attention, concentration or language.   Insight: Fair-to-good, as patient recognizes symptoms of  illness and need for recommended treatments.    Judgment: Intact, as patient can make reasonable decisions about ordinary activities of daily living and necessary medical care recommendations.            Risk Assessment at Discharge:  Gerry is judged a minimal suicide risk due to: 1) Patient's shotgun has been secured out of his mother's house (where he lives) and is currently secured at his father's house (patient does not have access to father's house), 2) Denies any prior suicide attempts, 3) Denies any current suicidal ideation or suicide plans, 4) +plans for the future: \"... I want to become full time in the [employer]... complete flight school to get my 's license...,\" and 5) No symptoms of Major Depressive Disorder elicited at discharge.            Discharge Medications:  Scheduled medications  FLUoxetine, 40 mg, oral, Daily      Continuous medications     PRN medications  PRN medications: alum-mag hydroxide-simeth, diphenhydrAMINE **OR** diphenhydrAMINE, haloperidol **OR** haloperidol lactate, hydrOXYzine pamoate, ibuprofen, LORazepam **OR** LORazepam, melatonin, nicotine polacrilex, psyllium         I spent over 30 minutes in the preparation of this summary. All 11 elements of the transition record were discussed with the patient and or caregiver and the receiving inpatient facility (if applicable).  A copy of the " transition record was given to the patient and was transmitted to the outpatient provider accepting the patient's care following  discharge.    Patient's illness, medication side effects, benefits and risk were reviewed with the patient prior to discharge. The patient voiced understanding of their diagnosis, the medications recommended along with the importance of mediation compliance.  The patient was counseled not to stop medications without the supervision of a psychiatrist.  The patient was counseled to follow-up with their outpatient medical provider as indicated. The patient was counseled that if there was an increase in mental health issues, depression, anxiety, medication side effects, self harming thoughts or thoughts to harm others, to call [facility] or 911 and come to the nearest emergency room. The patient also received information regarding advanced mental and medical health directives during this hospitalization which they could discuss with their outpatient provider. The plan was discussed with the patient, the nurse and the social work department. The patient voiced understanding and agreement with the plan.  ------------------------------------------------------------------------------------------------------------------------------------------------------------------------------------------------------  Substance Use Risk Assessment:    Nicotine: Risks of continued tobacco use was addressed with the patient which included: inpatient education and counseling of the risks of oral, esophageal as well as other organ cancers (including oral, dermatological, gastric, pancreatic, respiratory) along with the ongoing risk of neurological and cardiovascular disease/events (strokes, angina). Treatment options for cessation were offered to include: alternate tobacco products, both inpatient/outpatient counseling. Replacement products were offered during this admission and prescribed at the time of  discharge along with a referral to outpatient cessation counseling.    Alcohol: The increase in morbidity and mortality, financial, both interpersonal and physical health risk in direct relationship to the use of alcohol ( in either a binge pattern or a sustained use over time) was discussed with the patient. Risks of intoxication, disinhibition, legal and interpersonal issues as well as abuse and dependence, along with the    increased risks of organ damage (cardiac, neurological, esophageal, gastric, liver, pancreatic, renal dysfunction among others) was discussed. The risks of decreased hepatic clearance and increased medication serum drug levels along with increase in potential medication side effects, was also discussed.   Options for treatment: Discussed was reduction in alcohol consumption, referral to dual diagnosis program, residential rehabilitation programs, AA, NA, ANTHONY, gabapentin and oral naltrexone, if meets criteria as a candidate for these medications.    Street Drugs: Street drug use was addressed on admission, including both physical, mental, financial and psychological risk factors of ongoing use. There are no FDA prescribed treatment medications for cannabis, stimulants use/abuse (cocaine, PCP) or hallucinogens.  Patient was screened for concomitant other drugs used (tobacco, alcohol). Treatment options available were discussed ( if applicable) AA, NA, ANTHONY, and outpatient dual diagnosis therapy, treatment programs. Patient voiced understanding of their treatment options.          Plan:  1) Continue current medications as prescribed at discharge.  2) Follow-up:    [facility] August 1, 2024 @ 09:00 (Virtual therapy with Xavier Sarmiento)                               August 5, 2024 @ 10:00 Am in person  with Quin Brownlee CNP  [address]  [place] [address]  Ph:  [phone]  Fax: [phone]     (Check email for paperwork for this appointment. Please complete prior to  appointment. If you need to cancel they require 48 hour advance notice to avoid a fee.)             Joseph Briceno MD

## 2024-07-29 NOTE — CARE PLAN
The patient's goals for the shift include good sleep    The clinical goals for the shift include maintian safety    Problem: Sensory Perceptual Alteration as Evidenced by  Goal: Cooperates with admission process  Outcome: Progressing  Goal: Patient/Family participate in treatment and discharge plans  Outcome: Progressing  Goal: Patient/Family verbalizes awareness of resources  Outcome: Progressing  Goal: Participates in unit activities  Outcome: Progressing  Goal: Discusses signs/symptoms of illness/treatment options  Outcome: Progressing  Goal: Initiates reality-based interactions  Outcome: Progressing  Goal: Able to discuss content of hallucinations/delusions  Outcome: Progressing  Goal: Notifies staff when experiencing hallucinations/delusions  Outcome: Progressing  Goal: Verbalizes reduction in hallucinations/delusions  Outcome: Progressing  Goal: Will not act on psychotic perception  Outcome: Progressing  Goal: Understands least restrictive measures  Outcome: Progressing  Goal: Free from restraint events  Outcome: Progressing     Over the shift, the patient did make progress toward the following goals.

## 2024-07-29 NOTE — NURSING NOTE
"Patient was assessed this morning at the table away from peers for safety. He was pleasant and engaged with conversation. He rates both anxiety and depression 0/10 with no SI/HI or AVH reported. His goal for today was \" getting out of here\". Patient identified strengths as \" I'm selfless and a leader\". He states \" deep breathing and reading as his coping skills\". Patient refused nicotine patch this morning and states \" I don't need it\". He has been both medication and group compliant. Pt has a new order to discharge today and Sergeant Menchaca will be here at 1PM. Will continue to monitor for safety and encourage group participation.  "

## 2024-07-29 NOTE — CARE PLAN
"The patient's goals for the shift include \" getting out of here\"    The clinical goals for the shift include maintain safety    Over the shift, the patient did  make progress toward the following goals of maintaining safety Barriers to progression include acute illness. Recommendations to address these barriers include continue medication as ordered.    "

## 2024-07-29 NOTE — CARE PLAN
Discussed with interdisciplianry team during rounds today.  Spoke with Sgt. Menchaca this morning. He reports that he has spoken with pt's father and his shotgun has been found and secured. The grandfather of ex GF also called both of pt's parents (they are ). Sgt. Spoke with pt about this over the weekend and he is aware. MD reports that pt is stable for dc today. Sgt. Menchaca will pick him up at 1300 today. Paper scripts so pt can fill either in [place] or in [place]. Awaiting to hear back from Barb re: OP follow up. Will also provide pt with community resources/mental health for his home community ([place]).  Reviewed dc plan with pt. DC summary emailed to both Barb RENTERIA and Sgt. Menchaca, and mingo.

## 2024-07-29 NOTE — GROUP NOTE
"Group Topic: Goals   Group Date: 7/29/2024  Start Time: 0730  End Time: 0805  Facilitators: Celso Segura CTRS   Department: [facility] VIRT[facility]    Number of Participants: 3   Group Focus: check in and goals  Treatment Modality: Recreation Therapy  Interventions utilized were: Setting Life Goals Handout, Thought for the Day Quote, support  Purpose: Goal Identification, self-care    Name: Gerry Dhaliwal YOB: 2005   MR: [id number]      Facilitator: Recreational Therapist  Level of Participation: active  Quality of Participation: appropriate/pleasant, cooperative, and engaged  Interactions with others: appropriate  Mood/Affect: appropriate  Triggers (if applicable): N/A  Cognition:  capable  Progress: Moderate  Comments: Patients were provided a handout that included different areas of life (family/friends, leisure lifestyle, work/school, spirituality, physical, and mental health) and encouraged to think about each area and answer questions. Participants were provided examples and assistance to complete the activity. Questions included: what I’m doing well, where I need improvement, and my goals.    Patient attended the entire session and completed all desired group tasks.  He discussed the importance of identifying barriers to issues with \"consistency\" and then \"finding ways to get around them\". Patient discussed with this CTRS his goals with wanting to be \"discharged, driving to [place] to help someone, and then driving to [place]\" in a 24hr period. Patient is discharged focus.     Plan: continue with services      "

## 2024-08-01 NOTE — SIGNIFICANT EVENT
Follow Up Phone Call    Outgoing phone call    Spoke to: Gerry Dhaliwal Relationship:self   Phone number: [phone]      Outcome: I left a message on answering machine   No chief complaint on file.         Diagnosis:Not applicable

## 2024-10-21 ENCOUNTER — HOSPITAL ENCOUNTER (INPATIENT)
Age: 19
LOS: 6 days | Discharge: HOME | DRG: 558 | End: 2024-10-28
Attending: STUDENT IN AN ORGANIZED HEALTH CARE EDUCATION/TRAINING PROGRAM | Admitting: INTERNAL MEDICINE
Payer: MEDICAID

## 2024-10-21 DIAGNOSIS — R52 BODY ACHES AFTER VACCINATION: ICD-10-CM

## 2024-10-21 DIAGNOSIS — R74.01 TRANSAMINITIS: ICD-10-CM

## 2024-10-21 DIAGNOSIS — M79.10 MUSCLE PAIN: ICD-10-CM

## 2024-10-21 DIAGNOSIS — T50.Z95A BODY ACHES AFTER VACCINATION: ICD-10-CM

## 2024-10-21 DIAGNOSIS — R74.8 ELEVATED CK: Primary | ICD-10-CM

## 2024-10-21 LAB
ALBUMIN SERPL-MCNC: 4.7 G/DL (ref 3.4–5)
ALBUMIN/GLOB SERPL: 1.8 {RATIO} (ref 1.1–2.2)
ALP SERPL-CCNC: 86 U/L (ref 40–129)
ALT SERPL-CCNC: 79 U/L (ref 10–40)
ANION GAP SERPL CALCULATED.3IONS-SCNC: 10 MMOL/L (ref 3–16)
AST SERPL-CCNC: 405 U/L (ref 15–37)
BASOPHILS # BLD: 0.1 K/UL (ref 0–0.2)
BASOPHILS NFR BLD: 1 %
BILIRUB SERPL-MCNC: 0.7 MG/DL (ref 0–1)
BILIRUB UR QL STRIP.AUTO: NEGATIVE
BUN SERPL-MCNC: 7 MG/DL (ref 7–20)
CALCIUM SERPL-MCNC: 9.6 MG/DL (ref 8.3–10.6)
CHLORIDE SERPL-SCNC: 104 MMOL/L (ref 99–110)
CK SERPL-CCNC: ABNORMAL U/L (ref 39–308)
CLARITY UR: CLEAR
CO2 SERPL-SCNC: 26 MMOL/L (ref 21–32)
COLOR UR: YELLOW
CREAT SERPL-MCNC: 1.1 MG/DL (ref 0.9–1.3)
DEPRECATED RDW RBC AUTO: 13 % (ref 12.4–15.4)
EOSINOPHIL # BLD: 0.3 K/UL (ref 0–0.6)
EOSINOPHIL NFR BLD: 3.4 %
FLUAV RNA RESP QL NAA+PROBE: NOT DETECTED
FLUBV RNA RESP QL NAA+PROBE: NOT DETECTED
GFR SERPLBLD CREATININE-BSD FMLA CKD-EPI: >90 ML/MIN/{1.73_M2}
GLUCOSE SERPL-MCNC: 92 MG/DL (ref 70–99)
GLUCOSE UR STRIP.AUTO-MCNC: NEGATIVE MG/DL
HCT VFR BLD AUTO: 44.6 % (ref 40.5–52.5)
HETEROPH AB BLD QL IA: NEGATIVE
HGB BLD-MCNC: 15.5 G/DL (ref 13.5–17.5)
HGB UR QL STRIP.AUTO: NEGATIVE
KETONES UR STRIP.AUTO-MCNC: NEGATIVE MG/DL
LEUKOCYTE ESTERASE UR QL STRIP.AUTO: NEGATIVE
LIPASE SERPL-CCNC: 18 U/L (ref 13–60)
LYMPHOCYTES # BLD: 1.9 K/UL (ref 1–5.1)
LYMPHOCYTES NFR BLD: 25.4 %
MCH RBC QN AUTO: 29.7 PG (ref 26–34)
MCHC RBC AUTO-ENTMCNC: 34.8 G/DL (ref 31–36)
MCV RBC AUTO: 85.5 FL (ref 80–100)
MONOCYTES # BLD: 0.7 K/UL (ref 0–1.3)
MONOCYTES NFR BLD: 9.2 %
NEUTROPHILS # BLD: 4.5 K/UL (ref 1.7–7.7)
NEUTROPHILS NFR BLD: 61 %
NITRITE UR QL STRIP.AUTO: NEGATIVE
PH UR STRIP.AUTO: 7.5 [PH] (ref 5–8)
PLATELET # BLD AUTO: 212 K/UL (ref 135–450)
PMV BLD AUTO: 9.1 FL (ref 5–10.5)
POTASSIUM SERPL-SCNC: 4.3 MMOL/L (ref 3.5–5.1)
PROT SERPL-MCNC: 7.3 G/DL (ref 6.4–8.2)
PROT UR STRIP.AUTO-MCNC: NEGATIVE MG/DL
RBC # BLD AUTO: 5.21 M/UL (ref 4.2–5.9)
SARS-COV-2 RNA RESP QL NAA+PROBE: NOT DETECTED
SODIUM SERPL-SCNC: 140 MMOL/L (ref 136–145)
SP GR UR STRIP.AUTO: 1.01 (ref 1–1.03)
UA COMPLETE W REFLEX CULTURE PNL UR: NORMAL
UA DIPSTICK W REFLEX MICRO PNL UR: NORMAL
URN SPEC COLLECT METH UR: NORMAL
UROBILINOGEN UR STRIP-ACNC: 1 E.U./DL
WBC # BLD AUTO: 7.4 K/UL (ref 4–11)

## 2024-10-21 PROCEDURE — 80307 DRUG TEST PRSMV CHEM ANLYZR: CPT

## 2024-10-21 PROCEDURE — 85025 COMPLETE CBC W/AUTO DIFF WBC: CPT

## 2024-10-21 PROCEDURE — 82550 ASSAY OF CK (CPK): CPT

## 2024-10-21 PROCEDURE — 83690 ASSAY OF LIPASE: CPT

## 2024-10-21 PROCEDURE — 86308 HETEROPHILE ANTIBODY SCREEN: CPT

## 2024-10-21 PROCEDURE — 36415 COLL VENOUS BLD VENIPUNCTURE: CPT

## 2024-10-21 PROCEDURE — 81003 URINALYSIS AUTO W/O SCOPE: CPT

## 2024-10-21 PROCEDURE — 87636 SARSCOV2 & INF A&B AMP PRB: CPT

## 2024-10-21 PROCEDURE — 93005 ELECTROCARDIOGRAM TRACING: CPT | Performed by: STUDENT IN AN ORGANIZED HEALTH CARE EDUCATION/TRAINING PROGRAM

## 2024-10-21 PROCEDURE — 84484 ASSAY OF TROPONIN QUANT: CPT

## 2024-10-21 PROCEDURE — 99285 EMERGENCY DEPT VISIT HI MDM: CPT

## 2024-10-21 PROCEDURE — 6370000000 HC RX 637 (ALT 250 FOR IP): Performed by: PHYSICIAN ASSISTANT

## 2024-10-21 PROCEDURE — 80053 COMPREHEN METABOLIC PANEL: CPT

## 2024-10-21 RX ORDER — ACETAMINOPHEN 500 MG
1000 TABLET ORAL ONCE
Status: COMPLETED | OUTPATIENT
Start: 2024-10-21 | End: 2024-10-21

## 2024-10-21 RX ORDER — IBUPROFEN 600 MG/1
600 TABLET, FILM COATED ORAL ONCE
Status: COMPLETED | OUTPATIENT
Start: 2024-10-21 | End: 2024-10-21

## 2024-10-21 RX ADMIN — ACETAMINOPHEN 1000 MG: 500 TABLET ORAL at 21:12

## 2024-10-21 RX ADMIN — IBUPROFEN 600 MG: 600 TABLET, FILM COATED ORAL at 21:12

## 2024-10-21 ASSESSMENT — PAIN - FUNCTIONAL ASSESSMENT: PAIN_FUNCTIONAL_ASSESSMENT: 0-10

## 2024-10-21 ASSESSMENT — PAIN SCALES - GENERAL: PAINLEVEL_OUTOF10: 4

## 2024-10-22 PROBLEM — M62.82 RHABDOMYOLYSIS: Status: ACTIVE | Noted: 2024-10-22

## 2024-10-22 LAB
AMPHETAMINES UR QL SCN>1000 NG/ML: NORMAL
BARBITURATES UR QL SCN>200 NG/ML: NORMAL
BENZODIAZ UR QL SCN>200 NG/ML: NORMAL
CANNABINOIDS UR QL SCN>50 NG/ML: NORMAL
COCAINE UR QL SCN: NORMAL
DRUG SCREEN COMMENT UR-IMP: NORMAL
EKG ATRIAL RATE: 55 BPM
EKG DIAGNOSIS: NORMAL
EKG P AXIS: 75 DEGREES
EKG P-R INTERVAL: 148 MS
EKG Q-T INTERVAL: 388 MS
EKG QRS DURATION: 84 MS
EKG QTC CALCULATION (BAZETT): 371 MS
EKG R AXIS: 69 DEGREES
EKG T AXIS: 47 DEGREES
EKG VENTRICULAR RATE: 55 BPM
FENTANYL SCREEN, URINE: NORMAL
METHADONE UR QL SCN>300 NG/ML: NORMAL
OPIATES UR QL SCN>300 NG/ML: NORMAL
OXYCODONE UR QL SCN: NORMAL
PCP UR QL SCN>25 NG/ML: NORMAL
PH UR STRIP: 7.5 [PH]
TROPONIN, HIGH SENSITIVITY: 9 NG/L (ref 0–22)

## 2024-10-22 PROCEDURE — 93010 ELECTROCARDIOGRAM REPORT: CPT | Performed by: INTERNAL MEDICINE

## 2024-10-22 PROCEDURE — 97535 SELF CARE MNGMENT TRAINING: CPT

## 2024-10-22 PROCEDURE — 2580000003 HC RX 258: Performed by: NURSE PRACTITIONER

## 2024-10-22 PROCEDURE — 82550 ASSAY OF CK (CPK): CPT

## 2024-10-22 PROCEDURE — 97530 THERAPEUTIC ACTIVITIES: CPT

## 2024-10-22 PROCEDURE — 6360000002 HC RX W HCPCS: Performed by: NURSE PRACTITIONER

## 2024-10-22 PROCEDURE — 36415 COLL VENOUS BLD VENIPUNCTURE: CPT

## 2024-10-22 PROCEDURE — 97165 OT EVAL LOW COMPLEX 30 MIN: CPT

## 2024-10-22 PROCEDURE — 2060000000 HC ICU INTERMEDIATE R&B

## 2024-10-22 PROCEDURE — 2580000003 HC RX 258: Performed by: INTERNAL MEDICINE

## 2024-10-22 RX ORDER — ONDANSETRON 4 MG/1
4 TABLET, ORALLY DISINTEGRATING ORAL EVERY 8 HOURS PRN
Status: DISCONTINUED | OUTPATIENT
Start: 2024-10-22 | End: 2024-10-28 | Stop reason: HOSPADM

## 2024-10-22 RX ORDER — ACETAMINOPHEN 325 MG/1
650 TABLET ORAL EVERY 6 HOURS PRN
Status: DISCONTINUED | OUTPATIENT
Start: 2024-10-22 | End: 2024-10-28 | Stop reason: HOSPADM

## 2024-10-22 RX ORDER — SODIUM CHLORIDE 0.9 % (FLUSH) 0.9 %
5-40 SYRINGE (ML) INJECTION EVERY 12 HOURS SCHEDULED
Status: DISCONTINUED | OUTPATIENT
Start: 2024-10-22 | End: 2024-10-28 | Stop reason: HOSPADM

## 2024-10-22 RX ORDER — SODIUM CHLORIDE 9 MG/ML
INJECTION, SOLUTION INTRAVENOUS PRN
Status: DISCONTINUED | OUTPATIENT
Start: 2024-10-22 | End: 2024-10-28 | Stop reason: HOSPADM

## 2024-10-22 RX ORDER — ONDANSETRON 2 MG/ML
4 INJECTION INTRAMUSCULAR; INTRAVENOUS EVERY 6 HOURS PRN
Status: DISCONTINUED | OUTPATIENT
Start: 2024-10-22 | End: 2024-10-28 | Stop reason: HOSPADM

## 2024-10-22 RX ORDER — POLYETHYLENE GLYCOL 3350 17 G/17G
17 POWDER, FOR SOLUTION ORAL DAILY PRN
Status: DISCONTINUED | OUTPATIENT
Start: 2024-10-22 | End: 2024-10-28 | Stop reason: HOSPADM

## 2024-10-22 RX ORDER — POTASSIUM CHLORIDE 1500 MG/1
40 TABLET, EXTENDED RELEASE ORAL PRN
Status: ACTIVE | OUTPATIENT
Start: 2024-10-22 | End: 2024-10-24

## 2024-10-22 RX ORDER — MAGNESIUM SULFATE IN WATER 40 MG/ML
2000 INJECTION, SOLUTION INTRAVENOUS PRN
Status: DISCONTINUED | OUTPATIENT
Start: 2024-10-22 | End: 2024-10-28 | Stop reason: HOSPADM

## 2024-10-22 RX ORDER — FLUOXETINE 40 MG/1
40 CAPSULE ORAL DAILY
Status: ON HOLD | COMMUNITY
End: 2024-10-28 | Stop reason: HOSPADM

## 2024-10-22 RX ORDER — ACETAMINOPHEN 650 MG/1
650 SUPPOSITORY RECTAL EVERY 6 HOURS PRN
Status: DISCONTINUED | OUTPATIENT
Start: 2024-10-22 | End: 2024-10-28 | Stop reason: HOSPADM

## 2024-10-22 RX ORDER — SODIUM CHLORIDE 9 MG/ML
INJECTION, SOLUTION INTRAVENOUS CONTINUOUS
Status: ACTIVE | OUTPATIENT
Start: 2024-10-22 | End: 2024-10-22

## 2024-10-22 RX ORDER — POTASSIUM CHLORIDE 7.45 MG/ML
10 INJECTION INTRAVENOUS PRN
Status: ACTIVE | OUTPATIENT
Start: 2024-10-22 | End: 2024-10-24

## 2024-10-22 RX ORDER — SODIUM CHLORIDE 0.9 % (FLUSH) 0.9 %
5-40 SYRINGE (ML) INJECTION PRN
Status: DISCONTINUED | OUTPATIENT
Start: 2024-10-22 | End: 2024-10-28 | Stop reason: HOSPADM

## 2024-10-22 RX ORDER — ENOXAPARIN SODIUM 100 MG/ML
40 INJECTION SUBCUTANEOUS DAILY
Status: DISCONTINUED | OUTPATIENT
Start: 2024-10-22 | End: 2024-10-22

## 2024-10-22 RX ORDER — 0.9 % SODIUM CHLORIDE 0.9 %
1000 INTRAVENOUS SOLUTION INTRAVENOUS ONCE
Status: COMPLETED | OUTPATIENT
Start: 2024-10-22 | End: 2024-10-22

## 2024-10-22 RX ADMIN — SODIUM CHLORIDE 1000 ML: 0.9 INJECTION, SOLUTION INTRAVENOUS at 01:18

## 2024-10-22 RX ADMIN — ENOXAPARIN SODIUM 40 MG: 100 INJECTION SUBCUTANEOUS at 09:46

## 2024-10-22 RX ADMIN — SODIUM CHLORIDE 150 ML/HR: 9 INJECTION, SOLUTION INTRAVENOUS at 18:22

## 2024-10-22 RX ADMIN — SODIUM CHLORIDE: 9 INJECTION, SOLUTION INTRAVENOUS at 04:08

## 2024-10-22 RX ADMIN — SODIUM CHLORIDE, PRESERVATIVE FREE 10 ML: 5 INJECTION INTRAVENOUS at 21:23

## 2024-10-22 ASSESSMENT — PAIN SCALES - GENERAL: PAINLEVEL_OUTOF10: 6

## 2024-10-22 NOTE — H&P
Pertinent positives and negatives discussed in HPI     Objective:   No intake or output data in the 24 hours ending 10/22/24 0056   Vitals:   Vitals:    10/21/24 2041   BP: 125/73   Pulse: 68   Resp: 16   Temp: 98.4 °F (36.9 °C)   TempSrc: Oral   SpO2: 100%   Weight: 63.5 kg (140 lb)   Height: 1.727 m (5' 8\")       Personally Reviewed Medications Prior to Admission     Prior to Admission medications    Medication Sig Start Date End Date Taking? Authorizing Provider   ondansetron (ZOFRAN) 4 MG tablet Take 1 tablet by mouth 3 times daily as needed for Nausea or Vomiting 3/8/24   Chuck Gamble PA-C       Physical Exam:    Physical Exam  Vitals and nursing note reviewed.   Constitutional:       Appearance: Normal appearance. He is not ill-appearing.   HENT:      Head: Normocephalic.      Nose: Nose normal.      Mouth/Throat:      Mouth: Mucous membranes are moist.   Eyes:      Pupils: Pupils are equal, round, and reactive to light.   Cardiovascular:      Rate and Rhythm: Normal rate and regular rhythm.      Pulses: Normal pulses.      Heart sounds: No murmur heard.  Pulmonary:      Effort: Pulmonary effort is normal. No respiratory distress.      Breath sounds: Normal breath sounds.   Abdominal:      General: Abdomen is flat. Bowel sounds are normal. There is no distension.      Palpations: Abdomen is soft.      Tenderness: There is no abdominal tenderness.   Musculoskeletal:         General: Normal range of motion.      Cervical back: Normal range of motion.      Right lower leg: No edema.      Left lower leg: No edema.      Comments: Strength 5/5 in all extremities    Skin:     General: Skin is warm and dry.      Capillary Refill: Capillary refill takes less than 2 seconds.   Neurological:      General: No focal deficit present.      Mental Status: He is alert and oriented to person, place, and time.      Cranial Nerves: No cranial nerve deficit.   Psychiatric:         Mood and Affect: Mood normal.

## 2024-10-22 NOTE — ED TRIAGE NOTES
Pt presents per triage with c/o bilateral arm pain and numbness. Tomorrow will be day 3 post getting a flu vaccine in the right arm.

## 2024-10-22 NOTE — CONSULTS
[facility]  Consultation Note    Reason for Consult: Rhabdomyolysis, concerns for myoglobinuric ATN  Requesting Physician:  Dr. LUCRETIA Mo    CHIEF COMPLAINT: Arm and leg pain    History obtained from records and patient.    HISTORY OF PRESENT ILLNESS:                Raffi Lux  is 19 y.o. y.o. male with no known significant past medical history who presents with arm and leg pain.  Had flu shot last Saturday and the next day started noticing alternating arm and leg pain.  He was undergoing drills for [employer] training.  On presentation to the hospital his CK was greater than 22,000.  He denies any regular alcohol use or any drug abuse.  No discoloration of urine.  No vomiting or diarrhea.  No family history of muscular disorders.  No over-the-counter supplements or medications.    Past Medical History:     has a past medical history of No pertinent past medical history.   Past Surgical History:     has a past surgical history that includes [place] tooth extraction.   Current Medications:    Current Facility-Administered Medications: sodium chloride flush 0.9 % injection 5-40 mL, 5-40 mL, IntraVENous, 2 times per day  sodium chloride flush 0.9 % injection 5-40 mL, 5-40 mL, IntraVENous, PRN  0.9 % sodium chloride infusion, , IntraVENous, PRN  potassium chloride (KLOR-CON M) extended release tablet 40 mEq, 40 mEq, Oral, PRN **OR** potassium bicarb-citric acid (EFFER-K) effervescent tablet 40 mEq, 40 mEq, Oral, PRN **OR** potassium chloride 10 mEq/100 mL IVPB (Peripheral Line), 10 mEq, IntraVENous, PRN  magnesium sulfate 2000 mg in 50 mL IVPB premix, 2,000 mg, IntraVENous, PRN  enoxaparin (LOVENOX) injection 40 mg, 40 mg, SubCUTAneous, Daily  ondansetron (ZOFRAN-ODT) disintegrating tablet 4 mg, 4 mg, Oral, Q8H PRN **OR** ondansetron (ZOFRAN) injection 4 mg, 4 mg, IntraVENous, Q6H PRN  polyethylene glycol (GLYCOLAX) packet 17 g, 17 g, Oral, Daily PRN  acetaminophen (TYLENOL) tablet

## 2024-10-22 NOTE — ED PROVIDER NOTES
EMERGENCY DEPARTMENT PROVIDER NOTE         PATIENT IDENTIFICATION     Name:   Raffi Lux  MRN:   [id number]  YOB: 2005  Date of Evaluation:   10/21/2024  Provider:   GABI Warner; Cristopher Lackey DO  PCP:   No primary care provider on file.        CHIEF COMPLAINT       Arm Pain        HISTORY OF PRESENT ILLNESS     I independently interviewed patient and/or caretaker(s).  See Advanced Practice Provider (REYNA) note for full HPI.  In summary, Raffi Lux  is a(n) 19 y.o. male who presents with bilateral arm pain and numbness about 3 days after getting the flu vaccine in the right arm.  States pain started in the right arm, but since has spread to the left as well.  Affirms generalized myalgia as well.  Denies history of similar symptoms in the past vaccinations.  Denies rash or any other symptoms.  Denies any trauma or significant exertion.  Patient denies use of alcohol, tobacco, and all the recreational drugs including cocaine.        PHYSICAL EXAM     I performed an independent physical exam and agree with findings in REYNA note.  Overall well-appearing young male with minimal tenderness to palpation of the bilateral pectoral musculature and trapezius musculature.          EKG INTERPRETATION     TIME:   2352  RATE:   55 bpm  TX INTERVAL:   148 ms  QRS DURATION:   84 ms  QT/QTc:   388/371 ms  RHYTHM:    Sinus bradycardia  AXIS:   Regular  ABNORMALITIES  No STEMI  No ischemic changes    PRIOR EKG:   No EKG on record    INTERPRETATION:  Sinus bradycardia, otherwise normal EKG    REVIEWED BY:   Cristopher Lackey Jr., DO    EKG was independently reviewed by emergency department physician in absence of cardiologist.        LAB RESULTS     Results for orders placed or performed during the hospital encounter of 10/21/24   COVID-19 & Influenza Combo    Specimen: Nasopharyngeal Swab   Result Value Ref Range    SARS-CoV-2 RNA, RT PCR NOT DETECTED NOT DETECTED    Influenza A NOT DETECTED NOT DETECTED

## 2024-10-22 NOTE — ED PROVIDER NOTES
[facility] EMERGENCY DEPARTMENT  EMERGENCY DEPARTMENT ENCOUNTER        Pt Name: Raffi Lux  MRN: [id number]  Birthdate 2005  Date of evaluation: 10/21/2024  Provider: Vivian Alba PA-C  PCP: No primary care provider on file.  Note Started: 1:06 AM EDT 10/22/24       I have seen and evaluated this patient with my supervising physician Cristopher Lackey DO.      CHIEF COMPLAINT       Chief Complaint   Patient presents with    Arm Pain       HISTORY OF PRESENT ILLNESS: 1 or more Elements     History From: Patient  Limitations to history : None    Raffi Lux is a 19 y.o. male who presents to the emergency department today for evaluation for concerns of arm pain.  The patient reports that he is in [employer], and he reports that he got a flu shot on Friday.  The patient reports that he noticed some soreness on Friday which he expected.  The patient reports that on Saturday when he woke up he noticed various pains to his arms and legs I thought that this was due to body aches from the shot itself.  The patient reports that on Sunday, yesterday, he noticed some pain to his left arm as well as to his right arm.  He reports that his symptoms persisted today which prompted his visit to the ED.  The patient states that he did noticed a \"tingling\" sensation to both of his arms however denies any numbness, feeling or weakness at this time.  He reports he never had any true numbness.  He has no chest pain or shortness of breath.  He has no fever or chills.  No recent travels.  No history of DVT or PE.  He denies any abdominal pain, nausea, vomiting or diarrhea.  No urinary symptoms.  No alcohol use or drug use    Nursing Notes were all reviewed and agreed with or any disagreements were addressed in the HPI.    REVIEW OF SYSTEMS :      Review of Systems   Constitutional:  Negative for activity change, appetite change, chills and fever.   HENT:  Negative for congestion and rhinorrhea.

## 2024-10-23 LAB
ALBUMIN SERPL-MCNC: 3.6 G/DL (ref 3.4–5)
ALBUMIN/GLOB SERPL: 1.6 {RATIO} (ref 1.1–2.2)
ALP SERPL-CCNC: 77 U/L (ref 40–129)
ALT SERPL-CCNC: 87 U/L (ref 10–40)
ANION GAP SERPL CALCULATED.3IONS-SCNC: 10 MMOL/L (ref 3–16)
AST SERPL-CCNC: 364 U/L (ref 15–37)
BASOPHILS # BLD: 0.1 K/UL (ref 0–0.2)
BASOPHILS NFR BLD: 0.9 %
BILIRUB SERPL-MCNC: 0.3 MG/DL (ref 0–1)
BUN SERPL-MCNC: 8 MG/DL (ref 7–20)
CALCIUM SERPL-MCNC: 8.5 MG/DL (ref 8.3–10.6)
CHLORIDE SERPL-SCNC: 108 MMOL/L (ref 99–110)
CK SERPL-CCNC: ABNORMAL U/L (ref 39–308)
CK SERPL-CCNC: ABNORMAL U/L (ref 39–308)
CO2 SERPL-SCNC: 22 MMOL/L (ref 21–32)
CREAT SERPL-MCNC: 0.9 MG/DL (ref 0.9–1.3)
CRP SERPL-MCNC: 3.4 MG/L (ref 0–5.1)
DEPRECATED RDW RBC AUTO: 12.9 % (ref 12.4–15.4)
EOSINOPHIL # BLD: 0.3 K/UL (ref 0–0.6)
EOSINOPHIL NFR BLD: 4.2 %
ERYTHROCYTE [SEDIMENTATION RATE] IN BLOOD BY WESTERGREN METHOD: 3 MM/HR (ref 0–15)
GFR SERPLBLD CREATININE-BSD FMLA CKD-EPI: >90 ML/MIN/{1.73_M2}
GLUCOSE SERPL-MCNC: 94 MG/DL (ref 70–99)
HCT VFR BLD AUTO: 40.9 % (ref 40.5–52.5)
HGB BLD-MCNC: 13.7 G/DL (ref 13.5–17.5)
LYMPHOCYTES # BLD: 2.2 K/UL (ref 1–5.1)
LYMPHOCYTES NFR BLD: 32.7 %
MAGNESIUM SERPL-MCNC: 1.79 MG/DL (ref 1.8–2.4)
MCH RBC QN AUTO: 29 PG (ref 26–34)
MCHC RBC AUTO-ENTMCNC: 33.6 G/DL (ref 31–36)
MCV RBC AUTO: 86.3 FL (ref 80–100)
MONOCYTES # BLD: 0.5 K/UL (ref 0–1.3)
MONOCYTES NFR BLD: 7.1 %
NEUTROPHILS # BLD: 3.7 K/UL (ref 1.7–7.7)
NEUTROPHILS NFR BLD: 55.1 %
PLATELET # BLD AUTO: 183 K/UL (ref 135–450)
PMV BLD AUTO: 9.4 FL (ref 5–10.5)
POTASSIUM SERPL-SCNC: 3.8 MMOL/L (ref 3.5–5.1)
PROT SERPL-MCNC: 5.9 G/DL (ref 6.4–8.2)
RBC # BLD AUTO: 4.74 M/UL (ref 4.2–5.9)
SODIUM SERPL-SCNC: 140 MMOL/L (ref 136–145)
T4 FREE SERPL-MCNC: 1.3 NG/DL (ref 0.9–1.8)
TSH SERPL DL<=0.005 MIU/L-ACNC: 4.68 UIU/ML (ref 0.27–4.2)
WBC # BLD AUTO: 6.6 K/UL (ref 4–11)

## 2024-10-23 PROCEDURE — 2580000003 HC RX 258: Performed by: NURSE PRACTITIONER

## 2024-10-23 PROCEDURE — 6370000000 HC RX 637 (ALT 250 FOR IP): Performed by: NURSE PRACTITIONER

## 2024-10-23 PROCEDURE — 82085 ASSAY OF ALDOLASE: CPT

## 2024-10-23 PROCEDURE — 85652 RBC SED RATE AUTOMATED: CPT

## 2024-10-23 PROCEDURE — 86140 C-REACTIVE PROTEIN: CPT

## 2024-10-23 PROCEDURE — 85025 COMPLETE CBC W/AUTO DIFF WBC: CPT

## 2024-10-23 PROCEDURE — 84443 ASSAY THYROID STIM HORMONE: CPT

## 2024-10-23 PROCEDURE — 2580000003 HC RX 258: Performed by: INTERNAL MEDICINE

## 2024-10-23 PROCEDURE — 84439 ASSAY OF FREE THYROXINE: CPT

## 2024-10-23 PROCEDURE — 82550 ASSAY OF CK (CPK): CPT

## 2024-10-23 PROCEDURE — 2060000000 HC ICU INTERMEDIATE R&B

## 2024-10-23 PROCEDURE — 83735 ASSAY OF MAGNESIUM: CPT

## 2024-10-23 PROCEDURE — 80053 COMPREHEN METABOLIC PANEL: CPT

## 2024-10-23 PROCEDURE — 36415 COLL VENOUS BLD VENIPUNCTURE: CPT

## 2024-10-23 PROCEDURE — 6360000002 HC RX W HCPCS: Performed by: INTERNAL MEDICINE

## 2024-10-23 RX ORDER — MAGNESIUM SULFATE 1 G/100ML
1000 INJECTION INTRAVENOUS ONCE
Status: COMPLETED | OUTPATIENT
Start: 2024-10-23 | End: 2024-10-23

## 2024-10-23 RX ORDER — SODIUM CHLORIDE 9 MG/ML
INJECTION, SOLUTION INTRAVENOUS CONTINUOUS
Status: DISCONTINUED | OUTPATIENT
Start: 2024-10-23 | End: 2024-10-28 | Stop reason: HOSPADM

## 2024-10-23 RX ADMIN — SODIUM CHLORIDE: 9 INJECTION, SOLUTION INTRAVENOUS at 21:41

## 2024-10-23 RX ADMIN — SODIUM CHLORIDE: 9 INJECTION, SOLUTION INTRAVENOUS at 09:30

## 2024-10-23 RX ADMIN — SODIUM CHLORIDE: 9 INJECTION, SOLUTION INTRAVENOUS at 16:03

## 2024-10-23 RX ADMIN — ACETAMINOPHEN 650 MG: 325 TABLET ORAL at 13:34

## 2024-10-23 RX ADMIN — MAGNESIUM SULFATE HEPTAHYDRATE 1000 MG: 1 INJECTION, SOLUTION INTRAVENOUS at 10:13

## 2024-10-23 RX ADMIN — SODIUM CHLORIDE: 9 INJECTION, SOLUTION INTRAVENOUS at 00:52

## 2024-10-23 RX ADMIN — SODIUM CHLORIDE, PRESERVATIVE FREE 10 ML: 5 INJECTION INTRAVENOUS at 21:41

## 2024-10-23 ASSESSMENT — PAIN SCALES - GENERAL
PAINLEVEL_OUTOF10: 3
PAINLEVEL_OUTOF10: 3

## 2024-10-23 ASSESSMENT — PAIN DESCRIPTION - LOCATION: LOCATION: HEAD

## 2024-10-23 NOTE — PLAN OF CARE
Problem: Discharge Planning  Goal: Discharge to home or other facility with appropriate resources  10/23/2024 1447 by Shruthi Dye, RN  Outcome: Progressing  10/23/2024 0505 by Meghana Rausch, RN  Outcome: Progressing     Problem: Pain  Goal: Verbalizes/displays adequate comfort level or baseline comfort level  10/23/2024 1447 by Shruthi Dye, RN  Outcome: Progressing  10/23/2024 0505 by Meghana Rausch, RN  Outcome: Progressing     Problem: Musculoskeletal - Adult  Goal: Return mobility to safest level of function  10/23/2024 1447 by Shruthi Dye, RN  Outcome: Progressing  10/23/2024 0505 by Meghana Rausch, RN  Outcome: Progressing     Problem: Safety - Adult  Goal: Free from fall injury  10/23/2024 1447 by Shruthi Dye, RN  Outcome: Progressing  10/23/2024 0505 by Meghana Rausch, RN  Outcome: Progressing

## 2024-10-23 NOTE — PLAN OF CARE
Problem: Discharge Planning  Goal: Discharge to home or other facility with appropriate resources  Outcome: Progressing     Problem: Pain  Goal: Verbalizes/displays adequate comfort level or baseline comfort level  Outcome: Progressing     Problem: Musculoskeletal - Adult  Goal: Return mobility to safest level of function  Outcome: Progressing     Problem: Safety - Adult  Goal: Free from fall injury  Outcome: Progressing

## 2024-10-24 LAB
ALBUMIN SERPL-MCNC: 4 G/DL (ref 3.4–5)
ALP SERPL-CCNC: 68 U/L (ref 40–129)
ALT SERPL-CCNC: 92 U/L (ref 10–40)
ANA SER QL IA: NEGATIVE
ANION GAP SERPL CALCULATED.3IONS-SCNC: 14 MMOL/L (ref 3–16)
AST SERPL-CCNC: 322 U/L (ref 15–37)
BILIRUB DIRECT SERPL-MCNC: <0.1 MG/DL (ref 0–0.3)
BILIRUB INDIRECT SERPL-MCNC: 0.2 MG/DL (ref 0–1)
BILIRUB SERPL-MCNC: 0.3 MG/DL (ref 0–1)
BUN SERPL-MCNC: 5 MG/DL (ref 7–20)
CALCIUM SERPL-MCNC: 8.7 MG/DL (ref 8.3–10.6)
CHLORIDE SERPL-SCNC: 107 MMOL/L (ref 99–110)
CK SERPL-CCNC: ABNORMAL U/L (ref 39–308)
CO2 SERPL-SCNC: 19 MMOL/L (ref 21–32)
CREAT SERPL-MCNC: 0.8 MG/DL (ref 0.9–1.3)
GFR SERPLBLD CREATININE-BSD FMLA CKD-EPI: >90 ML/MIN/{1.73_M2}
GLUCOSE SERPL-MCNC: 81 MG/DL (ref 70–99)
HIV 1+2 AB+HIV1 P24 AG SERPL QL IA: NORMAL
HIV 2 AB SERPL QL IA: NORMAL
HIV1 AB SERPL QL IA: NORMAL
HIV1 P24 AG SERPL QL IA: NORMAL
POTASSIUM SERPL-SCNC: 3.9 MMOL/L (ref 3.5–5.1)
PROT SERPL-MCNC: 6.2 G/DL (ref 6.4–8.2)
SODIUM SERPL-SCNC: 140 MMOL/L (ref 136–145)

## 2024-10-24 PROCEDURE — 2580000003 HC RX 258: Performed by: INTERNAL MEDICINE

## 2024-10-24 PROCEDURE — 2060000000 HC ICU INTERMEDIATE R&B

## 2024-10-24 PROCEDURE — 80048 BASIC METABOLIC PNL TOTAL CA: CPT

## 2024-10-24 PROCEDURE — 80076 HEPATIC FUNCTION PANEL: CPT

## 2024-10-24 PROCEDURE — 6370000000 HC RX 637 (ALT 250 FOR IP): Performed by: NURSE PRACTITIONER

## 2024-10-24 PROCEDURE — 87390 HIV-1 AG IA: CPT

## 2024-10-24 PROCEDURE — 86701 HIV-1ANTIBODY: CPT

## 2024-10-24 PROCEDURE — 82550 ASSAY OF CK (CPK): CPT

## 2024-10-24 PROCEDURE — 36415 COLL VENOUS BLD VENIPUNCTURE: CPT

## 2024-10-24 PROCEDURE — 86702 HIV-2 ANTIBODY: CPT

## 2024-10-24 PROCEDURE — 86038 ANTINUCLEAR ANTIBODIES: CPT

## 2024-10-24 RX ADMIN — SODIUM CHLORIDE: 9 INJECTION, SOLUTION INTRAVENOUS at 11:48

## 2024-10-24 RX ADMIN — SODIUM CHLORIDE: 9 INJECTION, SOLUTION INTRAVENOUS at 05:05

## 2024-10-24 RX ADMIN — ACETAMINOPHEN 650 MG: 325 TABLET ORAL at 05:02

## 2024-10-24 RX ADMIN — ACETAMINOPHEN 650 MG: 325 TABLET ORAL at 13:08

## 2024-10-24 ASSESSMENT — PAIN SCALES - GENERAL
PAINLEVEL_OUTOF10: 5
PAINLEVEL_OUTOF10: 8

## 2024-10-24 ASSESSMENT — PAIN DESCRIPTION - LOCATION
LOCATION: HEAD
LOCATION: HEAD

## 2024-10-24 ASSESSMENT — PAIN DESCRIPTION - ORIENTATION: ORIENTATION: ANTERIOR

## 2024-10-24 ASSESSMENT — PAIN - FUNCTIONAL ASSESSMENT: PAIN_FUNCTIONAL_ASSESSMENT: ACTIVITIES ARE NOT PREVENTED

## 2024-10-24 ASSESSMENT — PAIN DESCRIPTION - DESCRIPTORS: DESCRIPTORS: ACHING

## 2024-10-25 LAB
ALBUMIN SERPL-MCNC: 4 G/DL (ref 3.4–5)
ALBUMIN/GLOB SERPL: 1.7 {RATIO} (ref 1.1–2.2)
ALDOLASE SERPL-CCNC: 258.3 U/L (ref 1.2–7.6)
ALP SERPL-CCNC: 72 U/L (ref 40–129)
ALT SERPL-CCNC: 90 U/L (ref 10–40)
ANION GAP SERPL CALCULATED.3IONS-SCNC: 10 MMOL/L (ref 3–16)
AST SERPL-CCNC: 229 U/L (ref 15–37)
BASE EXCESS BLDV CALC-SCNC: -1.4 MMOL/L (ref -3–3)
BILIRUB SERPL-MCNC: 0.3 MG/DL (ref 0–1)
BUN SERPL-MCNC: 8 MG/DL (ref 7–20)
CALCIUM SERPL-MCNC: 9 MG/DL (ref 8.3–10.6)
CHLORIDE SERPL-SCNC: 108 MMOL/L (ref 99–110)
CK SERPL-CCNC: ABNORMAL U/L (ref 39–308)
CO2 BLDV-SCNC: 57 MMOL/L
CO2 SERPL-SCNC: 23 MMOL/L (ref 21–32)
COHGB MFR BLDV: 3.2 % (ref 0–1.5)
CREAT SERPL-MCNC: 0.9 MG/DL (ref 0.9–1.3)
DEPRECATED RDW RBC AUTO: 12.9 % (ref 12.4–15.4)
GFR SERPLBLD CREATININE-BSD FMLA CKD-EPI: >90 ML/MIN/{1.73_M2}
GLUCOSE SERPL-MCNC: 89 MG/DL (ref 70–99)
HCO3 BLDV-SCNC: 24.3 MMOL/L (ref 23–29)
HCT VFR BLD AUTO: 42.3 % (ref 40.5–52.5)
HGB BLD-MCNC: 14.3 G/DL (ref 13.5–17.5)
MAGNESIUM SERPL-MCNC: 1.81 MG/DL (ref 1.8–2.4)
MCH RBC QN AUTO: 28.7 PG (ref 26–34)
MCHC RBC AUTO-ENTMCNC: 33.7 G/DL (ref 31–36)
MCV RBC AUTO: 85 FL (ref 80–100)
METHGB MFR BLDV: 0.4 %
O2 CT VFR BLDV CALC: 20 VOL %
O2 THERAPY: ABNORMAL
PCO2 BLDV: 43.5 MMHG (ref 40–50)
PH BLDV: 7.36 [PH] (ref 7.35–7.45)
PHOSPHATE SERPL-MCNC: 4.4 MG/DL (ref 2.5–4.9)
PLATELET # BLD AUTO: 171 K/UL (ref 135–450)
PMV BLD AUTO: 9 FL (ref 5–10.5)
PO2 BLDV: 113 MMHG (ref 25–40)
POTASSIUM SERPL-SCNC: 3.9 MMOL/L (ref 3.5–5.1)
PROT SERPL-MCNC: 6.3 G/DL (ref 6.4–8.2)
RBC # BLD AUTO: 4.98 M/UL (ref 4.2–5.9)
SAO2 % BLDV: 99 %
SODIUM SERPL-SCNC: 141 MMOL/L (ref 136–145)
WBC # BLD AUTO: 7.3 K/UL (ref 4–11)

## 2024-10-25 PROCEDURE — 6370000000 HC RX 637 (ALT 250 FOR IP): Performed by: NURSE PRACTITIONER

## 2024-10-25 PROCEDURE — 2060000000 HC ICU INTERMEDIATE R&B

## 2024-10-25 PROCEDURE — 82550 ASSAY OF CK (CPK): CPT

## 2024-10-25 PROCEDURE — 36415 COLL VENOUS BLD VENIPUNCTURE: CPT

## 2024-10-25 PROCEDURE — 80053 COMPREHEN METABOLIC PANEL: CPT

## 2024-10-25 PROCEDURE — 83735 ASSAY OF MAGNESIUM: CPT

## 2024-10-25 PROCEDURE — 2580000003 HC RX 258: Performed by: INTERNAL MEDICINE

## 2024-10-25 PROCEDURE — 2580000003 HC RX 258: Performed by: NURSE PRACTITIONER

## 2024-10-25 PROCEDURE — 82803 BLOOD GASES ANY COMBINATION: CPT

## 2024-10-25 PROCEDURE — 85027 COMPLETE CBC AUTOMATED: CPT

## 2024-10-25 PROCEDURE — 84100 ASSAY OF PHOSPHORUS: CPT

## 2024-10-25 RX ADMIN — SODIUM CHLORIDE, PRESERVATIVE FREE 10 ML: 5 INJECTION INTRAVENOUS at 10:40

## 2024-10-25 RX ADMIN — SODIUM CHLORIDE: 9 INJECTION, SOLUTION INTRAVENOUS at 07:33

## 2024-10-25 RX ADMIN — SODIUM CHLORIDE: 9 INJECTION, SOLUTION INTRAVENOUS at 14:15

## 2024-10-25 RX ADMIN — SODIUM CHLORIDE: 9 INJECTION, SOLUTION INTRAVENOUS at 20:16

## 2024-10-25 RX ADMIN — ACETAMINOPHEN 650 MG: 325 TABLET ORAL at 14:20

## 2024-10-25 ASSESSMENT — PAIN DESCRIPTION - LOCATION: LOCATION: HEAD;NECK

## 2024-10-25 ASSESSMENT — PAIN SCALES - GENERAL
PAINLEVEL_OUTOF10: 4
PAINLEVEL_OUTOF10: 0

## 2024-10-25 NOTE — PLAN OF CARE
Problem: Pain  Goal: Verbalizes/displays adequate comfort level or baseline comfort level  10/25/2024 1050 by Trudy Velez, RN  Outcome: Progressing  Note: Patient with c/o mild discomfort to bilateral arms.  Declines need for intervention at this time.  Will continue to monitor.     Problem: Safety - Adult  Goal: Free from fall injury  10/25/2024 1050 by Trudy Velez, RN  Outcome: Progressing  Note: Patient remains absent from falls at this time.  Remains alert and oriented, in bed with call light and belongings in reach.  Non-slip footwear on and 2/4 siderails raised.  Bed remains in lowest/locked position at all times.  Fall precautions in place.  Patient encouraged to use call light to request assistance, v/u.  Will continue to monitor.     Problem: Cardiovascular - Adult  Goal: Maintains optimal cardiac output and hemodynamic stability  10/25/2024 1050 by Trudy Velez, RN  Outcome: Progressing  Note: Patient VSS at this time on room air.  Remains on NS @ 150 ml/hr per orders - see MAR.  Will continue to monitor.

## 2024-10-25 NOTE — PLAN OF CARE
Problem: Discharge Planning  Goal: Discharge to home or other facility with appropriate resources  Outcome: Progressing  Flowsheets (Taken 10/24/2024 2047)  Discharge to home or other facility with appropriate resources: Identify barriers to discharge with patient and caregiver     Problem: Pain  Goal: Verbalizes/displays adequate comfort level or baseline comfort level  Outcome: Progressing     Problem: Musculoskeletal - Adult  Goal: Return mobility to safest level of function  Outcome: Progressing  Flowsheets (Taken 10/24/2024 2047)  Return Mobility to Safest Level of Function: Assess patient stability and activity tolerance for standing, transferring and ambulating with or without assistive devices     Problem: Safety - Adult  Goal: Free from fall injury  Outcome: Progressing     Problem: Cardiovascular - Adult  Goal: Maintains optimal cardiac output and hemodynamic stability  Outcome: Progressing     Problem: Skin/Tissue Integrity - Adult  Goal: Skin integrity remains intact  Outcome: Progressing

## 2024-10-26 LAB
ALBUMIN SERPL-MCNC: 4 G/DL (ref 3.4–5)
ALBUMIN/GLOB SERPL: 1.7 {RATIO} (ref 1.1–2.2)
ALP SERPL-CCNC: 76 U/L (ref 40–129)
ALT SERPL-CCNC: 79 U/L (ref 10–40)
ANION GAP SERPL CALCULATED.3IONS-SCNC: 8 MMOL/L (ref 3–16)
AST SERPL-CCNC: 136 U/L (ref 15–37)
BILIRUB SERPL-MCNC: 0.3 MG/DL (ref 0–1)
BUN SERPL-MCNC: 9 MG/DL (ref 7–20)
CALCIUM SERPL-MCNC: 9.1 MG/DL (ref 8.3–10.6)
CHLORIDE SERPL-SCNC: 107 MMOL/L (ref 99–110)
CK SERPL-CCNC: 7577 U/L (ref 39–308)
CO2 SERPL-SCNC: 25 MMOL/L (ref 21–32)
CREAT SERPL-MCNC: 0.9 MG/DL (ref 0.9–1.3)
DEPRECATED RDW RBC AUTO: 13 % (ref 12.4–15.4)
GFR SERPLBLD CREATININE-BSD FMLA CKD-EPI: >90 ML/MIN/{1.73_M2}
GLUCOSE SERPL-MCNC: 86 MG/DL (ref 70–99)
HCT VFR BLD AUTO: 41.7 % (ref 40.5–52.5)
HGB BLD-MCNC: 14.7 G/DL (ref 13.5–17.5)
MCH RBC QN AUTO: 29.7 PG (ref 26–34)
MCHC RBC AUTO-ENTMCNC: 35.3 G/DL (ref 31–36)
MCV RBC AUTO: 84.3 FL (ref 80–100)
PLATELET # BLD AUTO: 187 K/UL (ref 135–450)
PMV BLD AUTO: 9 FL (ref 5–10.5)
POTASSIUM SERPL-SCNC: 4.1 MMOL/L (ref 3.5–5.1)
PROT SERPL-MCNC: 6.4 G/DL (ref 6.4–8.2)
RBC # BLD AUTO: 4.95 M/UL (ref 4.2–5.9)
SODIUM SERPL-SCNC: 140 MMOL/L (ref 136–145)
WBC # BLD AUTO: 6.5 K/UL (ref 4–11)

## 2024-10-26 PROCEDURE — 36415 COLL VENOUS BLD VENIPUNCTURE: CPT

## 2024-10-26 PROCEDURE — 2580000003 HC RX 258: Performed by: INTERNAL MEDICINE

## 2024-10-26 PROCEDURE — 82550 ASSAY OF CK (CPK): CPT

## 2024-10-26 PROCEDURE — 2580000003 HC RX 258: Performed by: NURSE PRACTITIONER

## 2024-10-26 PROCEDURE — 2060000000 HC ICU INTERMEDIATE R&B

## 2024-10-26 PROCEDURE — 80053 COMPREHEN METABOLIC PANEL: CPT

## 2024-10-26 PROCEDURE — 85027 COMPLETE CBC AUTOMATED: CPT

## 2024-10-26 RX ADMIN — SODIUM CHLORIDE: 9 INJECTION, SOLUTION INTRAVENOUS at 02:45

## 2024-10-26 RX ADMIN — SODIUM CHLORIDE: 9 INJECTION, SOLUTION INTRAVENOUS at 16:38

## 2024-10-26 RX ADMIN — SODIUM CHLORIDE, PRESERVATIVE FREE 10 ML: 5 INJECTION INTRAVENOUS at 20:50

## 2024-10-26 RX ADMIN — SODIUM CHLORIDE, PRESERVATIVE FREE 10 ML: 5 INJECTION INTRAVENOUS at 18:10

## 2024-10-26 RX ADMIN — SODIUM CHLORIDE: 9 INJECTION, SOLUTION INTRAVENOUS at 10:15

## 2024-10-26 NOTE — PLAN OF CARE
Problem: Pain  Goal: Verbalizes/displays adequate comfort level or baseline comfort level  10/26/2024 1040 by Trudy Velez, RN  Outcome: Progressing  Note: Patient denies any pain at this time.  Will continue to monitor.     Problem: Safety - Adult  Goal: Free from fall injury  10/26/2024 1040 by Trudy Velez, RN  Outcome: Progressing  Note: Patient remains absent from falls at this time.  Remains in bed with eyes closed, call light and belongings in reach.  Non-slip footwear on and 2/4 siderails raised.  Bed remains in lowest/locked position at all times.  Fall precautions in place.  Patient encouraged to use call light to request assistance, v/u.  Will continue to monitor.     Problem: Cardiovascular - Adult  Goal: Maintains optimal cardiac output and hemodynamic stability  Outcome: Progressing  Note: Patient VSS at this time on room air.  Will continue to monitor.

## 2024-10-26 NOTE — PLAN OF CARE
Problem: Discharge Planning  Goal: Discharge to home or other facility with appropriate resources  Outcome: Progressing  Flowsheets (Taken 10/26/2024 0549)  Discharge to home or other facility with appropriate resources:   Identify barriers to discharge with patient and caregiver   Identify discharge learning needs (meds, wound care, etc)     Problem: Pain  Goal: Verbalizes/displays adequate comfort level or baseline comfort level  Outcome: Progressing  Flowsheets (Taken 10/26/2024 0549)  Verbalizes/displays adequate comfort level or baseline comfort level:   Encourage patient to monitor pain and request assistance   Assess pain using appropriate pain scale     Problem: Safety - Adult  Goal: Free from fall injury  Outcome: Progressing  Flowsheets (Taken 10/26/2024 0549)  Free From Fall Injury: Instruct family/caregiver on patient safety

## 2024-10-27 LAB
ALBUMIN SERPL-MCNC: 3.9 G/DL (ref 3.4–5)
ALBUMIN/GLOB SERPL: 1.7 {RATIO} (ref 1.1–2.2)
ALP SERPL-CCNC: 64 U/L (ref 40–129)
ALT SERPL-CCNC: 68 U/L (ref 10–40)
ANION GAP SERPL CALCULATED.3IONS-SCNC: 10 MMOL/L (ref 3–16)
AST SERPL-CCNC: 87 U/L (ref 15–37)
BILIRUB SERPL-MCNC: 0.4 MG/DL (ref 0–1)
BUN SERPL-MCNC: 9 MG/DL (ref 7–20)
CALCIUM SERPL-MCNC: 9.2 MG/DL (ref 8.3–10.6)
CHLORIDE SERPL-SCNC: 106 MMOL/L (ref 99–110)
CK SERPL-CCNC: 3649 U/L (ref 39–308)
CO2 SERPL-SCNC: 24 MMOL/L (ref 21–32)
CREAT SERPL-MCNC: 0.9 MG/DL (ref 0.9–1.3)
GFR SERPLBLD CREATININE-BSD FMLA CKD-EPI: >90 ML/MIN/{1.73_M2}
GLUCOSE SERPL-MCNC: 90 MG/DL (ref 70–99)
POTASSIUM SERPL-SCNC: 3.7 MMOL/L (ref 3.5–5.1)
PROT SERPL-MCNC: 6.2 G/DL (ref 6.4–8.2)
SODIUM SERPL-SCNC: 140 MMOL/L (ref 136–145)

## 2024-10-27 PROCEDURE — 36415 COLL VENOUS BLD VENIPUNCTURE: CPT

## 2024-10-27 PROCEDURE — 2580000003 HC RX 258: Performed by: NURSE PRACTITIONER

## 2024-10-27 PROCEDURE — 80053 COMPREHEN METABOLIC PANEL: CPT

## 2024-10-27 PROCEDURE — 2060000000 HC ICU INTERMEDIATE R&B

## 2024-10-27 PROCEDURE — 2580000003 HC RX 258: Performed by: INTERNAL MEDICINE

## 2024-10-27 PROCEDURE — 82550 ASSAY OF CK (CPK): CPT

## 2024-10-27 RX ADMIN — SODIUM CHLORIDE: 9 INJECTION, SOLUTION INTRAVENOUS at 20:09

## 2024-10-27 RX ADMIN — SODIUM CHLORIDE: 9 INJECTION, SOLUTION INTRAVENOUS at 00:25

## 2024-10-27 RX ADMIN — SODIUM CHLORIDE, PRESERVATIVE FREE 10 ML: 5 INJECTION INTRAVENOUS at 20:11

## 2024-10-27 RX ADMIN — SODIUM CHLORIDE: 9 INJECTION, SOLUTION INTRAVENOUS at 13:50

## 2024-10-27 NOTE — PLAN OF CARE
Problem: Discharge Planning  Goal: Discharge to home or other facility with appropriate resources  Outcome: Progressing     Problem: Pain  Goal: Verbalizes/displays adequate comfort level or baseline comfort level  Outcome: Progressing     Problem: Musculoskeletal - Adult  Goal: Return mobility to safest level of function  Outcome: Progressing     Problem: Cardiovascular - Adult  Goal: Maintains optimal cardiac output and hemodynamic stability  Outcome: Progressing     Problem: Skin/Tissue Integrity - Adult  Goal: Skin integrity remains intact  Outcome: Progressing     Problem: Safety - Adult  Goal: Free from fall injury  Outcome: Progressing

## 2024-10-28 VITALS
OXYGEN SATURATION: 100 % | RESPIRATION RATE: 18 BRPM | BODY MASS INDEX: 22.35 KG/M2 | HEART RATE: 64 BPM | SYSTOLIC BLOOD PRESSURE: 129 MMHG | WEIGHT: 147.49 LBS | DIASTOLIC BLOOD PRESSURE: 77 MMHG | TEMPERATURE: 98.3 F | HEIGHT: 68 IN

## 2024-10-28 LAB — CK SERPL-CCNC: 1696 U/L (ref 39–308)

## 2024-10-28 PROCEDURE — 36415 COLL VENOUS BLD VENIPUNCTURE: CPT

## 2024-10-28 PROCEDURE — 2580000003 HC RX 258: Performed by: NURSE PRACTITIONER

## 2024-10-28 PROCEDURE — 2580000003 HC RX 258: Performed by: INTERNAL MEDICINE

## 2024-10-28 PROCEDURE — 82550 ASSAY OF CK (CPK): CPT

## 2024-10-28 RX ADMIN — SODIUM CHLORIDE: 9 INJECTION, SOLUTION INTRAVENOUS at 03:02

## 2024-10-28 RX ADMIN — SODIUM CHLORIDE, PRESERVATIVE FREE 10 ML: 5 INJECTION INTRAVENOUS at 09:26

## 2024-10-28 NOTE — CARE COORDINATION
Case Management -  Discharge Note      Patient Name: Raffi Lux                   YOB: 2005            Readmission Risk (Low < 19, Mod (19-27), High > 27): Readmission Risk Score: 5.3    Current PCP: No primary care provider on file.      (IMM) Important Message from Medicare:    Has pt received appropriate IMM before discharge if required: N/A  Self Pay    PT AM-PAC:   /24  OT AM-PAC: 24 /24    Patient/patient representative has been educated on the benefits of [facility] as well as the possible risks of declining recommended services. Patient/patient representative has acknowledged the information provided and decided on the following discharge plan. Patient/ patient representative has been provided freedom of choice regarding service provider, supported by basic dialogue that supports the patient's individualized plan of care/goals.    Patient has been referred to [facility]:    [facility]  [address]   [address], [place] [address]  Phone: [phone]  Patient to call: No  Appointment scheduled prior to d/c: Yes: Comment:  made appointment  Date of appt:  11/20/2024    Financial    Payor: /     Pharmacy:  Potential assistance Purchasing Medications: No  Meds-to-Beds request: Yes      [id number] - [place], [place] - [address] [address] [facility] [address] [facility] -  [phone] - F [phone]  [address] [place] [id number]  Phone: [phone] Fax: [phone]      Notes:    Additional Case Management Notes: [facility] appointment made.    Electronically signed by PRIYANKA Isabel on 10/28/24 at 11:46 AM EDT

## 2024-10-28 NOTE — PROGRESS NOTES
[facility] Note    Patient Active Problem List   Diagnosis    Rhabdomyolysis       Past Medical History:   has a past medical history of No pertinent past medical history.    Past Social History:   reports that he has never smoked. He has never used smokeless tobacco. He reports that he does not drink alcohol and does not use drugs.    Subjective:    Urinating well.  Denies urine discoloration.     Review of Systems   Constitutional:  Negative for activity change, appetite change, chills, fatigue, fever and unexpected weight change.   HENT:  Negative for congestion and facial swelling.    Eyes:  Negative for photophobia, discharge and redness.   Respiratory:  Negative for cough, chest tightness and shortness of breath.    Cardiovascular:  Negative for chest pain, palpitations and leg swelling.   Gastrointestinal:  Negative for abdominal distention, abdominal pain, blood in stool, constipation, diarrhea, nausea and vomiting.   Endocrine: Negative for cold intolerance, heat intolerance and polyuria.   Genitourinary:  Negative for decreased urine volume, difficulty urinating, flank pain and hematuria.   Musculoskeletal:  Negative for joint swelling and neck pain.   Neurological:  Negative for dizziness, seizures, syncope, speech difficulty, light-headedness and headaches.   Hematological:  Does not bruise/bleed easily.   Psychiatric/Behavioral:  Negative for agitation, confusion and hallucinations.        Objective:      /71   Pulse 63   Temp 98.2 °F (36.8 °C) (Oral)   Resp 16   Ht 1.727 m (5' 8\")   Wt 65.4 kg (144 lb 2.9 oz)   SpO2 99%   BMI 21.92 kg/m²     Wt Readings from Last 3 Encounters:   10/23/24 65.4 kg (144 lb 2.9 oz)       BP Readings from Last 3 Encounters:   10/23/24 120/71   03/08/24 131/73     Chest- clear  Heart-regular  Abd-soft  Ext- no edema    Labs  Hemoglobin   Date Value Ref Range Status   10/23/2024 13.7 13.5 - 17.5 g/dL Final     Hematocrit   Date Value 
                    [facility] Note    Patient Active Problem List   Diagnosis    Rhabdomyolysis       Past Medical History:   has a past medical history of No pertinent past medical history.    Past Social History:   reports that he has never smoked. He has never used smokeless tobacco. He reports that he does not drink alcohol and does not use drugs.      Subjective / interval history / nephrology update / medical decision making:   Refer to assessment and plan for more details.     Patient was seen comfortably sitting up in bed,   Reported no active complaints/distress,   Renal labs noted.    Reported Urinating well.  Denies urine discoloration.   No current active complaints.   Patient denied chest pain / dizziness/lightheadedness/syncope/ SOB / leg edema/muscle weakness, currently,    His mom at bedside        Objective:      /72   Pulse 62   Temp 98 °F (36.7 °C) (Oral)   Resp 16   Ht 1.727 m (5' 8\")   Wt 66.9 kg (147 lb 7.8 oz)   SpO2 98%   BMI 22.43 kg/m²     Wt Readings from Last 3 Encounters:   10/26/24 66.9 kg (147 lb 7.8 oz)       BP Readings from Last 3 Encounters:   10/26/24 120/72   03/08/24 131/73     General: No acute distress   CVS:  Heart sounds S1 S2 +     RS: Normal respiratory effort, Breat sound: diminished at bases.     Abd: bowel sounds +, no  distension .   Extremities/MSK:  no Edema,        Labs  Hemoglobin   Date Value Ref Range Status   10/26/2024 14.7 13.5 - 17.5 g/dL Final     Hematocrit   Date Value Ref Range Status   10/26/2024 41.7 40.5 - 52.5 % Final     WBC   Date Value Ref Range Status   10/26/2024 6.5 4.0 - 11.0 K/uL Final     Platelets   Date Value Ref Range Status   10/26/2024 187 135 - 450 K/uL Final     Lab Results   Component Value Date    CREATININE 0.9 10/26/2024    BUN 9 10/26/2024     10/26/2024    K 4.1 10/26/2024     10/26/2024    CO2 25 10/26/2024     Ck 7500 down to 15,485 from 26,580 from 39,600   TSH 4.68 with free T4 of 
                    [facility] Note    Patient Active Problem List   Diagnosis    Rhabdomyolysis       Past Medical History:   has a past medical history of No pertinent past medical history.    Past Social History:   reports that he has never smoked. He has never used smokeless tobacco. He reports that he does not drink alcohol and does not use drugs.    Subjective:    Urinating well.  Denies urine discoloration.   No shortness of breath    Review of Systems   Constitutional:  Negative for activity change, appetite change, chills, fatigue, fever and unexpected weight change.   HENT:  Negative for congestion and facial swelling.    Eyes:  Negative for photophobia, discharge and redness.   Respiratory:  Negative for cough, chest tightness and shortness of breath.    Cardiovascular:  Negative for chest pain, palpitations and leg swelling.   Gastrointestinal:  Negative for abdominal distention, abdominal pain, blood in stool, constipation, diarrhea, nausea and vomiting.   Endocrine: Negative for cold intolerance, heat intolerance and polyuria.   Genitourinary:  Negative for decreased urine volume, difficulty urinating, flank pain and hematuria.   Musculoskeletal:  Negative for joint swelling and neck pain.   Neurological:  Negative for dizziness, seizures, syncope, speech difficulty, light-headedness and headaches.   Hematological:  Does not bruise/bleed easily.   Psychiatric/Behavioral:  Negative for agitation, confusion and hallucinations.        Objective:      /64   Pulse 55   Temp 98.2 °F (36.8 °C) (Oral)   Resp 18   Ht 1.727 m (5' 8\")   Wt 66.2 kg (146 lb)   SpO2 99%   BMI 22.20 kg/m²     Wt Readings from Last 3 Encounters:   10/24/24 66.2 kg (146 lb)       BP Readings from Last 3 Encounters:   10/24/24 126/64   03/08/24 131/73     Chest- clear  Heart-regular  Abd-soft  Ext- no edema    Labs  Hemoglobin   Date Value Ref Range Status   10/23/2024 13.7 13.5 - 17.5 g/dL Final     Hematocrit 
                    [facility] Note    Patient Active Problem List   Diagnosis    Rhabdomyolysis       Past Medical History:   has a past medical history of No pertinent past medical history.    Past Social History:   reports that he has never smoked. He has never used smokeless tobacco. He reports that he does not drink alcohol and does not use drugs.      Subjective / interval history / nephrology update / medical decision making:   Refer to assessment and plan for more details.     Today he was resting-sleeping-comfortably in bed     Yesterday - His mom at bedside  Reported no active complaints/distress,   Renal labs noted.    Reported Urinating well.  Denies urine discoloration.   No current active complaints.   Patient denied chest pain / dizziness/lightheadedness/syncope/ SOB / leg edema/muscle weakness, currently,          Objective:      /64   Pulse 68   Temp 97.9 °F (36.6 °C) (Oral)   Resp 18   Ht 1.727 m (5' 8\")   Wt 66.9 kg (147 lb 7.8 oz)   SpO2 99%   BMI 22.43 kg/m²     Wt Readings from Last 3 Encounters:   10/26/24 66.9 kg (147 lb 7.8 oz)       BP Readings from Last 3 Encounters:   10/27/24 100/64   03/08/24 131/73     General: No acute distress   CVS:  Heart sounds S1 S2 +     RS: Normal respiratory effort, Breat sound: diminished at bases.     Abd: bowel sounds +, no  distension .   Extremities/MSK:  no Edema,        Labs  Hemoglobin   Date Value Ref Range Status   10/26/2024 14.7 13.5 - 17.5 g/dL Final     Hematocrit   Date Value Ref Range Status   10/26/2024 41.7 40.5 - 52.5 % Final     WBC   Date Value Ref Range Status   10/26/2024 6.5 4.0 - 11.0 K/uL Final     Platelets   Date Value Ref Range Status   10/26/2024 187 135 - 450 K/uL Final     Lab Results   Component Value Date    CREATININE 0.9 10/27/2024    BUN 9 10/27/2024     10/27/2024    K 3.7 10/27/2024     10/27/2024    CO2 24 10/27/2024     Ck 7500 down to 15,485 from 26,580 from 39,600   TSH 4.68 with 
                    [facility] Note    Patient Active Problem List   Diagnosis    Rhabdomyolysis       Past Medical History:   has a past medical history of No pertinent past medical history.    Past Social History:   reports that he has never smoked. He has never used smokeless tobacco. He reports that he does not drink alcohol and does not use drugs.    Subjective:    Urinating well.  Denies urine discoloration.   No shortness of breath    Review of Systems   Constitutional:  Negative for activity change, appetite change, chills, fatigue, fever and unexpected weight change.   HENT:  Negative for congestion and facial swelling.    Eyes:  Negative for photophobia, discharge and redness.   Respiratory:  Negative for cough, chest tightness and shortness of breath.    Cardiovascular:  Negative for chest pain, palpitations and leg swelling.   Gastrointestinal:  Negative for abdominal distention, abdominal pain, blood in stool, constipation, diarrhea, nausea and vomiting.   Endocrine: Negative for cold intolerance, heat intolerance and polyuria.   Genitourinary:  Negative for decreased urine volume, difficulty urinating, flank pain and hematuria.   Musculoskeletal:  Negative for joint swelling and neck pain.   Neurological:  Negative for dizziness, seizures, syncope, speech difficulty, light-headedness and headaches.   Hematological:  Does not bruise/bleed easily.   Psychiatric/Behavioral:  Negative for agitation, confusion and hallucinations.        Objective:      /72   Pulse 64   Temp 98.6 °F (37 °C) (Oral)   Resp 16   Ht 1.727 m (5' 8\")   Wt 66.3 kg (146 lb 3.2 oz)   SpO2 98%   BMI 22.23 kg/m²     Wt Readings from Last 3 Encounters:   10/25/24 66.3 kg (146 lb 3.2 oz)       BP Readings from Last 3 Encounters:   10/25/24 115/72   03/08/24 131/73     Chest- clear  Heart-regular  Abd-soft  Ext- no edema    Labs  Hemoglobin   Date Value Ref Range Status   10/25/2024 14.3 13.5 - 17.5 g/dL Final 
                    [facility] Note    Patient Active Problem List   Diagnosis    Rhabdomyolysis       Past Medical History:   has a past medical history of No pertinent past medical history.    Past Social History:   reports that he has never smoked. He has never used smokeless tobacco. He reports that he does not drink alcohol and does not use drugs.      Subjective / interval history / nephrology update / medical decision making:   No complaints today  Muscle pain better    Objective:      /77   Pulse 64   Temp 98.3 °F (36.8 °C) (Oral)   Resp 18   Ht 1.727 m (5' 8\")   Wt 66.9 kg (147 lb 7.8 oz)   SpO2 100%   BMI 22.43 kg/m²     Wt Readings from Last 3 Encounters:   10/26/24 66.9 kg (147 lb 7.8 oz)       BP Readings from Last 3 Encounters:   10/28/24 129/77   03/08/24 131/73     General: No acute distress   CVS:  Heart sounds S1 S2 +     RS: Normal respiratory effort, Breat sound: clear  Abd: bowel sounds +, no  distension .   Extremities/MSK:  no Edema,        Labs  Hemoglobin   Date Value Ref Range Status   10/26/2024 14.7 13.5 - 17.5 g/dL Final     Hematocrit   Date Value Ref Range Status   10/26/2024 41.7 40.5 - 52.5 % Final     WBC   Date Value Ref Range Status   10/26/2024 6.5 4.0 - 11.0 K/uL Final     Platelets   Date Value Ref Range Status   10/26/2024 187 135 - 450 K/uL Final     Lab Results   Component Value Date    CREATININE 0.9 10/27/2024    BUN 9 10/27/2024     10/27/2024    K 3.7 10/27/2024     10/27/2024    CO2 24 10/27/2024     Ck 7500 down to 15,485 from 26,580 from 39,600.  Now at 1696  TSH 4.68 with free T4 of 1.3    VBG 7.36/44    Assessment/Plan:  1.  Lpwapkyjqyrpnl-th-bm-date flu shot vaccination  Renal function currently WNL.  Better.  No need for further IVF.  Discussed with patient to avoid heavy exertion for 1 week.     Renal function stable currently  Estimated Creatinine Clearance: 124 mL/min (based on SCr of 0.9 mg/dL).    TSH 4.6, free 
          10/28/2024    To whom it may concern:    Raffi Lux  was hospitalized from 10/21/2024 to 10/28/2024.  He was treated for rhabdomyolysis resulting most likely from muscle injury due to to flu vaccination.  He is cleared to return to work but advised advised to keep adequate oral hydration and to avoid strenuous exercises for the next 1 week.    If you have any questions or concerns, please do not hesitate to contact me at [phone].    Sincerely,          Electronically signed by Norma Mo MD on 10/28/2024 at 11:35 AM   
    V2.0    [facility] Progress Note      Name:  Raffi Lux /Age/Sex: 2005  (19 y.o. male)   MRN & CSN:  [id number] & [id number] Encounter Date/Time: 10/23/2024 2:10 PM EDT   Location:  [id number] PCP: No primary care provider on file.     Attending:Norma Mo MD       Hospital Day: 3    Assessment and Recommendations   Raffi Lux is a 19 y.o. male with no significant past medical history presented with with upper extremity muscle pain and weakness following flu vaccine on Saturday.  Workup revealed Rhabdomyolysis.      Plan:     Nontraumatic rhabdomyolysis:  ?  Due to flu vaccination.    Urine toxicology negative.  No history of prolonged immobilization or strenuous activity  No history of alcohol use.  Continue aggressive IV fluids administration.  Monitor CK level and renal function.  Follow-up CRP, ESR and aldolase levels.                  Diet ADULT DIET; Regular   DVT Prophylaxis [] Lovenox, []  Heparin, [x] SCDs, [x] Ambulation,  [] Eliquis, [] Xarelto  [] Coumadin   Code Status Full Code   Disposition From: Home  Expected Disposition: Home  Estimated Date of Discharge: At least 72 hours  Patient requires continued admission due to rhabdomyolysis   Surrogate Decision Maker/ Josseline Marrero (Parent)  [phone]     Personally reviewed Lab Studies and Imaging     Subjective:     Chief Complaint:  upper extremity pain and weakness    Patient seen and examined.  No interval events.   Myalgias and generalized muscle weakness improving.  No fever or chills.        Review of Systems:      A 10-12 system review obtained and updated today and is unremarkable except as mentioned  in my interval history.     Objective:     Intake/Output Summary (Last 24 hours) at 10/23/2024 1418  Last data filed at 10/23/2024 0725  Gross per 24 hour   Intake 980 ml   Output 1175 ml   Net -195 ml      Vitals:   Vitals:    10/23/24 0415 10/23/24 0715 10/23/24 0759 10/23/24 1112   BP: 111/64 113/69  120/71   Pulse: 70 
    V2.0    [facility] Progress Note      Name:  Raffi Lux /Age/Sex: 2005  (19 y.o. male)   MRN & CSN:  [id number] & [id number] Encounter Date/Time: 10/24/2024 2:10 PM EDT   Location:  [id number]- PCP: No primary care provider on file.     Attending:Norma Mo MD       Hospital Day: 4    Assessment and Recommendations   Raffi Lux is a 19 y.o. male with no significant past medical history presented with with upper extremity muscle pain and weakness following flu vaccine on Saturday.  Workup revealed Rhabdomyolysis.      Plan:     Nontraumatic rhabdomyolysis: Improving  ?  Due to flu vaccination.    Urine toxicology negative.  No history of prolonged immobilization.  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  No history of alcohol use.  Continue aggressive IV fluids administration.  Monitor CK level and renal function.  CRP, ESR not elevated.  Aldolase levels pending.   TREY with cascade negative.                Mild transaminitis: Likely due to rhabdomyolysis. Monitor LFTs.    Diet ADULT DIET; Regular   DVT Prophylaxis [] Lovenox, []  Heparin, [x] SCDs, [x] Ambulation,  [] Eliquis, [] Xarelto  [] Coumadin   Code Status Full Code   Disposition From: Home  Expected Disposition: Home  Estimated Date of Discharge: At least 72 hours  Patient requires continued admission due to rhabdomyolysis   Surrogate Decision Maker/ CLINT LuxJosseline (Parent)  [phone]     Personally reviewed Lab Studies and Imaging     Subjective:     Chief Complaint:  upper extremity pain and weakness    Patient seen and examined.  No interval events.   Myalgias and generalized muscle weakness improving.  No fever or chills.        Review of Systems:      A 10-12 system review obtained and updated today and is unremarkable except as mentioned  in my interval history.     Objective:     Intake/Output Summary (Last 24 hours) at 10/24/2024 1247  Last data filed at 10/24/2024 0919  Gross per 24 hour   Intake 
    V2.0    [facility] Progress Note      Name:  Raffi Lux /Age/Sex: 2005  (19 y.o. male)   MRN & CSN:  [id number] & [id number] Encounter Date/Time: 10/25/2024 2:10 PM EDT   Location:  [id number]- PCP: No primary care provider on file.     Attending:Norma Mo MD       Hospital Day: 5    Assessment and Recommendations   Raffi Lux is a 19 y.o. male with no significant past medical history presented with with upper extremity muscle pain and weakness following flu vaccine on Saturday.  Workup revealed Rhabdomyolysis.      Plan:     Nontraumatic rhabdomyolysis: Improving  ?  Due to flu vaccination.    Urine toxicology negative.  No history of prolonged immobilization.  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  No history of alcohol use.  Continue aggressive IV fluids administration.  Monitor CK level and renal function.  CRP, ESR not elevated.  Aldolase levels pending.   TREY with cascade negative.                Mild transaminitis: Likely due to rhabdomyolysis. Monitor LFTs.    Diet ADULT DIET; Regular   DVT Prophylaxis [] Lovenox, []  Heparin, [x] SCDs, [x] Ambulation,  [] Eliquis, [] Xarelto  [] Coumadin   Code Status Full Code   Disposition From: Home  Expected Disposition: Home  Estimated Date of Discharge: At least 72 hours  Patient requires continued admission due to rhabdomyolysis   Surrogate Decision Maker/ CLINT LuxJosseline (Parent)  [phone]     Personally reviewed Lab Studies and Imaging     Subjective:     Chief Complaint:  upper extremity pain and weakness    Patient seen and examined.  No interval events.   Myalgias and generalized muscle weakness improving.  No fever or chills.        Review of Systems:      A 10-12 system review obtained and updated today and is unremarkable except as mentioned  in my interval history.     Objective:     Intake/Output Summary (Last 24 hours) at 10/25/2024 1204  Last data filed at 10/25/2024 1030  Gross per 24 hour   Intake 
  [facility] - Inpatient Rehabilitation Department   Phone: [phone]    Occupational Therapy    [x] Initial Evaluation            [] Daily Treatment Note         [x] Discharge Summary      Patient: Raffi Lux   : 2005   MRN: [id number]   Date of Service:  10/22/2024    Admitting Diagnosis:  Rhabdomyolysis  Current Admission Summary: Raffi Lux is a 19 y.o. male who presents to the emergency department today for evaluation for concerns of arm pain.  The patient reports that he is in the [employer], and he reports that he got a flu shot on Friday.  The patient reports that he noticed some soreness on Friday which he expected.  The patient reports that on Saturday when he woke up he noticed various pains to his arms and legs I thought that this was due to body aches from the shot itself.  The patient reports that on , yesterday, he noticed some pain to his left arm as well as to his right arm.  He reports that his symptoms persisted today which prompted his visit to the ED.  The patient states that he did noticed a \"tingling\" sensation to both of his arms however denies any numbness, feeling or weakness at this time.  He reports he never had any true numbness.  He has no chest pain or shortness of breath.  He has no fever or chills.  No recent travels.  No history of DVT or PE.  He denies any abdominal pain, nausea, vomiting or diarrhea.  No urinary symptoms.  No alcohol use or drug use   Past Medical History:  has a past medical history of No pertinent past medical history.  Past Surgical History:  has a past surgical history that includes [place] tooth extraction.    Discharge Recommendations: Raffi Lux scored a 24/24 on the AM-PAC ADL Inpatient form.  At this time, no further OT is recommended upon discharge due to patient at independent level.  Recommend patient returns to prior setting with prior services.      DME Required For Discharge: No DME 
Data- discharge order received, pt verbalized agreement to discharge, disposition to previous residence, no needs for HHC/DME.     Action- discharge instructions prepared and given to pt, pt verbalized understanding. Medication information packet given r/t NEW and/or CHANGED prescriptions emphasizing name/purpose/side effects, pt verbalized understanding. Discharge instruction summary: Diet- regular, Activity- no strenous exercise for one week, Primary Care Physician as follows: No primary care provider on file. None f/u appointment as scheduled in clinic, immunizations reviewed, prescription medications filled na.     1. WEIGHT: Admit Weight - Scale: 63.5 kg (140 lb) (10/21/24 2041)        Today  Weight - Scale: 66.9 kg (147 lb 7.8 oz) (10/26/24 0654)       2. O2 SAT.: SpO2: 100 % (10/28/24 1100)    Response- Pt belongings gathered, IV removed. Disposition is home (no HHC/DME needs), transported with personal belongings, taken to lobby via w/c w/ RN, no complications.   
Lab able to add on troponin that is ordered. Nate (tech) in to do EKG now.   
No changes in initial assessment. Moving all extremities well. Continues to have some discomfort in mckinley upper extremities. No change in strength or sensation. Ambulating without difficulty. Voiding well. Waiting for am CK results.  
Patient seen and examined by one of my partners earlier today.    I agree with their assessment and plan.   Patient also seen and examined by me today.  Chart reviewed.    Necessary orders placed.  Will continue to follow while in the hospital.        Norma Mo MD   
Patient seen in ED, room 08.  Admission completed with the following exceptions:  4 Eyes Assessment, Immunizations, Vaccines, Rights and Responsibilities, Orientation to room, Plan of Care, Education/Learning Assessment and Education Plan, white board, height and weight, pain assessment and head to toe assessment.  Patient is alert and oriented X 4.  Patient lives in a house with his parents and is being admitted for Rhabdomyolysis.  Home Medication reconciliation will be/has been completed by Pharmacy Staff.  All patient's and/or family's questions answered.     Patient is not on any prescription medications or over the counter.   
Physical Therapy  Patient evaluated by OT yesterday and was functionally independent, able to ambulate 200' in hallway without limitations and was discharged by OT services.  Per RN note, patient independently ambulating in room.  Will sign off.    Nate Kirkpatrick PT, DPT, ATC-R [id number]        
24 hour   Intake 480 ml   Output 1650 ml   Net -1170 ml      Vitals:   Vitals:    10/26/24 2351 10/27/24 0555 10/27/24 0800 10/27/24 0906   BP: 127/78 (!) 102/59     Pulse: 61 72 74 78   Resp: 16 16     Temp: 98.4 °F (36.9 °C) 97.6 °F (36.4 °C)     TempSrc: Oral Oral     SpO2: 96% 98%     Weight:       Height:             Physical Exam:    General appearance: No apparent distress, appears stated age and cooperative.  HEENT: Pupils equal, round, and reactive to light. Conjunctivae clear.  Neck: Supple, with full range of motion. Trachea midline.  Respiratory:  Normal respiratory effort. Clear to auscultation, bilaterally without Rales/Wheezes/Rhonchi.  Cardiovascular: Regular rate and rhythm with normal S1/S2 without murmurs, rubs or gallops.  Abdomen: Soft, non-tender, non-distended with normal bowel sounds.  Musculoskeletal: No clubbing, cyanosis or edema bilaterally.  Full range of motion without deformity.  Skin: Skin color, texture, turgor normal.  No rashes or lesions.  Neurologic:  Neurovascularly intact without any focal sensory/motor deficits. Cranial nerves: II-XII intact, grossly non-focal.  Psychiatric: Alert and oriented, thought content appropriate, normal insight  Capillary Refill: Brisk, 3 seconds, normal   Peripheral Pulses: +2 palpable, equal bilaterally     Medications:   Medications:   • sodium chloride flush  5-40 mL IntraVENous 2 times per day      Infusions:   • sodium chloride 150 mL/hr at 10/27/24 0025   • sodium chloride 150 mL/hr at 10/23/24 0052     PRN Meds: sodium chloride flush, 5-40 mL, PRN  sodium chloride, , PRN  magnesium sulfate, 2,000 mg, PRN  ondansetron, 4 mg, Q8H PRN   Or  ondansetron, 4 mg, Q6H PRN  polyethylene glycol, 17 g, Daily PRN  acetaminophen, 650 mg, Q6H PRN   Or  acetaminophen, 650 mg, Q6H PRN        Labs and Imaging   No results found.    CBC:   Recent Labs     10/25/24  0448 10/26/24  0442   WBC 7.3 6.5   HGB 14.3 14.7    187     BMP:    Recent Labs     
  Recent Labs     10/24/24  0443 10/25/24  0448 10/26/24  0442    141 140   K 3.9 3.9 4.1    108 107   CO2 19* 23 25   BUN 5* 8 9   CREATININE 0.8* 0.9 0.9   GLUCOSE 81 89 86     Hepatic:   Recent Labs     10/24/24  0443 10/25/24  0448 10/26/24  0442   * 229* 136*   ALT 92* 90* 79*   BILITOT 0.3 0.3 0.3   ALKPHOS 68 72 76     Lipids: No results found for: \"CHOL\", \"HDL\", \"TRIG\"  Hemoglobin A1C: No results found for: \"LABA1C\"  TSH: No results found for: \"TSH\"  Troponin: No results found for: \"TROPONINT\"  Lactic Acid: No results for input(s): \"LACTA\" in the last 72 hours.  BNP: No results for input(s): \"PROBNP\" in the last 72 hours.  UA:  Lab Results   Component Value Date/Time    NITRU Negative 10/21/2024 10:47 PM    COLORU Yellow 10/21/2024 10:47 PM    PHUR 7.5 10/21/2024 10:47 PM    PHUR 7.5 10/21/2024 10:47 PM    CLARITYU Clear 10/21/2024 10:47 PM    LEUKOCYTESUR Negative 10/21/2024 10:47 PM    UROBILINOGEN 1.0 10/21/2024 10:47 PM    BILIRUBINUR Negative 10/21/2024 10:47 PM    BLOODU Negative 10/21/2024 10:47 PM    GLUCOSEU Negative 10/21/2024 10:47 PM    KETUA Negative 10/21/2024 10:47 PM     Urine Cultures: No results found for: \"LABURIN\"  Blood Cultures: No results found for: \"BC\"  No results found for: \"BLOODCULT2\"  Organism: No results found for: \"ORG\"      Electronically signed by Norma Mo MD on 10/26/2024 at 11:37 AM

## 2024-10-28 NOTE — CARE COORDINATION
10/28/24 1220   IMM Letter   IMM Letter given to Patient/Family/Significant other/Guardian/POA/by: IMM given by CM   IMM Letter date given: 10/28/24   IMM Letter time given: 1128

## 2024-10-28 NOTE — DISCHARGE INSTR - COC
Continuity of Care Form    Patient Name: Raffi Lux   :  2005  MRN:  [id number]    Admit date:  10/21/2024  Discharge date:  ***    Code Status Order: Full Code   Advance Directives:   Advance Care Flowsheet Documentation             Admitting Physician:  Meagan Arteaga MD  PCP: No primary care provider on file.    Discharging Nurse: ***  Discharging Hospital Unit/Room#: 3TN-3368/3368-01  Discharging Unit Phone Number: ***    Emergency Contact:   Extended Emergency Contact Information  Primary Emergency Contact: Josseline Lux  Mobile Phone: [phone]  Relation: Parent  Preferred language: English   needed? No  Secondary Emergency Contact: Bry Lux  Mobile Phone: [phone]  Relation: Parent   needed? No    Past Surgical History:  Past Surgical History:   Procedure Laterality Date    WISDOM TOOTH EXTRACTION         Immunization History:     There is no immunization history on file for this patient.    Active Problems:  Patient Active Problem List   Diagnosis Code    Rhabdomyolysis M62.82       Isolation/Infection:   Isolation            No Isolation          Patient Infection Status       None to display                     Nurse Assessment:  Last Vital Signs: /77   Pulse 64   Temp 98.3 °F (36.8 °C) (Oral)   Resp 18   Ht 1.727 m (5' 8\")   Wt 66.9 kg (147 lb 7.8 oz)   SpO2 100%   BMI 22.43 kg/m²     Last documented pain score (0-10 scale): Pain Level: 0  Last Weight:   Wt Readings from Last 1 Encounters:   10/26/24 66.9 kg (147 lb 7.8 oz)     Mental Status:  {IP PT MENTAL STATUS:42140}    IV Access:  { CASSI IV ACCESS:909584978}    Nursing Mobility/ADLs:  Walking   {CHP DME ADLs:215404157}  Transfer  {CHP DME ADLs:120945941}  Bathing  {CHP DME ADLs:144456099}  Dressing  {CHP DME ADLs:004039519}  Toileting  {CHP DME ADLs:271305047}  Feeding  {CHP DME ADLs:178572055}  Med Admin  {CHP DME ADLs:654640000}  Med Delivery   { CASSI MED Delivery:023069215}    Wound Care

## 2024-10-28 NOTE — PLAN OF CARE
Problem: Discharge Planning  Goal: Discharge to home or other facility with appropriate resources  10/28/2024 1106 by Lady Allen RN  Outcome: Completed  10/28/2024 0453 by Cleo Onofre RN  Outcome: Progressing     Problem: Pain  Goal: Verbalizes/displays adequate comfort level or baseline comfort level  10/28/2024 1106 by Lady Allen RN  Outcome: Completed  10/28/2024 0453 by Cleo Onofre RN  Outcome: Progressing     Problem: Musculoskeletal - Adult  Goal: Return mobility to safest level of function  10/28/2024 1106 by Lady Allen RN  Outcome: Completed  10/28/2024 0453 by Cleo Onofre RN  Outcome: Progressing     Problem: Safety - Adult  Goal: Free from fall injury  10/28/2024 1106 by Lady Allen RN  Outcome: Completed  10/28/2024 0453 by Cleo Onofre RN  Outcome: Progressing     Problem: Cardiovascular - Adult  Goal: Maintains optimal cardiac output and hemodynamic stability  10/28/2024 1106 by Lady Allen RN  Outcome: Completed  10/28/2024 0453 by Cleo Onorfe RN  Outcome: Progressing     Problem: Skin/Tissue Integrity - Adult  Goal: Skin integrity remains intact  10/28/2024 1106 by Lady Allen RN  Outcome: Completed  10/28/2024 0453 by Cleo Onofre RN  Outcome: Progressing

## 2024-10-28 NOTE — DISCHARGE SUMMARY
V2.0  Discharge Summary    Name:  Raffi Lux /Age/Sex: 2005 (19 y.o. male)   Admit Date: 10/21/2024  Discharge Date: 10/28/24    MRN & CSN:  [id number] & [id number] Encounter Date and Time 10/28/24 11:29 AM EDT    Attending:  Norma Mo MD Discharging Provider: Norma Mo MD       Hospital Course:     Brief HPI: Raffi Lux is a 19 y.o. male with no significant past medical history presented with with upper extremity muscle pain and weakness following flu vaccine on Saturday.  Workup revealed Rhabdomyolysis.     Nontraumatic rhabdomyolysis: Improved significantly with aggressive IV Fluids.  Likely due to flu vaccination.    Urine toxicology negative.  No history of prolonged immobilization.  No history of alcohol use.  CRP, ESR not elevated.  Aldolase levels elevated consistent with muscle injury.   TREY with cascade negative.  CK levels trended down from approximately 40,000 to 1696 on discharge.  Cleared for discharge home.  Advised to keep adequate oral hydration and to avoid strenuous exercises for the next 1 week.                Mild transaminitis: Resolving.  Likely due to rhabdomyolysis.     The patient expressed appropriate understanding of, and agreement with the discharge recommendations, medications, and plan.     Consults this admission:  IP CONSULT TO HOSPITALIST  IP CONSULT TO NEPHROLOGY  IP CONSULT TO CASE MANAGEMENT  IP CONSULT TO SOCIAL WORK    Discharge Diagnosis:   Rhabdomyolysis      Discharge Instruction:   Follow up appointments: PCP  Primary care physician: No primary care provider on file. within 1 week  Diet: regular diet   Activity: activity as tolerated  Disposition: Discharged to:   [x]Home, []C, []SNF, []Acute Rehab, []Hospice   Condition on discharge: Stable  Labs and Tests to be Followed up as an outpatient by PCP or Specialist: None    Discharge Medications:        Medication List        STOP taking these medications      FLUoxetine 40 MG capsule  Commonly

## 2024-11-20 ENCOUNTER — OFFICE VISIT (OUTPATIENT)
Age: 19
End: 2024-11-20

## 2024-11-20 VITALS
HEART RATE: 73 BPM | DIASTOLIC BLOOD PRESSURE: 60 MMHG | WEIGHT: 143.4 LBS | OXYGEN SATURATION: 99 % | HEIGHT: 69 IN | SYSTOLIC BLOOD PRESSURE: 106 MMHG | BODY MASS INDEX: 21.24 KG/M2

## 2024-11-20 DIAGNOSIS — H90.3 SENSORINEURAL HEARING LOSS (SNHL) OF BOTH EARS: ICD-10-CM

## 2024-11-20 DIAGNOSIS — T14.91XA SUICIDE ATTEMPT (HCC): ICD-10-CM

## 2024-11-20 DIAGNOSIS — M62.82 NON-TRAUMATIC RHABDOMYOLYSIS: Primary | ICD-10-CM

## 2024-11-20 DIAGNOSIS — F33.42 RECURRENT MAJOR DEPRESSIVE DISORDER, IN FULL REMISSION (HCC): ICD-10-CM

## 2024-11-20 DIAGNOSIS — L30.1 ECZEMA, DYSHIDROTIC: ICD-10-CM

## 2024-11-20 PROBLEM — F33.9 MAJOR DEPRESSIVE DISORDER, RECURRENT (HCC): Status: ACTIVE | Noted: 2018-10-08

## 2024-11-20 PROCEDURE — 99203 OFFICE O/P NEW LOW 30 MIN: CPT | Performed by: INTERNAL MEDICINE

## 2024-11-20 SDOH — ECONOMIC STABILITY: INCOME INSECURITY: HOW HARD IS IT FOR YOU TO PAY FOR THE VERY BASICS LIKE FOOD, HOUSING, MEDICAL CARE, AND HEATING?: HARD

## 2024-11-20 SDOH — ECONOMIC STABILITY: FOOD INSECURITY: WITHIN THE PAST 12 MONTHS, YOU WORRIED THAT YOUR FOOD WOULD RUN OUT BEFORE YOU GOT MONEY TO BUY MORE.: NEVER TRUE

## 2024-11-20 SDOH — ECONOMIC STABILITY: FOOD INSECURITY: WITHIN THE PAST 12 MONTHS, THE FOOD YOU BOUGHT JUST DIDN'T LAST AND YOU DIDN'T HAVE MONEY TO GET MORE.: OFTEN TRUE

## 2024-11-20 SDOH — HEALTH STABILITY: PHYSICAL HEALTH: ON AVERAGE, HOW MANY DAYS PER WEEK DO YOU ENGAGE IN MODERATE TO STRENUOUS EXERCISE (LIKE A BRISK WALK)?: 2 DAYS

## 2024-11-20 SDOH — HEALTH STABILITY: PHYSICAL HEALTH: ON AVERAGE, HOW MANY MINUTES DO YOU ENGAGE IN EXERCISE AT THIS LEVEL?: 30 MIN

## 2024-11-20 ASSESSMENT — ENCOUNTER SYMPTOMS
BLOOD IN STOOL: 0
VOMITING: 0
SINUS PRESSURE: 0
DIARRHEA: 0
SHORTNESS OF BREATH: 0
EYE PAIN: 0
ABDOMINAL PAIN: 0
COLOR CHANGE: 0
EYE REDNESS: 0
WHEEZING: 0
COUGH: 0
NAUSEA: 0

## 2024-11-20 ASSESSMENT — ANXIETY QUESTIONNAIRES
IF YOU CHECKED OFF ANY PROBLEMS ON THIS QUESTIONNAIRE, HOW DIFFICULT HAVE THESE PROBLEMS MADE IT FOR YOU TO DO YOUR WORK, TAKE CARE OF THINGS AT HOME, OR GET ALONG WITH OTHER PEOPLE: NOT DIFFICULT AT ALL
6. BECOMING EASILY ANNOYED OR IRRITABLE: NOT AT ALL
1. FEELING NERVOUS, ANXIOUS, OR ON EDGE: NOT AT ALL
GAD7 TOTAL SCORE: 0
2. NOT BEING ABLE TO STOP OR CONTROL WORRYING: NOT AT ALL
4. TROUBLE RELAXING: NOT AT ALL
5. BEING SO RESTLESS THAT IT IS HARD TO SIT STILL: NOT AT ALL
3. WORRYING TOO MUCH ABOUT DIFFERENT THINGS: NOT AT ALL
7. FEELING AFRAID AS IF SOMETHING AWFUL MIGHT HAPPEN: NOT AT ALL

## 2024-11-20 ASSESSMENT — PATIENT HEALTH QUESTIONNAIRE - PHQ9
SUM OF ALL RESPONSES TO PHQ QUESTIONS 1-9: 0
8. MOVING OR SPEAKING SO SLOWLY THAT OTHER PEOPLE COULD HAVE NOTICED. OR THE OPPOSITE, BEING SO FIGETY OR RESTLESS THAT YOU HAVE BEEN MOVING AROUND A LOT MORE THAN USUAL: NOT AT ALL
9. THOUGHTS THAT YOU WOULD BE BETTER OFF DEAD, OR OF HURTING YOURSELF: NOT AT ALL
1. LITTLE INTEREST OR PLEASURE IN DOING THINGS: NOT AT ALL
SUM OF ALL RESPONSES TO PHQ QUESTIONS 1-9: 0
SUM OF ALL RESPONSES TO PHQ QUESTIONS 1-9: 0
4. FEELING TIRED OR HAVING LITTLE ENERGY: NOT AT ALL
3. TROUBLE FALLING OR STAYING ASLEEP: NOT AT ALL
5. POOR APPETITE OR OVEREATING: NOT AT ALL
10. IF YOU CHECKED OFF ANY PROBLEMS, HOW DIFFICULT HAVE THESE PROBLEMS MADE IT FOR YOU TO DO YOUR WORK, TAKE CARE OF THINGS AT HOME, OR GET ALONG WITH OTHER PEOPLE: NOT DIFFICULT AT ALL
2. FEELING DOWN, DEPRESSED OR HOPELESS: NOT AT ALL
7. TROUBLE CONCENTRATING ON THINGS, SUCH AS READING THE NEWSPAPER OR WATCHING TELEVISION: NOT AT ALL
SUM OF ALL RESPONSES TO PHQ9 QUESTIONS 1 & 2: 0
SUM OF ALL RESPONSES TO PHQ QUESTIONS 1-9: 0
6. FEELING BAD ABOUT YOURSELF - OR THAT YOU ARE A FAILURE OR HAVE LET YOURSELF OR YOUR FAMILY DOWN: NOT AT ALL

## 2024-11-20 NOTE — ASSESSMENT & PLAN NOTE
He states his mood is good today, has no self-harm ideation. I did recommend a referral to psychology, [facility] wishes [facility] and declines.

## 2024-11-20 NOTE — ASSESSMENT & PLAN NOTE
He was discharged 10/28 and is doing well    We did discuss rhabdoymyolysis is not a contradindication to future inlfuenza vaccines, but that I cannot guarentee that he may have the same reaction in the future

## 2024-11-20 NOTE — PROGRESS NOTES
oropharyngeal exudate.   Eyes:      Extraocular Movements: Extraocular movements intact.      Pupils: Pupils are equal, round, and reactive to light.   Cardiovascular:      Rate and Rhythm: Normal rate and regular rhythm.   Pulmonary:      Breath sounds: Normal breath sounds. No wheezing, rhonchi or rales.   Abdominal:      General: There is no distension.      Palpations: Abdomen is soft.      Tenderness: There is no abdominal tenderness.   Musculoskeletal:      Cervical back: No rigidity.      Right lower leg: No edema.      Left lower leg: No edema.   Lymphadenopathy:      Cervical: No cervical adenopathy.   Skin:     General: Skin is warm and dry.      Findings: Rash (mild dyshidrotic eczema of left hand.) present.   Neurological:      General: No focal deficit present.      Mental Status: He is alert and oriented to person, place, and time.   Psychiatric:         Mood and Affect: Mood normal.         Behavior: Behavior normal.           An electronic signature was used to authenticate this note.    Electronically signed by Se Herr MD on 11/20/2024 at 11:40 AM.

## 2024-11-26 ENCOUNTER — PROCEDURE VISIT (OUTPATIENT)
Dept: AUDIOLOGY | Age: 19
End: 2024-11-26

## 2024-11-26 DIAGNOSIS — H93.13 TINNITUS OF BOTH EARS: Primary | ICD-10-CM

## 2024-11-26 DIAGNOSIS — Z01.10 ENCOUNTER FOR EXAMINATION OF EARS AND HEARING WITHOUT ABNORMAL FINDINGS: ICD-10-CM

## 2024-11-26 PROCEDURE — 92567 TYMPANOMETRY: CPT | Performed by: AUDIOLOGIST

## 2024-11-26 PROCEDURE — 92557 COMPREHENSIVE HEARING TEST: CPT | Performed by: AUDIOLOGIST

## 2024-11-26 NOTE — PROGRESS NOTES
Raffi Lux   2005, 19 y.o. male   [id number]       Referring Provider: Se Herr MD   Referral Type: In an order in Epic    Reason for Visit: Evaluation of suspected change in hearing, tinnitus, or balance.    ADULT AUDIOLOGIC EVALUATION      Raffi Lux is a 19 y.o. male seen today, 11/26/2024 , for an initial audiologic evaluation.      AUDIOLOGIC AND OTHER PERTINENT MEDICAL HISTORY:        Raffi Lux noted concern for tinnitus bilaterally, might be worse in right ear, more noticeable in quiet environments, this has been present for several years; overall feels he is hearing well, this is worse when he has cerumen buildup like he has in the past; history of noise exposure, history of  experience and being around [employer].     He denied otalgia, aural fullness, otorrhea, dizziness, imbalance, history of ear surgery, and family history of hearing loss.    IMPRESSIONS:        Today's results revealed hearing sensitivity within normal limits with normal middle ear function and excellent word recognition for soft conversational speech in both ears.  Discussed tinnitus management strategies and recommended to continue protecting ears in loud environments.  Given significant cerumen in right ear canal, recommended ENT evaluation for ear cleaning.    Follow medical recommendations of Se Herr MD .     ASSESSMENT AND FINDINGS:       Otoscopy revealed: Occlusive cerumen in right ear canal and a clear left ear canal.  Completed tympanometry, and continued with testing given normal middle ear function for both ears.    RIGHT EAR:  Hearing Sensitivity: Within normal limits  Speech Recognition Threshold: 5 dBHL  Word Recognition: Excellent (100%), based on NU-6 by-difficulty list at 45 dBHL, masked, using recorded speech stimuli.    Tympanometry: Normal peak pressure and compliance, Type A tympanogram, consistent with normal middle ear function.       LEFT EAR:  Hearing

## 2025-03-11 ENCOUNTER — OFFICE VISIT (OUTPATIENT)
Age: 20
End: 2025-03-11
Payer: COMMERCIAL

## 2025-03-11 VITALS
BODY MASS INDEX: 20.77 KG/M2 | WEIGHT: 141 LBS | SYSTOLIC BLOOD PRESSURE: 128 MMHG | HEART RATE: 63 BPM | OXYGEN SATURATION: 99 % | DIASTOLIC BLOOD PRESSURE: 84 MMHG

## 2025-03-11 DIAGNOSIS — L30.1 ECZEMA, DYSHIDROTIC: Primary | ICD-10-CM

## 2025-03-11 DIAGNOSIS — R07.89 OTHER CHEST PAIN: ICD-10-CM

## 2025-03-11 DIAGNOSIS — M25.512 CHRONIC LEFT SHOULDER PAIN: ICD-10-CM

## 2025-03-11 DIAGNOSIS — G89.29 CHRONIC LEFT SHOULDER PAIN: ICD-10-CM

## 2025-03-11 PROBLEM — M25.511 CHRONIC RIGHT SHOULDER PAIN: Status: ACTIVE | Noted: 2025-03-11

## 2025-03-11 PROCEDURE — 99213 OFFICE O/P EST LOW 20 MIN: CPT | Performed by: INTERNAL MEDICINE

## 2025-03-11 PROCEDURE — G8420 CALC BMI NORM PARAMETERS: HCPCS | Performed by: INTERNAL MEDICINE

## 2025-03-11 PROCEDURE — 1036F TOBACCO NON-USER: CPT | Performed by: INTERNAL MEDICINE

## 2025-03-11 PROCEDURE — G8427 DOCREV CUR MEDS BY ELIG CLIN: HCPCS | Performed by: INTERNAL MEDICINE

## 2025-03-11 RX ORDER — HYDROCORTISONE 25 MG/G
CREAM TOPICAL
Qty: 453.6 G | Refills: 1 | Status: SHIPPED | OUTPATIENT
Start: 2025-03-11

## 2025-03-11 ASSESSMENT — PATIENT HEALTH QUESTIONNAIRE - PHQ9
SUM OF ALL RESPONSES TO PHQ QUESTIONS 1-9: 0
2. FEELING DOWN, DEPRESSED OR HOPELESS: NOT AT ALL
8. MOVING OR SPEAKING SO SLOWLY THAT OTHER PEOPLE COULD HAVE NOTICED. OR THE OPPOSITE, BEING SO FIGETY OR RESTLESS THAT YOU HAVE BEEN MOVING AROUND A LOT MORE THAN USUAL: NOT AT ALL
SUM OF ALL RESPONSES TO PHQ QUESTIONS 1-9: 0
SUM OF ALL RESPONSES TO PHQ QUESTIONS 1-9: 0
3. TROUBLE FALLING OR STAYING ASLEEP: NOT AT ALL
10. IF YOU CHECKED OFF ANY PROBLEMS, HOW DIFFICULT HAVE THESE PROBLEMS MADE IT FOR YOU TO DO YOUR WORK, TAKE CARE OF THINGS AT HOME, OR GET ALONG WITH OTHER PEOPLE: NOT DIFFICULT AT ALL
5. POOR APPETITE OR OVEREATING: NOT AT ALL
4. FEELING TIRED OR HAVING LITTLE ENERGY: NOT AT ALL
SUM OF ALL RESPONSES TO PHQ QUESTIONS 1-9: 0
6. FEELING BAD ABOUT YOURSELF - OR THAT YOU ARE A FAILURE OR HAVE LET YOURSELF OR YOUR FAMILY DOWN: NOT AT ALL
1. LITTLE INTEREST OR PLEASURE IN DOING THINGS: NOT AT ALL
9. THOUGHTS THAT YOU WOULD BE BETTER OFF DEAD, OR OF HURTING YOURSELF: NOT AT ALL
7. TROUBLE CONCENTRATING ON THINGS, SUCH AS READING THE NEWSPAPER OR WATCHING TELEVISION: NOT AT ALL

## 2025-03-11 ASSESSMENT — ENCOUNTER SYMPTOMS
ABDOMINAL PAIN: 0
EYE PAIN: 0
BLOOD IN STOOL: 0
WHEEZING: 0
EYE REDNESS: 0
SINUS PRESSURE: 0
SHORTNESS OF BREATH: 0

## 2025-03-11 NOTE — PROGRESS NOTES
Raffi Lux (:  2005) is a 19 y.o. male,Established patient, here for evaluation of the following chief complaint(s):  Eczema (Rash on hands has not gotten any better he is continually moisturizing and no improvement. ), Shoulder Pain (Left posterior shoulder. Patient has done PT before it helped while doing it but once completed pain came back. Is interested in an ortho referral. ), and Chest Pain (Patient states it happens every other week, used to be a sharp pain a few months ago but is now more like a deep pressure in the middle of his chest. Patient has had previous imaging in 2024 and everything came back normal. )      ASSESSMENT/PLAN:  1. Eczema, dyshidrotic  Assessment & Plan:     The patient has had suboptimal control with moisturizers, would like steroid cream. Will start 2.5% hydrocortisone - explained to minimize use of this to get skin under control, maintain control with moisturization.  2. Chronic left shoulder pain  Assessment & Plan:     Negative drop arm, emptry can and Spurling maneuver. Suspect possible rotator cuff injury without evidence of full thickness tear.  Refer to PT  3. Other chest pain  Assessment & Plan:     The patient believes this may be stress or anxiety related. I recommended Psychology referral and the patient declined. He had an evaluation at an outside ED that was unremarkable. Symptoms not consistent with GI pathology (GERD) or any cardiac or pulmonary pathology.      Return in about 3 months (around 2025) for Eczema.    SUBJECTIVE/OBJECTIVE:  This is a 19 year old man with no prior medical history admitted 10/21 - 10/28 for rhabdomyolysis thought to be caused by influenza vaccination.   He also has a history of depression and anxiety with prior suicidal ideation, history of dishidrotic eczema.   He presents in follow up.  He is moisturizing his hands more, he is still struggling with dishydrotic eczema. He has used steroid creams in the past with

## 2025-03-11 NOTE — ASSESSMENT & PLAN NOTE
The patient has had suboptimal control with moisturizers, would like steroid cream. Will start 2.5% hydrocortisone - explained to minimize use of this to get skin under control, maintain control with moisturization.

## 2025-03-11 NOTE — ASSESSMENT & PLAN NOTE
The patient believes this may be stress or anxiety related. I recommended Psychology referral and the patient declined. He had an evaluation at an outside ED that was unremarkable. Symptoms not consistent with GI pathology (GERD) or any cardiac or pulmonary pathology.

## 2025-03-11 NOTE — ASSESSMENT & PLAN NOTE
Negative drop arm, emptry can and Spurling maneuver. Suspect possible rotator cuff injury without evidence of full thickness tear.  Refer to PT

## 2025-03-19 ENCOUNTER — HOSPITAL ENCOUNTER (OUTPATIENT)
Dept: PHYSICAL THERAPY | Age: 20
Setting detail: THERAPIES SERIES
Discharge: HOME | End: 2025-03-19
Payer: COMMERCIAL

## 2025-03-19 DIAGNOSIS — R68.89 DECREASED ACTIVITY TOLERANCE: ICD-10-CM

## 2025-03-19 DIAGNOSIS — R53.1 WEAKNESS: Primary | ICD-10-CM

## 2025-03-19 PROCEDURE — 97530 THERAPEUTIC ACTIVITIES: CPT

## 2025-03-19 PROCEDURE — 97161 PT EVAL LOW COMPLEX 20 MIN: CPT

## 2025-03-19 PROCEDURE — 97110 THERAPEUTIC EXERCISES: CPT

## 2025-03-19 NOTE — PLAN OF CARE
present problem and no personal factors and/or co-morbidities that impact the plan of care  [x] A total of 4 or more elements found upon examination of body systems using standardized tests and measures addressing any of the following: body structures, functions (impairments), activity limitations, and/or participation restrictions  [x] A clinical presentation with stable and/or uncomplicated characteristics   [x] Clinical decision making of LOW (39571 - Typically 20 minutes face-to-face) complexity using standardized patient assessment instrument and/or measurable assessment of functional outcome.    Today's Assessment: See above    Medical Necessity Documentation:  I certify that this patient meets the below criteria necessary for medical necessity for care and/or justification of therapy services:  The patient has functional impairments and/or activity limitations and would benefit from continued outpatient therapy services to address the deficits outlined in the patients goals  The patient has a musculoskeletal condition(s) with a corresponding ICD-10 code that is of complexity and severity that require skilled therapeutic intervention. This has a direct and significant impact on the need for therapy and significantly impacts the rate of recovery.   The patient has a complexity identified by an ICD-10 code that has a direct and significant impact on the need for therapy.  (Significantly impacts the rate of recovery and is associated with a primary condition.)     Return to Play: NA    Prognosis for POC: [x] Good [] Fair  [] Poor    Patient requires continued skilled intervention: [x] Yes  [] No      CHARGE CAPTURE     PT CHARGE GRID   CPT Code (TIMED) minutes # CPT Code (UNTIMED) #     Therex (80320)  11 1  EVAL:LOW (25206 - Typically 20 minutes face-to-face) 1    Neuromusc. Re-ed (44286)    Re-Eval (98563)     Manual (32971)    Estim Unattended (78381)     Ther. Act (51103) 8 1  [facility] (41078)     Gait

## 2025-03-24 ENCOUNTER — HOSPITAL ENCOUNTER (INPATIENT)
Age: 20
LOS: 6 days | Discharge: HOME | DRG: 351 | End: 2025-03-30
Attending: INTERNAL MEDICINE | Admitting: INTERNAL MEDICINE
Payer: COMMERCIAL

## 2025-03-24 DIAGNOSIS — M62.82 NON-TRAUMATIC RHABDOMYOLYSIS: Primary | ICD-10-CM

## 2025-03-24 LAB
ALBUMIN SERPL-MCNC: 4.2 G/DL (ref 3.4–5)
ALBUMIN/GLOB SERPL: 1.6 {RATIO} (ref 1.1–2.2)
ALP SERPL-CCNC: 75 U/L (ref 40–129)
ALT SERPL-CCNC: 34 U/L (ref 10–40)
AMPHETAMINES UR QL SCN>1000 NG/ML: NORMAL
ANION GAP SERPL CALCULATED.3IONS-SCNC: 12 MMOL/L (ref 3–16)
AST SERPL-CCNC: 149 U/L (ref 15–37)
BARBITURATES UR QL SCN>200 NG/ML: NORMAL
BASOPHILS # BLD: 0.1 K/UL (ref 0–0.2)
BASOPHILS NFR BLD: 0.9 %
BENZODIAZ UR QL SCN>200 NG/ML: NORMAL
BILIRUB SERPL-MCNC: 0.6 MG/DL (ref 0–1)
BILIRUB UR QL STRIP.AUTO: NEGATIVE
BUN SERPL-MCNC: 6 MG/DL (ref 7–20)
CALCIUM SERPL-MCNC: 9.5 MG/DL (ref 8.3–10.6)
CANNABINOIDS UR QL SCN>50 NG/ML: NORMAL
CHLORIDE SERPL-SCNC: 105 MMOL/L (ref 99–110)
CK SERPL-CCNC: ABNORMAL U/L (ref 39–308)
CLARITY UR: CLEAR
CO2 SERPL-SCNC: 24 MMOL/L (ref 21–32)
COCAINE UR QL SCN: NORMAL
COLOR UR: YELLOW
CREAT SERPL-MCNC: 0.9 MG/DL (ref 0.9–1.3)
CRP SERPL-MCNC: 24.8 MG/L (ref 0–5.1)
DEPRECATED RDW RBC AUTO: 13.5 % (ref 12.4–15.4)
DRUG SCREEN COMMENT UR-IMP: NORMAL
EOSINOPHIL # BLD: 0.2 K/UL (ref 0–0.6)
EOSINOPHIL NFR BLD: 2.8 %
ERYTHROCYTE [SEDIMENTATION RATE] IN BLOOD BY WESTERGREN METHOD: 13 MM/HR (ref 0–15)
FENTANYL SCREEN, URINE: NORMAL
GFR SERPLBLD CREATININE-BSD FMLA CKD-EPI: >90 ML/MIN/{1.73_M2}
GLUCOSE SERPL-MCNC: 93 MG/DL (ref 70–99)
GLUCOSE UR STRIP.AUTO-MCNC: NEGATIVE MG/DL
HCT VFR BLD AUTO: 42.2 % (ref 40.5–52.5)
HGB BLD-MCNC: 14.6 G/DL (ref 13.5–17.5)
HGB UR QL STRIP.AUTO: NEGATIVE
KETONES UR STRIP.AUTO-MCNC: NEGATIVE MG/DL
LEUKOCYTE ESTERASE UR QL STRIP.AUTO: NEGATIVE
LYMPHOCYTES # BLD: 1.7 K/UL (ref 1–5.1)
LYMPHOCYTES NFR BLD: 21.1 %
MAGNESIUM SERPL-MCNC: 1.99 MG/DL (ref 1.8–2.4)
MCH RBC QN AUTO: 28.9 PG (ref 26–34)
MCHC RBC AUTO-ENTMCNC: 34.5 G/DL (ref 31–36)
MCV RBC AUTO: 83.8 FL (ref 80–100)
METHADONE UR QL SCN>300 NG/ML: NORMAL
MONOCYTES # BLD: 0.9 K/UL (ref 0–1.3)
MONOCYTES NFR BLD: 10.8 %
NEUTROPHILS # BLD: 5.3 K/UL (ref 1.7–7.7)
NEUTROPHILS NFR BLD: 64.4 %
NITRITE UR QL STRIP.AUTO: NEGATIVE
OPIATES UR QL SCN>300 NG/ML: NORMAL
OXYCODONE UR QL SCN: NORMAL
PCP UR QL SCN>25 NG/ML: NORMAL
PH UR STRIP.AUTO: 7.5 [PH] (ref 5–8)
PH UR STRIP: 6 [PH]
PLATELET # BLD AUTO: 192 K/UL (ref 135–450)
PMV BLD AUTO: 9.4 FL (ref 5–10.5)
POTASSIUM SERPL-SCNC: 4 MMOL/L (ref 3.5–5.1)
PROT SERPL-MCNC: 6.9 G/DL (ref 6.4–8.2)
PROT UR STRIP.AUTO-MCNC: NEGATIVE MG/DL
RBC # BLD AUTO: 5.04 M/UL (ref 4.2–5.9)
SODIUM SERPL-SCNC: 141 MMOL/L (ref 136–145)
SP GR UR STRIP.AUTO: <=1.005 (ref 1–1.03)
UA COMPLETE W REFLEX CULTURE PNL UR: NORMAL
UA DIPSTICK W REFLEX MICRO PNL UR: NORMAL
URN SPEC COLLECT METH UR: NORMAL
UROBILINOGEN UR STRIP-ACNC: 1 E.U./DL
WBC # BLD AUTO: 8.3 K/UL (ref 4–11)

## 2025-03-24 PROCEDURE — 81003 URINALYSIS AUTO W/O SCOPE: CPT

## 2025-03-24 PROCEDURE — 2580000003 HC RX 258: Performed by: PHYSICIAN ASSISTANT

## 2025-03-24 PROCEDURE — 36415 COLL VENOUS BLD VENIPUNCTURE: CPT

## 2025-03-24 PROCEDURE — 85025 COMPLETE CBC W/AUTO DIFF WBC: CPT

## 2025-03-24 PROCEDURE — 2580000003 HC RX 258: Performed by: INTERNAL MEDICINE

## 2025-03-24 PROCEDURE — 85652 RBC SED RATE AUTOMATED: CPT

## 2025-03-24 PROCEDURE — 80307 DRUG TEST PRSMV CHEM ANLYZR: CPT

## 2025-03-24 PROCEDURE — 6360000002 HC RX W HCPCS: Performed by: NURSE PRACTITIONER

## 2025-03-24 PROCEDURE — 1200000000 HC SEMI PRIVATE

## 2025-03-24 PROCEDURE — 83735 ASSAY OF MAGNESIUM: CPT

## 2025-03-24 PROCEDURE — 86140 C-REACTIVE PROTEIN: CPT

## 2025-03-24 PROCEDURE — 99285 EMERGENCY DEPT VISIT HI MDM: CPT

## 2025-03-24 PROCEDURE — 82550 ASSAY OF CK (CPK): CPT

## 2025-03-24 PROCEDURE — 80053 COMPREHEN METABOLIC PANEL: CPT

## 2025-03-24 RX ORDER — SODIUM CHLORIDE 9 MG/ML
INJECTION, SOLUTION INTRAVENOUS PRN
Status: DISCONTINUED | OUTPATIENT
Start: 2025-03-24 | End: 2025-03-30 | Stop reason: HOSPADM

## 2025-03-24 RX ORDER — SODIUM CHLORIDE 0.9 % (FLUSH) 0.9 %
5-40 SYRINGE (ML) INJECTION EVERY 12 HOURS SCHEDULED
Status: DISCONTINUED | OUTPATIENT
Start: 2025-03-24 | End: 2025-03-30 | Stop reason: HOSPADM

## 2025-03-24 RX ORDER — ACETAMINOPHEN 650 MG/1
650 SUPPOSITORY RECTAL EVERY 6 HOURS PRN
Status: DISCONTINUED | OUTPATIENT
Start: 2025-03-24 | End: 2025-03-30 | Stop reason: HOSPADM

## 2025-03-24 RX ORDER — MORPHINE SULFATE 4 MG/ML
4 INJECTION, SOLUTION INTRAMUSCULAR; INTRAVENOUS ONCE
Refills: 0 | Status: COMPLETED | OUTPATIENT
Start: 2025-03-24 | End: 2025-03-24

## 2025-03-24 RX ORDER — 0.9 % SODIUM CHLORIDE 0.9 %
2000 INTRAVENOUS SOLUTION INTRAVENOUS ONCE
Status: COMPLETED | OUTPATIENT
Start: 2025-03-24 | End: 2025-03-24

## 2025-03-24 RX ORDER — SODIUM CHLORIDE 9 MG/ML
INJECTION, SOLUTION INTRAVENOUS CONTINUOUS
Status: ACTIVE | OUTPATIENT
Start: 2025-03-24 | End: 2025-03-26

## 2025-03-24 RX ORDER — ENOXAPARIN SODIUM 100 MG/ML
40 INJECTION SUBCUTANEOUS DAILY
Status: DISCONTINUED | OUTPATIENT
Start: 2025-03-24 | End: 2025-03-30 | Stop reason: HOSPADM

## 2025-03-24 RX ORDER — ACETAMINOPHEN 325 MG/1
650 TABLET ORAL EVERY 6 HOURS PRN
Status: DISCONTINUED | OUTPATIENT
Start: 2025-03-24 | End: 2025-03-30 | Stop reason: HOSPADM

## 2025-03-24 RX ORDER — POLYETHYLENE GLYCOL 3350 17 G/17G
17 POWDER, FOR SOLUTION ORAL DAILY PRN
Status: DISCONTINUED | OUTPATIENT
Start: 2025-03-24 | End: 2025-03-30 | Stop reason: HOSPADM

## 2025-03-24 RX ORDER — SODIUM CHLORIDE 0.9 % (FLUSH) 0.9 %
5-40 SYRINGE (ML) INJECTION PRN
Status: DISCONTINUED | OUTPATIENT
Start: 2025-03-24 | End: 2025-03-30 | Stop reason: HOSPADM

## 2025-03-24 RX ADMIN — SODIUM CHLORIDE 2000 ML: 0.9 INJECTION, SOLUTION INTRAVENOUS at 16:18

## 2025-03-24 RX ADMIN — SODIUM CHLORIDE: 9 INJECTION, SOLUTION INTRAVENOUS at 18:01

## 2025-03-24 RX ADMIN — MORPHINE SULFATE 4 MG: 4 INJECTION, SOLUTION INTRAMUSCULAR; INTRAVENOUS at 22:39

## 2025-03-24 ASSESSMENT — PAIN DESCRIPTION - ORIENTATION: ORIENTATION: RIGHT;LEFT

## 2025-03-24 ASSESSMENT — PAIN DESCRIPTION - LOCATION: LOCATION: ARM

## 2025-03-24 ASSESSMENT — PAIN - FUNCTIONAL ASSESSMENT: PAIN_FUNCTIONAL_ASSESSMENT: 0-10

## 2025-03-24 ASSESSMENT — PAIN SCALES - GENERAL
PAINLEVEL_OUTOF10: 7
PAINLEVEL_OUTOF10: 7
PAINLEVEL_OUTOF10: 3

## 2025-03-24 NOTE — ED NOTES
ED TO INPATIENT SBAR HANDOFF    Patient Name: Raffi Lux   :  2005  19 y.o.   MRN:  [id number]  Preferred Name  Raffi  ED Room #:  ED-0014/14  Family/Caregiver Present no   Restraints no   Sitter no   Sepsis Risk Score      Situation  Code Status: Prior No additional code details.    Allergies: Peanut-containing drug products and Seasonal  Weight: Patient Vitals for the past 96 hrs (Last 3 readings):   Weight   25 1328 65.4 kg (144 lb 3.2 oz)     Arrived from: home  Chief Complaint:   Chief Complaint   Patient presents with    OTHER     States possible rhabdo, had it back in October, c/o body and muscle pain.      Hospital Problem/Diagnosis:  Principal Problem:    Rhabdomyolysis  Resolved Problems:    * No resolved hospital problems. *    Imaging:   No orders to display     Abnormal labs:   Abnormal Labs Reviewed   COMPREHENSIVE METABOLIC PANEL W/ REFLEX TO MG FOR LOW K - Abnormal; Notable for the following components:       Result Value    BUN 6 (*)      (*)     All other components within normal limits   CK - Abnormal; Notable for the following components:    Total CK 12,797 (*)     All other components within normal limits     Critical values: yes     Abnormal Assessment Findings: CK value     Background  History:   Past Medical History:   Diagnosis Date    No pertinent past medical history        Assessment    Vitals/MEWS:    Level of Consciousness: Alert (0)   Vitals:    25 1328 25 1330 25 1549 25 1634   BP: (!) 149/75  127/61 117/68   Pulse: 65      Resp: 14      Temp:  98.3 °F (36.8 °C)     TempSrc:  Oral     SpO2: 100%  95% (!) 67%   Weight: 65.4 kg (144 lb 3.2 oz)        FiO2 (%): room air  O2 Flow Rate: O2 Device: None (Room air)    Cardiac Rhythm:    Pain Assessment: 0 [] Verbal [] Silver Baker Scale  Pain Scale: Pain Assessment  Pain Assessment: 0-10  Pain Level: 7  Last documented pain score (0-10 scale) Pain Level: 7  Last documented pain medication

## 2025-03-24 NOTE — H&P
Medications on File Prior to Encounter   Medication Sig Dispense Refill    hydrocortisone 2.5 % cream Apply topically 2 times daily. 453.6 g 1       Allergies:  Allergies   Allergen Reactions    Peanut-Containing Drug Products Itching    Seasonal         Social History:  Patient Lives at home   reports that he has never smoked. He has never used smokeless tobacco. He reports that he does not drink alcohol and does not use drugs.     Family History:  family history includes Diabetes in his maternal grandmother; No Known Problems in his father, mother, sister, sister, and sister. ,     Physical Exam:  /68   Pulse 65   Temp 98.3 °F (36.8 °C) (Oral)   Resp 14   Wt 65.4 kg (144 lb 3.2 oz)   SpO2 (!) 67%   BMI 21.24 kg/m²     General appearance:  Appears comfortable. AAOx3  HEENT: atraumatic, Pupils equal, muscous membranes moist, no masses appreciated  Cardiovascular: Regular rate and rhythm no murmurs appreciated  Respiratory: CTAB no wheezing  Gastrointestinal: Abdomen soft, non-tender, BS+  EXT bilateral arms tender to palpation  Neurology: no gross focal deficts  Psychiatry: Appropriate affect. Not agitated  Skin: Warm, dry, no rashes appreciated    Labs:  CBC:   Lab Results   Component Value Date/Time    WBC 8.3 03/24/2025 01:57 PM    RBC 5.04 03/24/2025 01:57 PM    HGB 14.6 03/24/2025 01:57 PM    HCT 42.2 03/24/2025 01:57 PM    MCV 83.8 03/24/2025 01:57 PM    MCH 28.9 03/24/2025 01:57 PM    MCHC 34.5 03/24/2025 01:57 PM    RDW 13.5 03/24/2025 01:57 PM     03/24/2025 01:57 PM    MPV 9.4 03/24/2025 01:57 PM     BMP:    Lab Results   Component Value Date/Time     03/24/2025 01:57 PM    K 4.0 03/24/2025 01:57 PM     03/24/2025 01:57 PM    CO2 24 03/24/2025 01:57 PM    BUN 6 03/24/2025 01:57 PM    CREATININE 0.9 03/24/2025 01:57 PM    CALCIUM 9.5 03/24/2025 01:57 PM    LABGLOM >90 03/24/2025 01:57 PM    LABGLOM >60 03/08/2024 04:39 AM    GLUCOSE 93 03/24/2025 01:57 PM     No orders to

## 2025-03-24 NOTE — ED NOTES
Patient Name: Raffi Lux  : 2005 19 y.o.  MRN: [id number]  ED Room #: ED-0014/14     Chief complaint:   Chief Complaint   Patient presents with    OTHER     States possible rhabdo, had it back in October, c/o body and muscle pain.      Hospital Problem/Diagnosis:   Hospital Problems           Last Modified POA    * (Principal) Rhabdomyolysis 2024 Yes         O2 Flow Rate:O2 Device: None (Room air)   (if applicable)  Cardiac Rhythm:   (if applicable)  Active LDA's:   Peripheral IV 25 Right Forearm (Active)   Site Assessment Clean, dry & intact 25 6425            How does patient ambulate? Low Fall Risk (Ambulates by themselves without support    2. How does patient take pills? Whole with Water    3. Is patient alert? Alert    4. Is patient oriented? To Person, To Place, To Time, and To Situation    5.   Patient arrived from:  home  Facility Name: ___________________________________________    6. If patient is disoriented or from a Skill Nursing Facility has family been notified of admission? No    7. Patient belongings? Belongings: Cell Phone, Wallet, and Clothing    Disposition of belongings? Kept with Patient     8. Any specific patient or family belongings/needs/dynamics?   a. N/A    9. Miscellaneous comments/pending orders?  a. IVF infusing       If there are any additional questions please reach out to the Emergency Department.

## 2025-03-24 NOTE — ED PROVIDER NOTES
[facility] EMERGENCY DEPARTMENT  EMERGENCY DEPARTMENT ENCOUNTER        Pt Name: Raffi Lux  MRN: [id number]  Birthdate 2005  Date of evaluation: 3/24/2025  Provider: Caleb Story PA-C  PCP: Se Herr MD  Note Started: 1:48 PM EDT 3/24/25      REYNA. I have evaluated this patient.      I personally saw the patient and independently provided 32 minutes of non-concurrent critical care time out of the total critical care time provided.  This excludes time spent doing separately billable procedures.  This includes time at the bedside, data interpretation, medication management, obtaining critical history from collateral sources if the patient is unable to provide it directly, and physician consultation.  Specifics of interventions taken and potentially life-threatening diagnostic considerations are listed above in the medical decision making.        CHIEF COMPLAINT       Chief Complaint   Patient presents with    OTHER     States possible rhabdo, had it back in October, c/o body and muscle pain.        HISTORY OF PRESENT ILLNESS: 1 or more Elements     History From: patient  Limitations to history : None    Raffi Lux is a 19 y.o. male who presents to the emergency department with a chief complaint of bilateral upper arm pain that goes into his sides.  This began a couple days ago and is gradually gotten worse.  He states that he did do a 35 pound 6 mile ruck walk over the weekend with drill along with some push-ups and workouts that he did not think was that bad.  Began having some pain that is gradual gotten worse and feels like his rhabdomyolysis that he had last October where he was admitted with a CK greater than 20,000.  Denies any rash, color change, swelling, direct injury or trauma to his arms, pain in his legs, shortness of breath, vomiting, fevers or any other symptoms.    Nursing Notes were all reviewed and agreed with or any disagreements were addressed in the HPI.    REVIEW

## 2025-03-25 LAB
ANION GAP SERPL CALCULATED.3IONS-SCNC: 9 MMOL/L (ref 3–16)
BASOPHILS # BLD: 0.1 K/UL (ref 0–0.2)
BASOPHILS NFR BLD: 1.1 %
BUN SERPL-MCNC: 6 MG/DL (ref 7–20)
CALCIUM SERPL-MCNC: 8.4 MG/DL (ref 8.3–10.6)
CHLORIDE SERPL-SCNC: 109 MMOL/L (ref 99–110)
CK SERPL-CCNC: ABNORMAL U/L (ref 39–308)
CO2 SERPL-SCNC: 23 MMOL/L (ref 21–32)
CREAT SERPL-MCNC: 0.9 MG/DL (ref 0.9–1.3)
DEPRECATED RDW RBC AUTO: 13 % (ref 12.4–15.4)
EOSINOPHIL # BLD: 0.2 K/UL (ref 0–0.6)
EOSINOPHIL NFR BLD: 3.2 %
GFR SERPLBLD CREATININE-BSD FMLA CKD-EPI: >90 ML/MIN/{1.73_M2}
GLUCOSE SERPL-MCNC: 112 MG/DL (ref 70–99)
HCT VFR BLD AUTO: 39.9 % (ref 40.5–52.5)
HGB BLD-MCNC: 13.5 G/DL (ref 13.5–17.5)
LDH SERPL L TO P-CCNC: 922 U/L (ref 100–190)
LYMPHOCYTES # BLD: 1.9 K/UL (ref 1–5.1)
LYMPHOCYTES NFR BLD: 30.4 %
MCH RBC QN AUTO: 29 PG (ref 26–34)
MCHC RBC AUTO-ENTMCNC: 33.7 G/DL (ref 31–36)
MCV RBC AUTO: 86 FL (ref 80–100)
MONOCYTES # BLD: 0.6 K/UL (ref 0–1.3)
MONOCYTES NFR BLD: 10.3 %
NEUTROPHILS # BLD: 3.4 K/UL (ref 1.7–7.7)
NEUTROPHILS NFR BLD: 55 %
PLATELET # BLD AUTO: 155 K/UL (ref 135–450)
PMV BLD AUTO: 8.7 FL (ref 5–10.5)
POTASSIUM SERPL-SCNC: 4.3 MMOL/L (ref 3.5–5.1)
RBC # BLD AUTO: 4.64 M/UL (ref 4.2–5.9)
SODIUM SERPL-SCNC: 141 MMOL/L (ref 136–145)
TSH SERPL DL<=0.005 MIU/L-ACNC: 4.45 UIU/ML (ref 0.27–4.2)
WBC # BLD AUTO: 6.2 K/UL (ref 4–11)

## 2025-03-25 PROCEDURE — 6370000000 HC RX 637 (ALT 250 FOR IP): Performed by: NURSE PRACTITIONER

## 2025-03-25 PROCEDURE — 80048 BASIC METABOLIC PNL TOTAL CA: CPT

## 2025-03-25 PROCEDURE — 82550 ASSAY OF CK (CPK): CPT

## 2025-03-25 PROCEDURE — 36415 COLL VENOUS BLD VENIPUNCTURE: CPT

## 2025-03-25 PROCEDURE — 2500000003 HC RX 250 WO HCPCS: Performed by: INTERNAL MEDICINE

## 2025-03-25 PROCEDURE — 2580000003 HC RX 258: Performed by: INTERNAL MEDICINE

## 2025-03-25 PROCEDURE — 84443 ASSAY THYROID STIM HORMONE: CPT

## 2025-03-25 PROCEDURE — 85025 COMPLETE CBC W/AUTO DIFF WBC: CPT

## 2025-03-25 PROCEDURE — 83615 LACTATE (LD) (LDH) ENZYME: CPT

## 2025-03-25 PROCEDURE — 1200000000 HC SEMI PRIVATE

## 2025-03-25 RX ORDER — ONDANSETRON 4 MG/1
4 TABLET, ORALLY DISINTEGRATING ORAL EVERY 8 HOURS PRN
Status: DISCONTINUED | OUTPATIENT
Start: 2025-03-25 | End: 2025-03-30 | Stop reason: HOSPADM

## 2025-03-25 RX ORDER — OXYCODONE HYDROCHLORIDE 5 MG/1
5 TABLET ORAL EVERY 4 HOURS PRN
Refills: 0 | Status: DISCONTINUED | OUTPATIENT
Start: 2025-03-25 | End: 2025-03-30 | Stop reason: HOSPADM

## 2025-03-25 RX ADMIN — SODIUM CHLORIDE, PRESERVATIVE FREE 10 ML: 5 INJECTION INTRAVENOUS at 21:14

## 2025-03-25 RX ADMIN — SODIUM CHLORIDE: 9 INJECTION, SOLUTION INTRAVENOUS at 15:58

## 2025-03-25 RX ADMIN — SODIUM CHLORIDE: 9 INJECTION, SOLUTION INTRAVENOUS at 03:16

## 2025-03-25 RX ADMIN — SODIUM CHLORIDE: 9 INJECTION, SOLUTION INTRAVENOUS at 21:18

## 2025-03-25 RX ADMIN — OXYCODONE HYDROCHLORIDE 5 MG: 5 TABLET ORAL at 10:52

## 2025-03-25 RX ADMIN — ONDANSETRON 4 MG: 4 TABLET, ORALLY DISINTEGRATING ORAL at 12:48

## 2025-03-25 ASSESSMENT — PAIN SCALES - GENERAL
PAINLEVEL_OUTOF10: 7
PAINLEVEL_OUTOF10: 7

## 2025-03-25 ASSESSMENT — PAIN DESCRIPTION - ORIENTATION
ORIENTATION: RIGHT;LEFT
ORIENTATION: LEFT;RIGHT

## 2025-03-25 ASSESSMENT — PAIN DESCRIPTION - LOCATION
LOCATION: OTHER (COMMENT)
LOCATION: ARM;SHOULDER

## 2025-03-25 NOTE — CARE COORDINATION
Discharge Planning Note:    Chart reviewed and it appears that patient has minimal needs for discharge at this time. Risk Score 5 %     Primary Care Physician is Se Herr MD  Primary insurance is [employer]     Please notify case management if any discharge needs are identified.      Case management will continue to follow progress and update discharge plan as needed.

## 2025-03-25 NOTE — CONSULTS
[facility]  Consultation Note    Reason for Consult: Rhabdomyolysis  Requesting Physician:  Dr. APRIL Bains    CHIEF COMPLAINT: Muscle pain    History obtained from records and patient.    HISTORY OF PRESENT ILLNESS:                  Raffi Lux  is 19 y.o., male with significant past medical history of rhabdomyolysis who presents with bilateral arm pain, radiating to the flanks.  During her 6 mile rock walk over the weekend along with some push-ups and workouts.  CK was greater than 2000 on presentation.  Currently greater than 22,000.  He denies any regular alcohol use or any drug abuse.  No discoloration of urine.  No vomiting or diarrhea.  No family history of muscular disorders.  No over-the-counter supplements or medications.  Works [employer].    Past Medical History:     has a past medical history of No pertinent past medical history.   Past Surgical History:     has a past surgical history that includes [place] tooth extraction.   Current Medications:    Current Facility-Administered Medications: oxyCODONE (ROXICODONE) immediate release tablet 5 mg, 5 mg, Oral, Q4H PRN  0.9 % sodium chloride infusion, , IntraVENous, Continuous  sodium chloride flush 0.9 % injection 5-40 mL, 5-40 mL, IntraVENous, 2 times per day  sodium chloride flush 0.9 % injection 5-40 mL, 5-40 mL, IntraVENous, PRN  0.9 % sodium chloride infusion, , IntraVENous, PRN  enoxaparin (LOVENOX) injection 40 mg, 40 mg, SubCUTAneous, Daily  polyethylene glycol (GLYCOLAX) packet 17 g, 17 g, Oral, Daily PRN  acetaminophen (TYLENOL) tablet 650 mg, 650 mg, Oral, Q6H PRN **OR** acetaminophen (TYLENOL) suppository 650 mg, 650 mg, Rectal, Q6H PRN  Allergies:    Peanut-containing drug products and Seasonal   Social History:     reports that he has never smoked. He has never used smokeless tobacco. He reports that he does not drink alcohol and does not use drugs.   Family History:   family history includes Diabetes

## 2025-03-26 ENCOUNTER — APPOINTMENT (OUTPATIENT)
Dept: PHYSICAL THERAPY | Age: 20
End: 2025-03-26
Payer: COMMERCIAL

## 2025-03-26 LAB
ANION GAP SERPL CALCULATED.3IONS-SCNC: 7 MMOL/L (ref 3–16)
BASOPHILS # BLD: 0.1 K/UL (ref 0–0.2)
BASOPHILS NFR BLD: 1.2 %
BUN SERPL-MCNC: 5 MG/DL (ref 7–20)
CALCIUM SERPL-MCNC: 8.6 MG/DL (ref 8.3–10.6)
CHLORIDE SERPL-SCNC: 108 MMOL/L (ref 99–110)
CK SERPL-CCNC: ABNORMAL U/L (ref 39–308)
CO2 SERPL-SCNC: 26 MMOL/L (ref 21–32)
CREAT SERPL-MCNC: 0.9 MG/DL (ref 0.9–1.3)
DEPRECATED RDW RBC AUTO: 12.9 % (ref 12.4–15.4)
ENA JO1 AB SER IA-ACNC: <0.2 AI (ref 0–0.9)
EOSINOPHIL # BLD: 0.2 K/UL (ref 0–0.6)
EOSINOPHIL NFR BLD: 4 %
GFR SERPLBLD CREATININE-BSD FMLA CKD-EPI: >90 ML/MIN/{1.73_M2}
GLUCOSE SERPL-MCNC: 105 MG/DL (ref 70–99)
HCT VFR BLD AUTO: 41.5 % (ref 40.5–52.5)
HGB BLD-MCNC: 14.2 G/DL (ref 13.5–17.5)
LYMPHOCYTES # BLD: 1.7 K/UL (ref 1–5.1)
LYMPHOCYTES NFR BLD: 31.1 %
MCH RBC QN AUTO: 28.9 PG (ref 26–34)
MCHC RBC AUTO-ENTMCNC: 34.3 G/DL (ref 31–36)
MCV RBC AUTO: 84.2 FL (ref 80–100)
MONOCYTES # BLD: 0.5 K/UL (ref 0–1.3)
MONOCYTES NFR BLD: 8.9 %
NEUTROPHILS # BLD: 3 K/UL (ref 1.7–7.7)
NEUTROPHILS NFR BLD: 54.8 %
PLATELET # BLD AUTO: 178 K/UL (ref 135–450)
PMV BLD AUTO: 8.7 FL (ref 5–10.5)
POTASSIUM SERPL-SCNC: 4.2 MMOL/L (ref 3.5–5.1)
RBC # BLD AUTO: 4.93 M/UL (ref 4.2–5.9)
SODIUM SERPL-SCNC: 141 MMOL/L (ref 136–145)
WBC # BLD AUTO: 5.5 K/UL (ref 4–11)

## 2025-03-26 PROCEDURE — 1200000000 HC SEMI PRIVATE

## 2025-03-26 PROCEDURE — 6360000002 HC RX W HCPCS: Performed by: INTERNAL MEDICINE

## 2025-03-26 PROCEDURE — 80048 BASIC METABOLIC PNL TOTAL CA: CPT

## 2025-03-26 PROCEDURE — 82550 ASSAY OF CK (CPK): CPT

## 2025-03-26 PROCEDURE — 2500000003 HC RX 250 WO HCPCS: Performed by: INTERNAL MEDICINE

## 2025-03-26 PROCEDURE — 2580000003 HC RX 258: Performed by: INTERNAL MEDICINE

## 2025-03-26 PROCEDURE — 85025 COMPLETE CBC W/AUTO DIFF WBC: CPT

## 2025-03-26 PROCEDURE — 86235 NUCLEAR ANTIGEN ANTIBODY: CPT

## 2025-03-26 RX ORDER — SODIUM CHLORIDE 9 MG/ML
INJECTION, SOLUTION INTRAVENOUS CONTINUOUS
Status: DISCONTINUED | OUTPATIENT
Start: 2025-03-26 | End: 2025-03-28

## 2025-03-26 RX ADMIN — SODIUM CHLORIDE: 9 INJECTION, SOLUTION INTRAVENOUS at 06:36

## 2025-03-26 RX ADMIN — SODIUM CHLORIDE, PRESERVATIVE FREE 10 ML: 5 INJECTION INTRAVENOUS at 22:49

## 2025-03-26 RX ADMIN — ENOXAPARIN SODIUM 40 MG: 100 INJECTION SUBCUTANEOUS at 05:09

## 2025-03-26 RX ADMIN — SODIUM CHLORIDE: 0.9 INJECTION, SOLUTION INTRAVENOUS at 11:44

## 2025-03-26 RX ADMIN — SODIUM CHLORIDE: 0.9 INJECTION, SOLUTION INTRAVENOUS at 21:02

## 2025-03-26 RX ADMIN — SODIUM CHLORIDE, PRESERVATIVE FREE 10 ML: 5 INJECTION INTRAVENOUS at 09:29

## 2025-03-26 RX ADMIN — SODIUM CHLORIDE: 9 INJECTION, SOLUTION INTRAVENOUS at 02:00

## 2025-03-26 ASSESSMENT — PAIN SCALES - GENERAL: PAINLEVEL_OUTOF10: 5

## 2025-03-26 ASSESSMENT — PAIN DESCRIPTION - LOCATION: LOCATION: ARM;SHOULDER

## 2025-03-26 ASSESSMENT — PAIN DESCRIPTION - ORIENTATION: ORIENTATION: RIGHT;LEFT

## 2025-03-26 ASSESSMENT — PAIN DESCRIPTION - DESCRIPTORS: DESCRIPTORS: SORE

## 2025-03-26 NOTE — PLAN OF CARE
Problem: Discharge Planning  Goal: Discharge to home or other facility with appropriate resources  3/26/2025 0920 by Nimco Medrano RN  Outcome: Progressing  3/26/2025 0150 by Nicki Irving RN  Outcome: Progressing  Flowsheets (Taken 3/25/2025 2215)  Discharge to home or other facility with appropriate resources: Refer to discharge planning if patient needs post-hospital services based on physician order or complex needs related to functional status, cognitive ability or social support system     Problem: Pain  Goal: Verbalizes/displays adequate comfort level or baseline comfort level  3/26/2025 0920 by Nimco Medrano RN  Outcome: Progressing  3/26/2025 0150 by Nicki Irving RN  Outcome: Progressing     Problem: Musculoskeletal - Adult  Goal: Return mobility to safest level of function  3/26/2025 0920 by Nimco Medrano RN  Outcome: Progressing  3/26/2025 0150 by Nicki Irving, RN  Outcome: Progressing

## 2025-03-26 NOTE — PLAN OF CARE
Problem: Discharge Planning  Goal: Discharge to home or other facility with appropriate resources  Outcome: Progressing  Flowsheets (Taken 3/25/2025 2215)  Discharge to home or other facility with appropriate resources: Refer to discharge planning if patient needs post-hospital services based on physician order or complex needs related to functional status, cognitive ability or social support system     Problem: Pain  Goal: Verbalizes/displays adequate comfort level or baseline comfort level  Outcome: Progressing     Problem: Musculoskeletal - Adult  Goal: Return mobility to safest level of function  Outcome: Progressing

## 2025-03-27 LAB
ANION GAP SERPL CALCULATED.3IONS-SCNC: 7 MMOL/L (ref 3–16)
BASOPHILS # BLD: 0.1 K/UL (ref 0–0.2)
BASOPHILS NFR BLD: 1.1 %
BUN SERPL-MCNC: 6 MG/DL (ref 7–20)
CALCIUM SERPL-MCNC: 9 MG/DL (ref 8.3–10.6)
CHLORIDE SERPL-SCNC: 105 MMOL/L (ref 99–110)
CK SERPL-CCNC: ABNORMAL U/L (ref 39–308)
CO2 SERPL-SCNC: 27 MMOL/L (ref 21–32)
CREAT SERPL-MCNC: 0.8 MG/DL (ref 0.9–1.3)
DEPRECATED RDW RBC AUTO: 12.7 % (ref 12.4–15.4)
EOSINOPHIL # BLD: 0.2 K/UL (ref 0–0.6)
EOSINOPHIL NFR BLD: 4.3 %
GFR SERPLBLD CREATININE-BSD FMLA CKD-EPI: >90 ML/MIN/{1.73_M2}
GLUCOSE SERPL-MCNC: 88 MG/DL (ref 70–99)
HCT VFR BLD AUTO: 40.3 % (ref 40.5–52.5)
HGB BLD-MCNC: 14 G/DL (ref 13.5–17.5)
LYMPHOCYTES # BLD: 1.7 K/UL (ref 1–5.1)
LYMPHOCYTES NFR BLD: 33 %
MCH RBC QN AUTO: 29.1 PG (ref 26–34)
MCHC RBC AUTO-ENTMCNC: 34.7 G/DL (ref 31–36)
MCV RBC AUTO: 84 FL (ref 80–100)
MONOCYTES # BLD: 0.5 K/UL (ref 0–1.3)
MONOCYTES NFR BLD: 9.4 %
NEUTROPHILS # BLD: 2.7 K/UL (ref 1.7–7.7)
NEUTROPHILS NFR BLD: 52.2 %
PLATELET # BLD AUTO: 177 K/UL (ref 135–450)
PMV BLD AUTO: 9 FL (ref 5–10.5)
POTASSIUM SERPL-SCNC: 4.2 MMOL/L (ref 3.5–5.1)
RBC # BLD AUTO: 4.79 M/UL (ref 4.2–5.9)
SODIUM SERPL-SCNC: 139 MMOL/L (ref 136–145)
WBC # BLD AUTO: 5.2 K/UL (ref 4–11)

## 2025-03-27 PROCEDURE — 6370000000 HC RX 637 (ALT 250 FOR IP): Performed by: INTERNAL MEDICINE

## 2025-03-27 PROCEDURE — 2500000003 HC RX 250 WO HCPCS: Performed by: INTERNAL MEDICINE

## 2025-03-27 PROCEDURE — 36415 COLL VENOUS BLD VENIPUNCTURE: CPT

## 2025-03-27 PROCEDURE — 82550 ASSAY OF CK (CPK): CPT

## 2025-03-27 PROCEDURE — 6370000000 HC RX 637 (ALT 250 FOR IP): Performed by: NURSE PRACTITIONER

## 2025-03-27 PROCEDURE — 1200000000 HC SEMI PRIVATE

## 2025-03-27 PROCEDURE — 80048 BASIC METABOLIC PNL TOTAL CA: CPT

## 2025-03-27 PROCEDURE — 85025 COMPLETE CBC W/AUTO DIFF WBC: CPT

## 2025-03-27 PROCEDURE — 2580000003 HC RX 258: Performed by: INTERNAL MEDICINE

## 2025-03-27 PROCEDURE — 94760 N-INVAS EAR/PLS OXIMETRY 1: CPT

## 2025-03-27 RX ADMIN — SODIUM CHLORIDE: 0.9 INJECTION, SOLUTION INTRAVENOUS at 03:46

## 2025-03-27 RX ADMIN — ONDANSETRON 4 MG: 4 TABLET, ORALLY DISINTEGRATING ORAL at 21:34

## 2025-03-27 RX ADMIN — SODIUM CHLORIDE: 0.9 INJECTION, SOLUTION INTRAVENOUS at 17:18

## 2025-03-27 RX ADMIN — SODIUM CHLORIDE, PRESERVATIVE FREE 10 ML: 5 INJECTION INTRAVENOUS at 07:49

## 2025-03-27 RX ADMIN — OXYCODONE HYDROCHLORIDE 5 MG: 5 TABLET ORAL at 21:31

## 2025-03-27 RX ADMIN — ACETAMINOPHEN 650 MG: 325 TABLET ORAL at 08:02

## 2025-03-27 ASSESSMENT — PAIN SCALES - GENERAL
PAINLEVEL_OUTOF10: 5
PAINLEVEL_OUTOF10: 0
PAINLEVEL_OUTOF10: 2
PAINLEVEL_OUTOF10: 6
PAINLEVEL_OUTOF10: 3
PAINLEVEL_OUTOF10: 6

## 2025-03-27 ASSESSMENT — PAIN DESCRIPTION - ORIENTATION: ORIENTATION: RIGHT;LEFT

## 2025-03-27 ASSESSMENT — PAIN DESCRIPTION - LOCATION
LOCATION: HIP
LOCATION: HEAD;SHOULDER

## 2025-03-27 ASSESSMENT — PAIN DESCRIPTION - DESCRIPTORS: DESCRIPTORS: ACHING

## 2025-03-27 ASSESSMENT — PAIN SCALES - WONG BAKER: WONGBAKER_NUMERICALRESPONSE: NO HURT

## 2025-03-27 NOTE — PLAN OF CARE
Problem: Discharge Planning  Goal: Discharge to home or other facility with appropriate resources  3/27/2025 0748 by Nimco Medrano RN  Outcome: Progressing  3/27/2025 0043 by Evangelina Claire RN  Outcome: Progressing     Problem: Pain  Goal: Verbalizes/displays adequate comfort level or baseline comfort level  3/27/2025 0748 by Nimco Medrano RN  Outcome: Progressing  3/27/2025 0043 by Evangelina Claire RN  Outcome: Progressing     Problem: Musculoskeletal - Adult  Goal: Return mobility to safest level of function  3/27/2025 0748 by Nimco Medrano RN  Outcome: Progressing  3/27/2025 0043 by Evangelina Claire RN  Outcome: Progressing

## 2025-03-27 NOTE — PLAN OF CARE
Problem: Discharge Planning  Goal: Discharge to home or other facility with appropriate resources  Outcome: Progressing     Problem: Pain  Goal: Verbalizes/displays adequate comfort level or baseline comfort level  Outcome: Progressing     Problem: Musculoskeletal - Adult  Goal: Return mobility to safest level of function  Outcome: Progressing

## 2025-03-28 ENCOUNTER — APPOINTMENT (OUTPATIENT)
Dept: PHYSICAL THERAPY | Age: 20
End: 2025-03-28
Payer: COMMERCIAL

## 2025-03-28 LAB
ANION GAP SERPL CALCULATED.3IONS-SCNC: 9 MMOL/L (ref 3–16)
BASOPHILS # BLD: 0.1 K/UL (ref 0–0.2)
BASOPHILS NFR BLD: 1.3 %
BUN SERPL-MCNC: 7 MG/DL (ref 7–20)
CALCIUM SERPL-MCNC: 8.7 MG/DL (ref 8.3–10.6)
CHLORIDE SERPL-SCNC: 106 MMOL/L (ref 99–110)
CK SERPL-CCNC: ABNORMAL U/L (ref 39–308)
CO2 SERPL-SCNC: 27 MMOL/L (ref 21–32)
CREAT SERPL-MCNC: 0.9 MG/DL (ref 0.9–1.3)
DEPRECATED RDW RBC AUTO: 12.6 % (ref 12.4–15.4)
EOSINOPHIL # BLD: 0.2 K/UL (ref 0–0.6)
EOSINOPHIL NFR BLD: 4.1 %
GFR SERPLBLD CREATININE-BSD FMLA CKD-EPI: >90 ML/MIN/{1.73_M2}
GLUCOSE SERPL-MCNC: 90 MG/DL (ref 70–99)
HCT VFR BLD AUTO: 39.5 % (ref 40.5–52.5)
HGB BLD-MCNC: 13.6 G/DL (ref 13.5–17.5)
LYMPHOCYTES # BLD: 1.8 K/UL (ref 1–5.1)
LYMPHOCYTES NFR BLD: 29.5 %
MCH RBC QN AUTO: 29 PG (ref 26–34)
MCHC RBC AUTO-ENTMCNC: 34.5 G/DL (ref 31–36)
MCV RBC AUTO: 84.3 FL (ref 80–100)
MONOCYTES # BLD: 0.6 K/UL (ref 0–1.3)
MONOCYTES NFR BLD: 10.7 %
NEUTROPHILS # BLD: 3.3 K/UL (ref 1.7–7.7)
NEUTROPHILS NFR BLD: 54.4 %
PLATELET # BLD AUTO: 177 K/UL (ref 135–450)
PMV BLD AUTO: 8.4 FL (ref 5–10.5)
POTASSIUM SERPL-SCNC: 3.8 MMOL/L (ref 3.5–5.1)
RBC # BLD AUTO: 4.69 M/UL (ref 4.2–5.9)
SODIUM SERPL-SCNC: 142 MMOL/L (ref 136–145)
WBC # BLD AUTO: 6 K/UL (ref 4–11)

## 2025-03-28 PROCEDURE — 2580000003 HC RX 258: Performed by: NURSE PRACTITIONER

## 2025-03-28 PROCEDURE — 85025 COMPLETE CBC W/AUTO DIFF WBC: CPT

## 2025-03-28 PROCEDURE — 2500000003 HC RX 250 WO HCPCS: Performed by: INTERNAL MEDICINE

## 2025-03-28 PROCEDURE — 1200000000 HC SEMI PRIVATE

## 2025-03-28 PROCEDURE — 82550 ASSAY OF CK (CPK): CPT

## 2025-03-28 PROCEDURE — 80048 BASIC METABOLIC PNL TOTAL CA: CPT

## 2025-03-28 PROCEDURE — 2580000003 HC RX 258: Performed by: INTERNAL MEDICINE

## 2025-03-28 RX ORDER — SODIUM CHLORIDE 9 MG/ML
INJECTION, SOLUTION INTRAVENOUS CONTINUOUS
Status: DISCONTINUED | OUTPATIENT
Start: 2025-03-28 | End: 2025-03-30 | Stop reason: HOSPADM

## 2025-03-28 RX ADMIN — SODIUM CHLORIDE: 0.9 INJECTION, SOLUTION INTRAVENOUS at 21:46

## 2025-03-28 RX ADMIN — SODIUM CHLORIDE, PRESERVATIVE FREE 10 ML: 5 INJECTION INTRAVENOUS at 20:28

## 2025-03-28 RX ADMIN — SODIUM CHLORIDE: 0.9 INJECTION, SOLUTION INTRAVENOUS at 00:04

## 2025-03-28 RX ADMIN — SODIUM CHLORIDE: 0.9 INJECTION, SOLUTION INTRAVENOUS at 12:51

## 2025-03-28 ASSESSMENT — PAIN SCALES - GENERAL
PAINLEVEL_OUTOF10: 2
PAINLEVEL_OUTOF10: 2
PAINLEVEL_OUTOF10: 3

## 2025-03-28 ASSESSMENT — PAIN DESCRIPTION - LOCATION
LOCATION: SHOULDER

## 2025-03-28 NOTE — PLAN OF CARE
Shift assessment completed and charted. VSS. A/o x4. Bed locked and in lowest position, call light within reach. SpO2 > 90% on room air. RN encouraged PO hydration and ambulating. IV fluids infusing per MAR. Patient stable and denied needs when writer left room.    Problem: Discharge Planning  Goal: Discharge to home or other facility with appropriate resources  Outcome: Progressing  Flowsheets (Taken 3/27/2025 2130 by Nicki Irving, RN)  Discharge to home or other facility with appropriate resources: Refer to discharge planning if patient needs post-hospital services based on physician order or complex needs related to functional status, cognitive ability or social support system     Problem: Pain  Goal: Verbalizes/displays adequate comfort level or baseline comfort level  Outcome: Progressing     Problem: Musculoskeletal - Adult  Goal: Return mobility to safest level of function  Outcome: Progressing  Flowsheets (Taken 3/27/2025 2130 by Nicki Irving, RN)  Return Mobility to Safest Level of Function: Assess patient stability and activity tolerance for standing, transferring and ambulating with or without assistive devices

## 2025-03-29 LAB
ANION GAP SERPL CALCULATED.3IONS-SCNC: 10 MMOL/L (ref 3–16)
BASOPHILS # BLD: 0.1 K/UL (ref 0–0.2)
BASOPHILS NFR BLD: 1.1 %
BUN SERPL-MCNC: 11 MG/DL (ref 7–20)
CALCIUM SERPL-MCNC: 8.9 MG/DL (ref 8.3–10.6)
CHLORIDE SERPL-SCNC: 104 MMOL/L (ref 99–110)
CK SERPL-CCNC: 7879 U/L (ref 39–308)
CK SERPL-CCNC: ABNORMAL U/L (ref 39–308)
CO2 SERPL-SCNC: 25 MMOL/L (ref 21–32)
CREAT SERPL-MCNC: 0.9 MG/DL (ref 0.9–1.3)
DEPRECATED RDW RBC AUTO: 12.9 % (ref 12.4–15.4)
EOSINOPHIL # BLD: 0.2 K/UL (ref 0–0.6)
EOSINOPHIL NFR BLD: 3.6 %
GFR SERPLBLD CREATININE-BSD FMLA CKD-EPI: >90 ML/MIN/{1.73_M2}
GLUCOSE SERPL-MCNC: 91 MG/DL (ref 70–99)
HCT VFR BLD AUTO: 40 % (ref 40.5–52.5)
HGB BLD-MCNC: 13.8 G/DL (ref 13.5–17.5)
LYMPHOCYTES # BLD: 1.7 K/UL (ref 1–5.1)
LYMPHOCYTES NFR BLD: 26.1 %
MCH RBC QN AUTO: 28.9 PG (ref 26–34)
MCHC RBC AUTO-ENTMCNC: 34.6 G/DL (ref 31–36)
MCV RBC AUTO: 83.6 FL (ref 80–100)
MONOCYTES # BLD: 0.6 K/UL (ref 0–1.3)
MONOCYTES NFR BLD: 10.2 %
NEUTROPHILS # BLD: 3.8 K/UL (ref 1.7–7.7)
NEUTROPHILS NFR BLD: 59 %
PLATELET # BLD AUTO: 188 K/UL (ref 135–450)
PMV BLD AUTO: 8.6 FL (ref 5–10.5)
POTASSIUM SERPL-SCNC: 3.8 MMOL/L (ref 3.5–5.1)
RBC # BLD AUTO: 4.79 M/UL (ref 4.2–5.9)
SODIUM SERPL-SCNC: 139 MMOL/L (ref 136–145)
WBC # BLD AUTO: 6.4 K/UL (ref 4–11)

## 2025-03-29 PROCEDURE — 1200000000 HC SEMI PRIVATE

## 2025-03-29 PROCEDURE — 2580000003 HC RX 258: Performed by: INTERNAL MEDICINE

## 2025-03-29 PROCEDURE — 2500000003 HC RX 250 WO HCPCS: Performed by: INTERNAL MEDICINE

## 2025-03-29 PROCEDURE — 36415 COLL VENOUS BLD VENIPUNCTURE: CPT

## 2025-03-29 PROCEDURE — 82550 ASSAY OF CK (CPK): CPT

## 2025-03-29 PROCEDURE — 85025 COMPLETE CBC W/AUTO DIFF WBC: CPT

## 2025-03-29 PROCEDURE — 80048 BASIC METABOLIC PNL TOTAL CA: CPT

## 2025-03-29 PROCEDURE — 6370000000 HC RX 637 (ALT 250 FOR IP): Performed by: NURSE PRACTITIONER

## 2025-03-29 RX ADMIN — OXYCODONE HYDROCHLORIDE 5 MG: 5 TABLET ORAL at 21:08

## 2025-03-29 RX ADMIN — ONDANSETRON 4 MG: 4 TABLET, ORALLY DISINTEGRATING ORAL at 21:08

## 2025-03-29 RX ADMIN — SODIUM CHLORIDE: 0.9 INJECTION, SOLUTION INTRAVENOUS at 16:09

## 2025-03-29 RX ADMIN — SODIUM CHLORIDE, PRESERVATIVE FREE 10 ML: 5 INJECTION INTRAVENOUS at 21:10

## 2025-03-29 RX ADMIN — SODIUM CHLORIDE: 0.9 INJECTION, SOLUTION INTRAVENOUS at 23:32

## 2025-03-29 ASSESSMENT — PAIN SCALES - GENERAL
PAINLEVEL_OUTOF10: 1
PAINLEVEL_OUTOF10: 0
PAINLEVEL_OUTOF10: 5

## 2025-03-29 ASSESSMENT — PAIN DESCRIPTION - DESCRIPTORS: DESCRIPTORS: ACHING

## 2025-03-29 ASSESSMENT — PAIN DESCRIPTION - ORIENTATION: ORIENTATION: LEFT

## 2025-03-29 ASSESSMENT — PAIN DESCRIPTION - LOCATION: LOCATION: SHOULDER;ARM

## 2025-03-30 VITALS
HEART RATE: 54 BPM | WEIGHT: 144.2 LBS | BODY MASS INDEX: 21.24 KG/M2 | RESPIRATION RATE: 16 BRPM | DIASTOLIC BLOOD PRESSURE: 79 MMHG | SYSTOLIC BLOOD PRESSURE: 128 MMHG | TEMPERATURE: 98.1 F | OXYGEN SATURATION: 96 %

## 2025-03-30 LAB — CK SERPL-CCNC: 4543 U/L (ref 39–308)

## 2025-03-30 PROCEDURE — 82550 ASSAY OF CK (CPK): CPT

## 2025-03-30 ASSESSMENT — PAIN SCALES - GENERAL: PAINLEVEL_OUTOF10: 0

## 2025-03-30 NOTE — PROGRESS NOTES
[facility] Note    Patient Active Problem List   Diagnosis    Rhabdomyolysis    Major depressive disorder, recurrent    Suicide attempt (HCC)    Sensorineural hearing loss (SNHL) of both ears    Eczema, dyshidrotic    Chronic left shoulder pain    Other chest pain       Past Medical History:   has a past medical history of No pertinent past medical history.    Past Social History:   reports that he has never smoked. He has never used smokeless tobacco. He reports that he does not drink alcohol and does not use drugs.    Subjective:    On IVF, good uop.     Review of Systems   Constitutional:  Negative for activity change, appetite change, chills, fatigue, fever and unexpected weight change.   HENT:  Negative for congestion and facial swelling.    Eyes:  Negative for photophobia, discharge and redness.   Respiratory:  Negative for cough, chest tightness and shortness of breath.    Cardiovascular:  Negative for chest pain, palpitations and leg swelling.   Gastrointestinal:  Negative for abdominal distention, abdominal pain, blood in stool, constipation, diarrhea, nausea and vomiting.   Endocrine: Negative for cold intolerance, heat intolerance and polyuria.   Genitourinary:  Negative for decreased urine volume, difficulty urinating, flank pain and hematuria.   Musculoskeletal:  Negative for joint swelling and neck pain.   Neurological:  Negative for dizziness, seizures, syncope, speech difficulty, light-headedness and headaches.   Hematological:  Does not bruise/bleed easily.   Psychiatric/Behavioral:  Negative for agitation, confusion and hallucinations.      Objective:    /63   Pulse 53   Temp 97.8 °F (36.6 °C) (Oral)   Resp 16   Wt 65.4 kg (144 lb 3.2 oz)   SpO2 94%   BMI 21.24 kg/m²     Wt Readings from Last 3 Encounters:   03/24/25 65.4 kg (144 lb 3.2 oz)   03/11/25 64 kg (141 lb)   11/20/24 65 kg (143 lb 6.4 oz)       BP Readings from Last 3 Encounters:   03/29/25 111/63 
              [facility] Note    Patient Active Problem List   Diagnosis    Rhabdomyolysis    Major depressive disorder, recurrent    Suicide attempt (HCC)    Sensorineural hearing loss (SNHL) of both ears    Eczema, dyshidrotic    Chronic left shoulder pain    Other chest pain       Past Medical History:   has a past medical history of No pertinent past medical history.    Past Social History:   reports that he has never smoked. He has never used smokeless tobacco. He reports that he does not drink alcohol and does not use drugs.    Subjective:    Urinating well    Review of Systems   Constitutional:  Negative for activity change, appetite change, chills, fatigue, fever and unexpected weight change.   HENT:  Negative for congestion and facial swelling.    Eyes:  Negative for photophobia, discharge and redness.   Respiratory:  Negative for cough, chest tightness and shortness of breath.    Cardiovascular:  Negative for chest pain, palpitations and leg swelling.   Gastrointestinal:  Negative for abdominal distention, abdominal pain, blood in stool, constipation, diarrhea, nausea and vomiting.   Endocrine: Negative for cold intolerance, heat intolerance and polyuria.   Genitourinary:  Negative for decreased urine volume, difficulty urinating, flank pain and hematuria.   Musculoskeletal:  Negative for joint swelling and neck pain.   Neurological:  Negative for dizziness, seizures, syncope, speech difficulty, light-headedness and headaches.   Hematological:  Does not bruise/bleed easily.   Psychiatric/Behavioral:  Negative for agitation, confusion and hallucinations.      Objective:    /60   Pulse 51   Temp 97.9 °F (36.6 °C) (Oral)   Resp 16   Wt 65.4 kg (144 lb 3.2 oz)   SpO2 98%   BMI 21.24 kg/m²     Wt Readings from Last 3 Encounters:   03/24/25 65.4 kg (144 lb 3.2 oz)   03/11/25 64 kg (141 lb)   11/20/24 65 kg (143 lb 6.4 oz)       BP Readings from Last 3 Encounters:   03/28/25 125/60 
              [facility] Note    Patient Active Problem List   Diagnosis    Rhabdomyolysis    Major depressive disorder, recurrent    Suicide attempt (HCC)    Sensorineural hearing loss (SNHL) of both ears    Eczema, dyshidrotic    Chronic left shoulder pain    Other chest pain       Past Medical History:   has a past medical history of No pertinent past medical history.    Past Social History:   reports that he has never smoked. He has never used smokeless tobacco. He reports that he does not drink alcohol and does not use drugs.    Subjective:    Urinating well    Review of Systems   Constitutional:  Negative for activity change, appetite change, chills, fatigue, fever and unexpected weight change.   HENT:  Negative for congestion and facial swelling.    Eyes:  Negative for photophobia, discharge and redness.   Respiratory:  Negative for cough, chest tightness and shortness of breath.    Cardiovascular:  Negative for chest pain, palpitations and leg swelling.   Gastrointestinal:  Negative for abdominal distention, abdominal pain, blood in stool, constipation, diarrhea, nausea and vomiting.   Endocrine: Negative for cold intolerance, heat intolerance and polyuria.   Genitourinary:  Negative for decreased urine volume, difficulty urinating, flank pain and hematuria.   Musculoskeletal:  Negative for joint swelling and neck pain.   Neurological:  Negative for dizziness, seizures, syncope, speech difficulty, light-headedness and headaches.   Hematological:  Does not bruise/bleed easily.   Psychiatric/Behavioral:  Negative for agitation, confusion and hallucinations.      Objective:    /77   Pulse 61   Temp 97.8 °F (36.6 °C) (Oral)   Resp 16   Wt 65.4 kg (144 lb 3.2 oz)   SpO2 99%   BMI 21.24 kg/m²     Wt Readings from Last 3 Encounters:   03/24/25 65.4 kg (144 lb 3.2 oz)   03/11/25 64 kg (141 lb)   11/20/24 65 kg (143 lb 6.4 oz)       BP Readings from Last 3 Encounters:   03/27/25 128/77 
              [facility] Note    Patient Active Problem List   Diagnosis    Rhabdomyolysis    Major depressive disorder, recurrent    Suicide attempt (HCC)    Sensorineural hearing loss (SNHL) of both ears    Eczema, dyshidrotic    Chronic left shoulder pain    Other chest pain       Past Medical History:   has a past medical history of No pertinent past medical history.    Past Social History:   reports that he has never smoked. He has never used smokeless tobacco. He reports that he does not drink alcohol and does not use drugs.    Subjective:    Feeling well, no new complaints.    Review of Systems   Constitutional:  Negative for activity change, appetite change, chills, fatigue, fever and unexpected weight change.   HENT:  Negative for congestion and facial swelling.    Eyes:  Negative for photophobia, discharge and redness.   Respiratory:  Negative for cough, chest tightness and shortness of breath.    Cardiovascular:  Negative for chest pain, palpitations and leg swelling.   Gastrointestinal:  Negative for abdominal distention, abdominal pain, blood in stool, constipation, diarrhea, nausea and vomiting.   Endocrine: Negative for cold intolerance, heat intolerance and polyuria.   Genitourinary:  Negative for decreased urine volume, difficulty urinating, flank pain and hematuria.   Musculoskeletal:  Negative for joint swelling and neck pain.   Neurological:  Negative for dizziness, seizures, syncope, speech difficulty, light-headedness and headaches.   Hematological:  Does not bruise/bleed easily.   Psychiatric/Behavioral:  Negative for agitation, confusion and hallucinations.      Objective:    BP (!) 102/57   Pulse 52   Temp 97.7 °F (36.5 °C) (Oral)   Resp 16   Wt 65.4 kg (144 lb 3.2 oz)   SpO2 97%   BMI 21.24 kg/m²     Wt Readings from Last 3 Encounters:   03/24/25 65.4 kg (144 lb 3.2 oz)   03/11/25 64 kg (141 lb)   11/20/24 65 kg (143 lb 6.4 oz)       BP Readings from Last 3 Encounters: 
        [facility]    3/28/2025 7:34 AM        Name: Raffi Lux .              Admitted: 3/24/2025  Primary Care Provider: Se Herr MD (Tel: [phone])      Subjective:  .    Admitted with Rhabdo  Seen this am while resting in the bed with RN at bedside.    Tolerating a diet.  PO fluids encouraged   Clinically he feels better today , reports less shoulder pain   Has been walking in halls and taking showers     Labs reviewed in detail.    Summary:  Pt [place] [employer].  Did a 6 mile walk carrying a 35 pound back pack prior to onset of symptoms.  Walk took 2 hours     Hx of previous Rhabdomyolysis in October 2024 r/t flu shot?    Reviewed interval ancillary notes    Current Medications  0.9 % sodium chloride infusion, Continuous  oxyCODONE (ROXICODONE) immediate release tablet 5 mg, Q4H PRN  ondansetron (ZOFRAN-ODT) disintegrating tablet 4 mg, Q8H PRN  sodium chloride flush 0.9 % injection 5-40 mL, 2 times per day  sodium chloride flush 0.9 % injection 5-40 mL, PRN  0.9 % sodium chloride infusion, PRN  enoxaparin (LOVENOX) injection 40 mg, Daily  polyethylene glycol (GLYCOLAX) packet 17 g, Daily PRN  acetaminophen (TYLENOL) tablet 650 mg, Q6H PRN   Or  acetaminophen (TYLENOL) suppository 650 mg, Q6H PRN        Objective:  BP (!) 92/54   Pulse 55   Temp 97.7 °F (36.5 °C) (Oral)   Resp 16   Wt 65.4 kg (144 lb 3.2 oz)   SpO2 98%   BMI 21.24 kg/m²     Intake/Output Summary (Last 24 hours) at 3/28/2025 0734  Last data filed at 3/27/2025 1719  Gross per 24 hour   Intake 240 ml   Output 425 ml   Net -185 ml        Wt Readings from Last 3 Encounters:   03/24/25 65.4 kg (144 lb 3.2 oz)   03/11/25 64 kg (141 lb)   11/20/24 65 kg (143 lb 6.4 oz)       General appearance:  Appears comfortable, alert and pleasant   Eyes: Sclera clear. Pupils equal.  ENT: Moist oral mucosa. Trachea midline, no adenopathy.  Cardiovascular: Regular rhythm, normal S1, S2. No murmur. No edema 
        [facility]    3/26/2025 7:20 AM        Name: Raffi Lux .              Admitted: 3/24/2025  Primary Care Provider: Se Herr MD (Tel: [phone])      Subjective:  .    Admitted with Rhabdo  CK rising, now up to    39, 524  Seen this am while resting in the bed  Clinically he feels better today , reports less shoulder pain     Labs reviewed in detail.    Pt is [employer].  Did a 6 mile walk carrying a 35 pound back pack prior to onset of symptoms.  Walk took 2 hours     Hx of previous Rhabdomyolysis in October 2024 r/t flu shot?    Reviewed interval ancillary notes    Current Medications  oxyCODONE (ROXICODONE) immediate release tablet 5 mg, Q4H PRN  ondansetron (ZOFRAN-ODT) disintegrating tablet 4 mg, Q8H PRN  0.9 % sodium chloride infusion, Continuous  sodium chloride flush 0.9 % injection 5-40 mL, 2 times per day  sodium chloride flush 0.9 % injection 5-40 mL, PRN  0.9 % sodium chloride infusion, PRN  enoxaparin (LOVENOX) injection 40 mg, Daily  polyethylene glycol (GLYCOLAX) packet 17 g, Daily PRN  acetaminophen (TYLENOL) tablet 650 mg, Q6H PRN   Or  acetaminophen (TYLENOL) suppository 650 mg, Q6H PRN        Objective:  /79   Pulse 60   Temp 97.7 °F (36.5 °C) (Oral)   Resp 16   Wt 65.4 kg (144 lb 3.2 oz)   SpO2 98%   BMI 21.24 kg/m²     Intake/Output Summary (Last 24 hours) at 3/26/2025 0720  Last data filed at 3/26/2025 0500  Gross per 24 hour   Intake 400 ml   Output 3400 ml   Net -3000 ml      Wt Readings from Last 3 Encounters:   03/24/25 65.4 kg (144 lb 3.2 oz)   03/11/25 64 kg (141 lb)   11/20/24 65 kg (143 lb 6.4 oz)       General appearance:  Appears comfortable, alert and pleasant   Eyes: Sclera clear. Pupils equal.  ENT: Moist oral mucosa. Trachea midline, no adenopathy.  Cardiovascular: Regular rhythm, normal S1, S2. No murmur. No edema in lower extremities  Respiratory: Not using accessory muscles. Good inspiratory effort. 
        [facility]    3/25/2025 7:44 AM        Name: Raffi Lux .              Admitted: 3/24/2025  Primary Care Provider: Se Herr MD (Tel: [phone])      Subjective:  .    Admitted with Rhabdo  CK rising, now up to > 22K   Seen this am while resting in the bed  Reports bilat shoulder pain , currently at level 6 -7   Labs reviewed in detail.     Pt is [employer].  Did a 6 mile walk carrying a 35 pound back pack prior to onset of symptoms.  Walk took 2 hours     Hx of previous Rhabdomyolysis in October 2024 r/t flu shot?    Reviewed interval ancillary notes    Current Medications  0.9 % sodium chloride infusion, Continuous  sodium chloride flush 0.9 % injection 5-40 mL, 2 times per day  sodium chloride flush 0.9 % injection 5-40 mL, PRN  0.9 % sodium chloride infusion, PRN  enoxaparin (LOVENOX) injection 40 mg, Daily  polyethylene glycol (GLYCOLAX) packet 17 g, Daily PRN  acetaminophen (TYLENOL) tablet 650 mg, Q6H PRN   Or  acetaminophen (TYLENOL) suppository 650 mg, Q6H PRN        Objective:  /62   Pulse 62   Temp 97.8 °F (36.6 °C) (Oral)   Resp 16   Wt 65.4 kg (144 lb 3.2 oz)   SpO2 98%   BMI 21.24 kg/m²     Intake/Output Summary (Last 24 hours) at 3/25/2025 0744  Last data filed at 3/25/2025 0300  Gross per 24 hour   Intake 200 ml   Output 600 ml   Net -400 ml      Wt Readings from Last 3 Encounters:   03/24/25 65.4 kg (144 lb 3.2 oz)   03/11/25 64 kg (141 lb)   11/20/24 65 kg (143 lb 6.4 oz)       General appearance:  Appears comfortable, alert and pleasant   Eyes: Sclera clear. Pupils equal.  ENT: Moist oral mucosa. Trachea midline, no adenopathy.  Cardiovascular: Regular rhythm, normal S1, S2. No murmur. No edema in lower extremities  Respiratory: Not using accessory muscles. Good inspiratory effort. Clear to auscultation bilaterally, no wheeze or crackles.   GI: Abdomen soft, no tenderness, not distended, normal bowel 
        [facility]    3/29/2025 7:24 AM        Name: Raffi Lux .              Admitted: 3/24/2025  Primary Care Provider: Se Herr MD (Tel: [phone])      Subjective:  .    Admitted with Rhabdo  CK is falling rapidly now.  Needed new IV this am  PO fluids encouraged.    [birth year] his [birth year]      Clinically he feels better every day     Labs reviewed in detail.    Summary:  Pt is [employer].  Did a 6 mile walk carrying a 35 pound back pack prior to onset of symptoms.  Walk took 2 hours     Hx of previous Rhabdomyolysis in October 2024 r/t flu shot?    Reviewed interval ancillary notes    Current Medications  0.9 % sodium chloride infusion, Continuous  oxyCODONE (ROXICODONE) immediate release tablet 5 mg, Q4H PRN  ondansetron (ZOFRAN-ODT) disintegrating tablet 4 mg, Q8H PRN  sodium chloride flush 0.9 % injection 5-40 mL, 2 times per day  sodium chloride flush 0.9 % injection 5-40 mL, PRN  0.9 % sodium chloride infusion, PRN  enoxaparin (LOVENOX) injection 40 mg, Daily  polyethylene glycol (GLYCOLAX) packet 17 g, Daily PRN  acetaminophen (TYLENOL) tablet 650 mg, Q6H PRN   Or  acetaminophen (TYLENOL) suppository 650 mg, Q6H PRN        Objective:  /63   Pulse 53   Temp 97.8 °F (36.6 °C) (Oral)   Resp 16   Wt 65.4 kg (144 lb 3.2 oz)   SpO2 94%   BMI 21.24 kg/m²     Intake/Output Summary (Last 24 hours) at 3/29/2025 0724  Last data filed at 3/28/2025 1249  Gross per 24 hour   Intake 360 ml   Output --   Net 360 ml        Wt Readings from Last 3 Encounters:   03/24/25 65.4 kg (144 lb 3.2 oz)   03/11/25 64 kg (141 lb)   11/20/24 65 kg (143 lb 6.4 oz)       General appearance:  Appears comfortable, alert and pleasant   Eyes: Sclera clear. Pupils equal.  ENT: Moist oral mucosa. Trachea midline, no adenopathy.  Cardiovascular: Regular rhythm, normal S1, S2. No murmur. No edema in lower extremities  Respiratory: Not using accessory muscles. Good inspiratory 
9:18 AM  Morning assessment completed, patient denies needs at this time, call light in reach.  Will continue to monitor. The care plan and education has been reviewed and mutually agreed upon with the patient.   12:19 PM  Pt is alert and oriented x4. Able to follow commands throughout discharge. Pt discharged home with paperwork and belongings. Verbalized understanding of instructions and complications. Verbalized when and why to follow up with MD. No questions when prompted for them to voice concern or to clarify any confusion. Pt satisfied with care and verbalized an understanding of what to expect at home. Patient discharged to home with all belongings.     
Alert and oriented X 4. Complained of shoulder and arm pain. PRN pain med given see MAR. Patient monitored during the shift, no immediate concerns noted. Call light within reach  
Alert and oriented X 4. Denies pain. On continuous IV NS. Encouraged to ambulate and hydrate orally. Call light within reach. The care plan and education reviewed with patient and mutually agreed upon  
LUE IV infiltrated and swelling, ice applied and arm is elevated. Pt declines new IV placement stating this is his second IV for the night and his arm hurts. Pt is educated on importance of IV fluids in regards to rhabdomyolysis, pt still declines IV placement now but agrees for another placement later in the morning. Pt is encourage to hydrate, water pitcher is filled up. Will monitor pt  
PM assessment complete, vitals stable, medications given per MAR, patient ambulating independently in the hallway. The care plan and education has been reviewed and mutually agreed upon with the patient, call light within reach  
PM assessment complete, vitals stable, medications given per MAR, patient ambulating independently, denies pain intervention at this time. The care plan and education has been reviewed and mutually agreed upon with the patient, call light within reach.  
PM assessment complete, vitals stable, medications given per MAR, patient ambulatory in the room. Patient complaining of arm pain, message sent to hospitalist, see new order. The care plan and education has been reviewed and mutually agreed upon with the patient, call light within reach.   
Pharmacy Home Medication Reconciliation Note    A medication reconciliation has been completed for Raffi Lux 2005    Pharmacy: [facility] [id number] [place], [place]  Information provided by: patient    The patient's home medication list is as follows:  No current facility-administered medications on file prior to encounter.     Current Outpatient Medications on File Prior to Encounter   Medication Sig Dispense Refill    hydrocortisone 2.5 % cream Apply topically 2 times daily. 453.6 g 1         Timing of last doses updated.    Thank you,  Jessi Neves CPhT      
Pt up as tolerated, pt ambulated to the bathroom, Prn pain med was given d/t pain in hips, refused lovenox, vitals are stable, call light within reach.   
Shift assessment completed. Alert and oriented X 4. Vital signs within normal. Ongoing assessment with no changes at this time. Call light within reach  
  03/26/25 117/69   03/11/25 128/84   11/20/24 106/60     \  Chest- clear  Heart-regular  Abd-soft  Ext- no edema    Labs  Hemoglobin   Date Value Ref Range Status   03/26/2025 14.2 13.5 - 17.5 g/dL Final     Hematocrit   Date Value Ref Range Status   03/26/2025 41.5 40.5 - 52.5 % Final     WBC   Date Value Ref Range Status   03/26/2025 5.5 4.0 - 11.0 K/uL Final     Platelets   Date Value Ref Range Status   03/26/2025 178 135 - 450 K/uL Final     Lab Results   Component Value Date    CREATININE 0.9 03/26/2025    BUN 5 (L) 03/26/2025     03/26/2025    K 4.2 03/26/2025     03/26/2025    CO2 26 03/26/2025     CK needs to be diluted today, called lab    Assessment/Plan:  1.  Rhabdomyolysis-would need aggressive IV fluid administration.  Follow CK closely.   Renal function currently WNL.  Will use normal saline at 150mL/h for today.  Discussed with lab to quantify CK.  Second episode.  Will pursue workup.  May need Rheumatology and Neurology input later.  2.  BP acceptable    Aster Galvez MD    
sounds  Musculoskeletal: No cyanosis in digits, neck supple  Neurology: CN 2-12 grossly intact. No speech or motor deficits  Psych: Normal affect. Alert and oriented in time, place and person  Skin: Warm, dry, normal turgor, no skin lesions noted     Labs and Tests:  CBC:   Recent Labs     03/25/25  0518 03/26/25  0508 03/27/25  0523   WBC 6.2 5.5 5.2   HGB 13.5 14.2 14.0    178 177     BMP:    Recent Labs     03/25/25  0518 03/26/25  0508 03/27/25  0523    141 139   K 4.3 4.2 4.2    108 105   CO2 23 26 27   BUN 6* 5* 6*   CREATININE 0.9 0.9 0.8*   GLUCOSE 112* 105* 88     Hepatic:   Recent Labs     03/24/25  1357   *   ALT 34   BILITOT 0.6   ALKPHOS 75     CK: 12,797 - 28,680 - 39,524 - 37, 660       Problem List  Principal Problem:    Rhabdomyolysis  Resolved Problems:    * No resolved hospital problems. *       Assessment & Plan:   Rhabdomyolysis:  CK now down trending.  Nephrology is following.  We discussed the plan of care in detail with Dr Galvez.  Will  continue with aggressive IV and oral hydration .  Would likely benefit from outpatient work up at  neurology   Oral hydration encouraged.   Ambulation in the halls/ room encouraged.  May shower       Diet: ADULT DIET; Regular  Code:Full Code  DVT PPX GETACHEW Shi - CNP   3/27/2025 6:57 AM

## 2025-03-30 NOTE — DISCHARGE SUMMARY
[facility]    Patient Demographics    Patient. Raffi Lux  Date of Birth. 2005  MRN. [id number]     Primary care provider. Se Herr MD  (Tel: [phone])    Admit date: 3/24/2025    Discharge date 3/30/2025  Note Date: 3/30/2025     Reason for Hospitalization.   Chief Complaint   Patient presents with    OTHER     States possible rhabdo, had it back in October, c/o body and muscle pain.          Significant Findings.   Principal Problem:    Rhabdomyolysis  Resolved Problems:    * No resolved hospital problems. *       Problems and results from this hospitalization that need follow up.  Recurrent Rhabdomyolysis:  needs outpatient work up with neurology and rheumatology     Significant test results and incidental findings.  CK: 12,797 - 28,680 - 39,524 - 37,660 - 20,482 -   10, 314 - 4,543 on day of d/c home     Invasive procedures and treatments.   None     Problem-based Hospital Course.  The patient is [employer] and did a 6 mile hike with a 35 lb back pain over the course of approximately 2 hours.  He later developed bilateral shoulder pain and weakness.  He was admitted with Rhabdomyolysis with initial CK of 12K .    He was followed by nephrology and was aggressively hydrated.  CK eventually trending down.  He felt better within 24 hours of admission.      Nephrology recommended an outpatient work up for  with neurology since this is his second bout with rhabdomyolysis.  ( First incidence followed a flu shot)     Renal function and BP were stable during his stay     He was eager to be released   Consults.  IP CONSULT TO HOSPITALIST  IP CONSULT TO NEPHROLOGY    Physical examination on discharge day.   /79   Pulse 54   Temp 98.1 °F (36.7 °C) (Oral)   Resp 16   Wt 65.4 kg (144 lb 3.2 oz)   SpO2 96%   BMI 21.24 kg/m²   General appearance.  Alert. Looks

## 2025-04-01 ENCOUNTER — TELEPHONE (OUTPATIENT)
Age: 20
End: 2025-04-01

## 2025-04-01 NOTE — TELEPHONE ENCOUNTER
Care Transitions Initial Follow Up Call    Outreach made within 2 business days of discharge: Yes    Patient: Raffi Lux Patient : 2005   MRN: [id number]  Reason for Admission: F  Discharge Date: 3/30/25       Spoke with: patient    Discharge department/facility: [facility] Interactive Patient Contact:  Was patient able to fill all prescriptions: No prescriptions given  Was patient instructed to bring all medications to the follow-up visit: No prescriptions given  Is patient taking all medications as directed in the discharge summary? No prescriptions given  Does patient understand their discharge instructions: Yes  Does patient have questions or concerns that need addressed prior to 7-14 day follow up office visit: No    Additional needs identified to be addressed with provider               Scheduled appointment with PCP within 7-14 days    Follow Up  Future Appointments   Date Time Provider Department Center   2025  3:40 PM Ron Xiao PT [facility]   2025  2:20 PM Ron Xiao PT [facility]   2025  2:40 PM Se Herr MD [facility]       Ginnette Reyes, MA

## 2025-04-02 ENCOUNTER — PATIENT MESSAGE (OUTPATIENT)
Age: 20
End: 2025-04-02

## 2025-04-02 ENCOUNTER — HOSPITAL ENCOUNTER (OUTPATIENT)
Dept: PHYSICAL THERAPY | Age: 20
Setting detail: THERAPIES SERIES
Discharge: HOME | End: 2025-04-02
Payer: COMMERCIAL

## 2025-04-02 PROCEDURE — 97530 THERAPEUTIC ACTIVITIES: CPT

## 2025-04-02 PROCEDURE — 97110 THERAPEUTIC EXERCISES: CPT

## 2025-04-02 NOTE — PLAN OF CARE
Plan just implemented, too soon (<30days) to assess goals progression   [] Goals require adjustment due to lack of progress  [] Patient is not progressing as expected and requires additional follow up with physician  [] Other:     TREATMENT PLAN     Frequency/Duration: 2x/week for 8 weeks for the following treatment interventions:    Interventions:  Therapeutic Exercise (54853) including: strength training, ROM, and functional mobility  Therapeutic Activities (21170) including: functional mobility training and education.  Neuromuscular Re-education (71764) activation and proprioception, including postural re-education.    Manual Therapy (40915) as indicated to include: Gr I-IV mobilizations, Soft Tissue Mobilization, and Dry Needling/IASTM  Patient education on activity modification and progression of HEP    Plan: slow progression of AROM and endurance/strength interventions while closely monitoring fatigue levels/pain/DOMS     Electronically Signed by Ron Xiao PT, DPT  Date: 04/02/2025     Note: Portions of this note have been templated and/or copied from initial evaluation, reassessments and prior notes for documentation efficiency.    Note: If patient does not return for scheduled/recommended follow up visits, this note will serve as a discharge from care along with the most recent update on progress.

## 2025-04-04 ENCOUNTER — HOSPITAL ENCOUNTER (OUTPATIENT)
Dept: PHYSICAL THERAPY | Age: 20
Setting detail: THERAPIES SERIES
Discharge: HOME | End: 2025-04-04
Payer: COMMERCIAL

## 2025-04-04 PROCEDURE — 97110 THERAPEUTIC EXERCISES: CPT

## 2025-04-04 NOTE — FLOWSHEET NOTE
in the arms. Second time getting rhabdo, with the first round occurring after a flu shot. Follows-up with PCP beginning of May, and with neurology at [facility] in August. Reports he is worried he may have myositis as he will be discharged from the . States he was given a lot of fluids in the hospital and he is feeling okay today. Just fatigued and weak.     IE: Pt presents this date with a long history of L shoulder pain. Worked at [employer] in 2021 before basic training. Was carrying a large trash bag and went to sling it over his shoulder when he got a sharp pain from his shoulder up into his neck. Had a lot of difficulty using his L shoulder following. Did a couple short bouts of PT which helped, but once he stopped after ~1-week pain quickly returned. Most recent bout of PT was in 2021. Has not received imaging. Anxious to get this taken care of as he is changing unit in the  in 2 years and wants to get back to regular, daily physical activity.      Test used Initial score  3/19/25 04/04/2025   Pain Summary VAS 1/10 at best  10/10 at worst 1-2/10 B    Functional questionnaire Upper Extremity functional Scale 78, 2.5% limitation 80, 0% limitation   Other:              Pain:  Pain location: L shoulder  Patient describes pain to be constant, intermittent, Sharp, aching, burning, and tingling  Pain decreases with: KT tape, rest  Pain increases with: Sitting >30 min, wearing a backpack, heavy gripping, reaching      Living status: lives with family    Occupation/School:  Work/School Status: Full time  Job Duties/Demands: lot tech at [employer]     Hand Dominance: Left    Sport/ Recreation/ Leisure/ Hobbies:   PLOF  Martial Arts - 4x per week   Gym - 6x per week     Review Of Systems (ROS):  [x] Performed Review of systems (Integumentary, CardioPulmonary, Neurological) by intake and observation. Intake form is in the medical record. Patient has been instructed to contact their primary care physician regarding

## 2025-04-07 ENCOUNTER — HOSPITAL ENCOUNTER (OUTPATIENT)
Dept: PHYSICAL THERAPY | Age: 20
Setting detail: THERAPIES SERIES
Discharge: HOME | End: 2025-04-07
Payer: COMMERCIAL

## 2025-04-07 PROCEDURE — 97110 THERAPEUTIC EXERCISES: CPT

## 2025-04-07 NOTE — FLOWSHEET NOTE
[facility] - Outpatient Rehabilitation and Therapy: [address], [place] [address] office: [phone] fax: [phone]    Physical Therapy: TREATMENT/PROGRESS NOTE   Patient: Raffi Lux (20 y.o. male)   Examination Date: 2025   :  2005 MRN: [id number]   Visit #: Visit count could not be calculated. Make sure you are using a visit which is associated with an episode.   Insurance Allowable Auth Needed   30pcy []Yes    [x]No    Insurance: Payor: [employer] / Plan: [employer] [place] MEDICAID / Product Type: *No Product type* /   Insurance ID: [id number] - (Medicaid Managed)  Secondary Insurance (if applicable):    Treatment Diagnosis:     ICD-10-CM    1. Weakness  R53.1       2. Decreased activity tolerance  R68.89          Medical Diagnosis:  Chronic left shoulder pain [M25.512, G89.29]   Referring Physician: Se Herr MD  PCP: Se Herr MD     Plan of care signed (Y/N):     Date of Patient follow up with Physician:      Plan of Care Report: NO  POC update due: (10 visits /OR AUTH LIMITS, whichever is less)  2025                                             Medical History:  Comorbidities:  None  Relevant Medical History:                                          Precautions/ Contra-indications:           Latex allergy:  NO  Pacemaker:    NO  Contraindications for Manipulation: NA  Date of Surgery:   Other:    Red Flags:  None    Suicide Screening:   The patient did not verbalize a primary behavioral concern, suicidal ideation, suicidal intent, or demonstrate suicidal behaviors.    Preferred Language for Healthcare:   [x] English       [] other:    SUBJECTIVE EXAMINATION     Patient stated complaint: Pt c/o moderate residual L SHLD soreness following last treatment. Pt c/o mild to moderate L SHLD stiffness currently.      Pt presents this date ~2-weeks since initial eval. Was admitted to the hospital  for rhabdomyolysis following a 6 mile ruck march

## 2025-04-09 ENCOUNTER — HOSPITAL ENCOUNTER (OUTPATIENT)
Dept: PHYSICAL THERAPY | Age: 20
Setting detail: THERAPIES SERIES
Discharge: HOME | End: 2025-04-09
Payer: COMMERCIAL

## 2025-04-09 PROCEDURE — 97140 MANUAL THERAPY 1/> REGIONS: CPT

## 2025-04-09 PROCEDURE — 97110 THERAPEUTIC EXERCISES: CPT

## 2025-04-09 NOTE — FLOWSHEET NOTE
Return to Play: NA    Prognosis for POC: [x] Good [] Fair  [] Poor    Patient requires continued skilled intervention: [x] Yes  [] No      CHARGE CAPTURE     PT CHARGE GRID   CPT Code (TIMED) minutes # CPT Code (UNTIMED) #     Therex (52893)  30 2  EVAL:LOW (68515 - Typically 20 minutes face-to-face)     Neuromusc. Re-ed (74528)    Re-Eval (10581)     Manual (49149) 8 1  Estim Unattended (12358)     Ther. Act (72176)    Mech. Traction (50828)     Gait (39957)    Dry Needle 1-2 muscle (46730)     Aquatic Therex (11862)    Dry Needle 3+ muscle (36600)     Iontophoresis (93646)    VASO (10814)     Ultrasound (87250)    Group Therapy (49891)     Estim Attended (95320)    Canalith Repositioning (05095)     Physical Performance Test (58846)    Custom orthotic ()     Other:    Other:    Total Timed Code Tx Minutes 38 3       Total Treatment Minutes 40      Charge Justification:  (71277) THERAPEUTIC EXERCISE - Provided verbal/tactile cueing for HEP and/or activities related to strengthening, flexibility, endurance, ROM performed to prevent loss of range of motion, maintain or improve muscular strength or increase flexibility, following either an injury or surgery.   (44863) MANUAL THERAPY -  Manual therapy techniques, 1 or more regions, each 15 minutes (Mobilization/manipulation, manual lymphatic drainage, manual traction) for the purpose of modulating pain, promoting relaxation,  increasing ROM, reducing/eliminating soft tissue swelling/inflammation/restriction, improving soft tissue extensibility and allowing for proper ROM for normal function with self care, mobility, lifting and ambulation    GOALS     Patient stated goal: \"eliminate pain\"  [] Progressing: [] Met: [] Not Met: [] Adjusted    Therapist goals for Patient:   Short Term Goals: To be achieved in: 2 weeks  1Independent in HEP and progression per patient tolerance, in order to prevent re-injury.   [] Progressing: [] Met: [] Not Met: []

## 2025-04-16 ENCOUNTER — HOSPITAL ENCOUNTER (OUTPATIENT)
Dept: PHYSICAL THERAPY | Age: 20
Setting detail: THERAPIES SERIES
Discharge: HOME | End: 2025-04-16
Payer: COMMERCIAL

## 2025-04-16 PROCEDURE — 97110 THERAPEUTIC EXERCISES: CPT

## 2025-04-16 PROCEDURE — 97112 NEUROMUSCULAR REEDUCATION: CPT

## 2025-04-16 NOTE — FLOWSHEET NOTE
[facility] - Outpatient Rehabilitation and Therapy: [address], [place] [address] office: [phone] fax: [phone]    Physical Therapy: TREATMENT/PROGRESS NOTE   Patient: Raffi Lux (20 y.o. male)   Examination Date: 2025   :  2005 MRN: [id number]   Visit #: 7   Insurance Allowable Auth Needed   30pcy []Yes    [x]No    Insurance: Payor: [employer] / Plan: [employer] [place] MEDICAID / Product Type: *No Product type* /   Insurance ID: [id number] - (Medicaid Managed)  Secondary Insurance (if applicable):    Treatment Diagnosis:     ICD-10-CM    1. Weakness  R53.1       2. Decreased activity tolerance  R68.89          Medical Diagnosis:  Chronic left shoulder pain [M25.512, G89.29]   Referring Physician: Se Herr MD  PCP: Se Herr MD     Plan of care signed (Y/N):     Date of Patient follow up with Physician:      Plan of Care Report: NO  POC update due: (10 visits /OR AUTH LIMITS, whichever is less)  2025                                             Medical History:  Comorbidities:  None  Relevant Medical History:                                          Precautions/ Contra-indications:           Latex allergy:  NO  Pacemaker:    NO  Contraindications for Manipulation: NA  Date of Surgery:   Other:    Red Flags:  None    Suicide Screening:   The patient did not verbalize a primary behavioral concern, suicidal ideation, suicidal intent, or demonstrate suicidal behaviors.    Preferred Language for Healthcare:   [x] English       [] other:    SUBJECTIVE EXAMINATION     Patient stated complaint: Pt reports drill this past weekend went well. Rucked 2 miles with 70# on his back. Both elbows are sore and contributes this to workouts/training he did at drill. Was surprised he did not get rhabdo has this drill was more intense than the one prior.  [facility]. Drill again in  and will be gone for 8-days. Left

## 2025-04-18 ENCOUNTER — HOSPITAL ENCOUNTER (OUTPATIENT)
Dept: PHYSICAL THERAPY | Age: 20
Setting detail: THERAPIES SERIES
Discharge: HOME | End: 2025-04-18
Payer: COMMERCIAL

## 2025-04-18 PROCEDURE — 97110 THERAPEUTIC EXERCISES: CPT

## 2025-04-18 PROCEDURE — 97140 MANUAL THERAPY 1/> REGIONS: CPT

## 2025-04-18 NOTE — FLOWSHEET NOTE
[facility] - Outpatient Rehabilitation and Therapy: [address], [address], [place] [address] office: [phone] fax: [phone]    Physical Therapy: TREATMENT/PROGRESS NOTE   Patient: Raffi Lux (20 y.o. male)   Examination Date: 2025   :  2005 MRN: [id number]   Visit #: 8   Insurance Allowable Auth Needed   30pcy []Yes    [x]No    Insurance: Payor: [employer] / Plan: [employer] [place] MEDICAID / Product Type: *No Product type* /   Insurance ID: [id number] - (Medicaid Managed)  Secondary Insurance (if applicable):    Treatment Diagnosis:     ICD-10-CM    1. Weakness  R53.1       2. Decreased activity tolerance  R68.89          Medical Diagnosis:  Chronic left shoulder pain [M25.512, G89.29]   Referring Physician: Se Herr MD  PCP: Se Herr MD     Plan of care signed (Y/N):     Date of Patient follow up with Physician:      Plan of Care Report: NO  POC update due: (10 visits /OR AUTH LIMITS, whichever is less)  2025                                             Medical History:  Comorbidities:  None  Relevant Medical History:                                          Precautions/ Contra-indications:           Latex allergy:  NO  Pacemaker:    NO  Contraindications for Manipulation: NA  Date of Surgery:   Other:    Red Flags:  None    Suicide Screening:   The patient did not verbalize a primary behavioral concern, suicidal ideation, suicidal intent, or demonstrate suicidal behaviors.    Preferred Language for Healthcare:   [x] English       [] other:    SUBJECTIVE EXAMINATION     Patient stated complaint: Pt rpeorts mild L UE/richard-scapular tightness and discomfort.       IE: Pt presents this date with a long history of L shoulder pain. Worked at "[employer] in  before basic training. Was carrying a large trash bag and went to sling it over his shoulder when he got a sharp pain from his shoulder up into his neck. Had a lot of difficulty using his L shoulder

## 2025-04-23 ENCOUNTER — HOSPITAL ENCOUNTER (OUTPATIENT)
Dept: PHYSICAL THERAPY | Age: 20
Setting detail: THERAPIES SERIES
Discharge: HOME | End: 2025-04-23
Payer: COMMERCIAL

## 2025-04-23 PROCEDURE — 97112 NEUROMUSCULAR REEDUCATION: CPT

## 2025-04-23 NOTE — FLOWSHEET NOTE
his L shoulder following. Did a couple short bouts of PT which helped, but once he stopped after ~1-week pain quickly returned. Most recent bout of PT was in 2021. Has not received imaging. Anxious to get this taken care of as he is changing unit in the  in 2 years and wants to get back to regular, daily physical activity.      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 Intake form is in the medical record. Patient has been instructed to contact their primary care physician regarding ROS issues if not already being addressed at this time.    [x] Patient history, allergies, meds reviewed. Medical chart reviewed. See intake form.     OBJECTIVE EXAMINATION   4/23 - 10 min late    4/16 - good form/muscle activation with prone scap series following initial cueing     4/9 - inc muscle tone/TTP - LUT/levator scap    4/4 - 160 deg active sh flex- B     4/2/25  ROM/Strength: (Blank cells denote NT)    Mvmt (norm) AROM L AROM R Notes PROM L PROM R Notes       SHOULDER Flexion (180) 146 157        Abduction (180) 121 155        ER -0          ER -90 (90) 96 98        IR -0          IR -90 ([phone] 67 65         ELBOW

## 2025-04-25 ENCOUNTER — HOSPITAL ENCOUNTER (OUTPATIENT)
Dept: PHYSICAL THERAPY | Age: 20
Setting detail: THERAPIES SERIES
Discharge: HOME | End: 2025-04-25
Payer: COMMERCIAL

## 2025-04-25 PROCEDURE — 97112 NEUROMUSCULAR REEDUCATION: CPT

## 2025-04-25 PROCEDURE — 97110 THERAPEUTIC EXERCISES: CPT

## 2025-04-25 NOTE — FLOWSHEET NOTE
[facility] - Outpatient Rehabilitation and Therapy: [address], [place] [address] office: [phone] fax: [phone]    Physical Therapy: TREATMENT/PROGRESS NOTE   Patient: Raffi Lux (20 y.o. male)   Examination Date: 2025   :  2005 MRN: [id number]   Visit #: 10   Insurance Allowable Auth Needed   30pcy []Yes    [x]No    Insurance: Payor: [employer] / Plan: [employer] [place] MEDICAID / Product Type: *No Product type* /   Insurance ID: [id number] - (Medicaid Managed)  Secondary Insurance (if applicable):    Treatment Diagnosis:     ICD-10-CM    1. Weakness  R53.1       2. Decreased activity tolerance  R68.89          Medical Diagnosis:  Chronic left shoulder pain [M25.512, G89.29]   Referring Physician: Se Herr MD  PCP: Se Herr MD     Plan of care signed (Y/N):     Date of Patient follow up with Physician:      Plan of Care Report: NO  POC update due: (10 visits /OR AUTH LIMITS, whichever is less)  2025                                             Medical History:  Comorbidities:  None  Relevant Medical History:                                          Precautions/ Contra-indications:           Latex allergy:  NO  Pacemaker:    NO  Contraindications for Manipulation: NA  Date of Surgery:   Other:    Red Flags:  None    Suicide Screening:   The patient did not verbalize a primary behavioral concern, suicidal ideation, suicidal intent, or demonstrate suicidal behaviors.    Preferred Language for Healthcare:   [x] English       [] other:    SUBJECTIVE EXAMINATION     Patient stated complaint: Pt reports some pain in L UT this morning.     IE: Pt presents this date with a long history of L shoulder pain. Worked at "[employer] in  before basic training. Was carrying a large trash bag and went to sling it over his shoulder when he got a sharp pain from his shoulder up into his neck. Had a lot of difficulty using his L shoulder following. Did a couple

## 2025-04-30 ENCOUNTER — HOSPITAL ENCOUNTER (OUTPATIENT)
Dept: PHYSICAL THERAPY | Age: 20
Setting detail: THERAPIES SERIES
Discharge: HOME | End: 2025-04-30
Payer: COMMERCIAL

## 2025-04-30 PROCEDURE — 97112 NEUROMUSCULAR REEDUCATION: CPT

## 2025-04-30 PROCEDURE — 97110 THERAPEUTIC EXERCISES: CPT

## 2025-04-30 NOTE — FLOWSHEET NOTE
progression.    GOALS     Patient stated goal: \"eliminate pain\"  [] Progressing: [] Met: [] Not Met: [] Adjusted    Therapist goals for Patient:   Short Term Goals: To be achieved in: 2 weeks  1Independent in HEP and progression per patient tolerance, in order to prevent re-injury.   [] Progressing: [] Met: [] Not Met: [] Adjusted  Patient will have a decrease in pain to <5/10 at worst to facilitate improvement in movement, function, and ADLs as indicated by Functional Deficits.  [] Progressing: [] Met: [] Not Met: [] Adjusted    Long Term Goals: To be achieved in: 6 weeks  Disability index score of 0% or less for the Upper Extremity functional Scale to assist with reaching prior level of function with activities such as sitting >30 min and reaching.  [] Progressing: [] Met: [] Not Met: [] Adjusted  Patient will demonstrate increased bilateral external rotation strength at 90 to at least 5/5 to allow for proper functional mobility to enable patient to return to lifting heavy objects overhead.   [] Progressing: [] Met: [] Not Met: [] Adjusted  Patient will return to individual health and wellness regimen 6x per week without increased symptoms or restriction.   [] Progressing: [] Met: [] Not Met: [] Adjusted  Pt will achieve 21 taps during the UE CKC test demonstrating adequate strength and stability to support martial arts.   [] Progressing: [] Met: [] Not Met: [] Adjusted     Overall Progression Towards Functional goals/ Treatment Progress Update:  [] Patient is progressing as expected towards functional goals listed.    [] Progression is slowed due to complexities/Impairments listed.  [] Progression has been slowed due to co-morbidities.  [x] Plan just implemented, too soon (<30days) to assess goals progression   [] Goals require adjustment due to lack of progress  [] Patient is not progressing as expected and requires additional follow up with physician  [] Other:     TREATMENT PLAN     Frequency/Duration: 2x/week

## 2025-05-02 ENCOUNTER — APPOINTMENT (OUTPATIENT)
Dept: PHYSICAL THERAPY | Age: 20
End: 2025-05-02
Payer: COMMERCIAL

## 2025-05-05 ENCOUNTER — HOSPITAL ENCOUNTER (OUTPATIENT)
Dept: PHYSICAL THERAPY | Age: 20
Setting detail: THERAPIES SERIES
Discharge: HOME | End: 2025-05-05
Payer: COMMERCIAL

## 2025-05-05 PROCEDURE — 97110 THERAPEUTIC EXERCISES: CPT

## 2025-05-05 PROCEDURE — 97140 MANUAL THERAPY 1/> REGIONS: CPT

## 2025-05-05 PROCEDURE — 97112 NEUROMUSCULAR REEDUCATION: CPT

## 2025-05-05 NOTE — FLOWSHEET NOTE
for medical necessity for care and/or justification of therapy services:  The patient has functional impairments and/or activity limitations and would benefit from continued outpatient therapy services to address the deficits outlined in the patients goals  The patient has a musculoskeletal condition(s) with a corresponding ICD-10 code that is of complexity and severity that require skilled therapeutic intervention. This has a direct and significant impact on the need for therapy and significantly impacts the rate of recovery.   The patient has a complexity identified by an ICD-10 code that has a direct and significant impact on the need for therapy.  (Significantly impacts the rate of recovery and is associated with a primary condition.)     Return to Play: NA    Prognosis for POC: [x] Good [] Fair  [] Poor    Patient requires continued skilled intervention: [x] Yes  [] No      CHARGE CAPTURE     PT CHARGE GRID   CPT Code (TIMED) minutes # CPT Code (UNTIMED) #     Therex (94613)  15 1  EVAL:LOW (06532 - Typically 20 minutes face-to-face)     Neuromusc. Re-ed (23608) 20 1  Re-Eval (45028)     Manual (96988) 8 1  Estim Unattended (11318)     Ther. Act (61830)    Mech. Traction (46013)     Gait (52421)    Dry Needle 1-2 muscle (46188)     Aquatic Therex (17106)    Dry Needle 3+ muscle (09642)     Iontophoresis (80648)    VASO (84612)     Ultrasound (05365)    Group Therapy (61154)     Estim Attended (42025)    Canalith Repositioning (78751)     Physical Performance Test (50984)    Custom orthotic ()     Other:    Other:    Total Timed Code Tx Minutes 43 3       Total Treatment Minutes 43      Charge Justification:  (79963) THERAPEUTIC EXERCISE - Provided verbal/tactile cueing for HEP and/or activities related to strengthening, flexibility, endurance, ROM performed to prevent loss of range of motion, maintain or improve muscular strength or increase flexibility, following either an injury or surgery.   (86925)

## 2025-05-13 ENCOUNTER — HOSPITAL ENCOUNTER (OUTPATIENT)
Dept: PHYSICAL THERAPY | Age: 20
Setting detail: THERAPIES SERIES
Discharge: HOME | End: 2025-05-13
Payer: COMMERCIAL

## 2025-05-13 PROCEDURE — 97112 NEUROMUSCULAR REEDUCATION: CPT

## 2025-05-13 PROCEDURE — 97140 MANUAL THERAPY 1/> REGIONS: CPT

## 2025-05-13 PROCEDURE — 97110 THERAPEUTIC EXERCISES: CPT

## 2025-05-13 NOTE — FLOWSHEET NOTE
Met: [] Adjusted     Overall Progression Towards Functional goals/ Treatment Progress Update:  [] Patient is progressing as expected towards functional goals listed.    [] Progression is slowed due to complexities/Impairments listed.  [] Progression has been slowed due to co-morbidities.  [x] Plan just implemented, too soon (<30days) to assess goals progression   [] Goals require adjustment due to lack of progress  [] Patient is not progressing as expected and requires additional follow up with physician  [] Other:     TREATMENT PLAN     Frequency/Duration: 2x/week for 8 weeks for the following treatment interventions:    Interventions:  Therapeutic Exercise (89380) including: strength training, ROM, and functional mobility  Therapeutic Activities (11277) including: functional mobility training and education.  Neuromuscular Re-education (80073) activation and proprioception, including postural re-education.    Manual Therapy (50507) as indicated to include: Gr I-IV mobilizations, Soft Tissue Mobilization, and Dry Needling/IASTM  Patient education on activity modification and progression of HEP    Plan: slow progression of AROM and endurance/strength interventions while closely monitoring fatigue levels/pain/DOMS, RTC/richard-scap strength and neuromuscular control    Electronically Signed by Venu Mills PTA, ATC  Date: 05/13/2025     Note: Portions of this note have been templated and/or copied from initial evaluation, reassessments and prior notes for documentation efficiency.    Note: If patient does not return for scheduled/recommended follow up visits, this note will serve as a discharge from care along with the most recent update on progress.

## 2025-05-15 ENCOUNTER — HOSPITAL ENCOUNTER (OUTPATIENT)
Dept: PHYSICAL THERAPY | Age: 20
Setting detail: THERAPIES SERIES
Discharge: HOME | End: 2025-05-15
Payer: COMMERCIAL

## 2025-05-15 PROCEDURE — 97112 NEUROMUSCULAR REEDUCATION: CPT

## 2025-05-15 PROCEDURE — 97110 THERAPEUTIC EXERCISES: CPT

## 2025-05-15 NOTE — FLOWSHEET NOTE
Did a couple short bouts of PT which helped, but once he stopped after ~1-week pain quickly returned. Most recent bout of PT was in 2021. Has not received imaging. Anxious to get this taken care of as he is changing unit in the  in 2 years and wants to get back to regular, daily physical activity.      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 Intake form is in the medical record. Patient has been instructed to contact their primary care physician regarding ROS issues if not already being addressed at this time.    [x] Patient history, allergies, meds reviewed. Medical chart reviewed. See intake form.     OBJECTIVE EXAMINATION     5/15: UE scan denotes full fluid AROM of B SHLD's in all planes of motion.   5/5: normal general mechanics with PRE's  4/23 - 10 min late    4/16 - good form/muscle activation with prone scap series following initial cueing     4/9 - inc muscle tone/TTP - LUT/levator scap    4/4 - 160 deg active sh flex- B     4/2/25  ROM/Strength: (Blank cells denote NT)    Mvmt (norm) AROM L AROM R Notes PROM L PROM R Notes       SHOULDER Flexion (180) 146 157        Abduction (180) 121 155

## 2025-05-22 ENCOUNTER — HOSPITAL ENCOUNTER (OUTPATIENT)
Dept: PHYSICAL THERAPY | Age: 20
Setting detail: THERAPIES SERIES
End: 2025-05-22
Payer: COMMERCIAL

## 2025-05-29 ENCOUNTER — OFFICE VISIT (OUTPATIENT)
Age: 20
End: 2025-05-29
Payer: COMMERCIAL

## 2025-05-29 VITALS
DIASTOLIC BLOOD PRESSURE: 62 MMHG | WEIGHT: 149.1 LBS | HEART RATE: 69 BPM | SYSTOLIC BLOOD PRESSURE: 100 MMHG | OXYGEN SATURATION: 99 % | BODY MASS INDEX: 21.96 KG/M2

## 2025-05-29 DIAGNOSIS — F33.42 RECURRENT MAJOR DEPRESSIVE DISORDER, IN FULL REMISSION: ICD-10-CM

## 2025-05-29 DIAGNOSIS — M62.82 NON-TRAUMATIC RHABDOMYOLYSIS: Primary | ICD-10-CM

## 2025-05-29 PROCEDURE — G8427 DOCREV CUR MEDS BY ELIG CLIN: HCPCS | Performed by: INTERNAL MEDICINE

## 2025-05-29 PROCEDURE — 99213 OFFICE O/P EST LOW 20 MIN: CPT | Performed by: INTERNAL MEDICINE

## 2025-05-29 PROCEDURE — G8420 CALC BMI NORM PARAMETERS: HCPCS | Performed by: INTERNAL MEDICINE

## 2025-05-29 PROCEDURE — 1036F TOBACCO NON-USER: CPT | Performed by: INTERNAL MEDICINE

## 2025-05-29 ASSESSMENT — ENCOUNTER SYMPTOMS
ABDOMINAL PAIN: 0
WHEEZING: 0
EYE REDNESS: 0
BLOOD IN STOOL: 0
SHORTNESS OF BREATH: 0
SINUS PRESSURE: 0
EYE PAIN: 0

## 2025-05-29 NOTE — ASSESSMENT & PLAN NOTE
Now with a second episode requiring hospitalization, he is following with neurology and undergoing genetic testing.

## 2025-05-29 NOTE — PROGRESS NOTES
Sig Dispense Refill    hydrocortisone 2.5 % cream Apply topically 2 times daily. 453.6 g 1     No current facility-administered medications for this visit.       Allergies   Allergen Reactions    Peanut-Containing Drug Products Itching    Environmental/Seasonal         Review of Systems   Constitutional:  Negative for chills and fever.   HENT:  Negative for congestion and sinus pressure.    Eyes:  Negative for pain and redness.   Respiratory:  Negative for shortness of breath and wheezing.    Gastrointestinal:  Negative for abdominal pain and blood in stool.   Skin:  Negative for rash.   Neurological:  Negative for weakness and numbness.   Psychiatric/Behavioral:  Negative for dysphoric mood and suicidal ideas.        /62   Pulse 69   Wt 67.6 kg (149 lb 1.6 oz)   SpO2 99%   BMI 21.96 kg/m²    Physical Exam  Vitals reviewed.   Constitutional:       General: He is not in acute distress.  HENT:      Head: Normocephalic and atraumatic.      Mouth/Throat:      Mouth: Mucous membranes are moist.      Pharynx: No oropharyngeal exudate.   Eyes:      Extraocular Movements: Extraocular movements intact.      Pupils: Pupils are equal, round, and reactive to light.   Cardiovascular:      Rate and Rhythm: Normal rate and regular rhythm.   Pulmonary:      Breath sounds: Normal breath sounds. No wheezing, rhonchi or rales.   Abdominal:      General: There is no distension.      Palpations: Abdomen is soft.      Tenderness: There is no abdominal tenderness.   Musculoskeletal:      Cervical back: No rigidity.      Right lower leg: No edema.      Left lower leg: No edema.   Lymphadenopathy:      Cervical: No cervical adenopathy.   Skin:     General: Skin is warm and dry.      Findings: No rash.   Neurological:      General: No focal deficit present.      Mental Status: He is alert and oriented to person, place, and time.   Psychiatric:         Mood and Affect: Mood normal.         Behavior: Behavior normal.           An